# Patient Record
Sex: FEMALE | Race: WHITE | Employment: FULL TIME | URBAN - NONMETROPOLITAN AREA
[De-identification: names, ages, dates, MRNs, and addresses within clinical notes are randomized per-mention and may not be internally consistent; named-entity substitution may affect disease eponyms.]

---

## 2017-01-03 RX ORDER — SIMVASTATIN 40 MG
40 TABLET ORAL EVERY EVENING
Qty: 30 TABLET | Refills: 3 | Status: SHIPPED | OUTPATIENT
Start: 2017-01-03 | End: 2017-04-25 | Stop reason: SDUPTHER

## 2017-01-20 DIAGNOSIS — E11.9 TYPE 2 DIABETES MELLITUS WITHOUT COMPLICATION, UNSPECIFIED LONG TERM INSULIN USE STATUS: ICD-10-CM

## 2017-04-25 ENCOUNTER — OFFICE VISIT (OUTPATIENT)
Dept: FAMILY MEDICINE CLINIC | Age: 57
End: 2017-04-25

## 2017-04-25 VITALS
HEIGHT: 63 IN | HEART RATE: 91 BPM | BODY MASS INDEX: 47.06 KG/M2 | OXYGEN SATURATION: 98 % | WEIGHT: 265.6 LBS | SYSTOLIC BLOOD PRESSURE: 118 MMHG | DIASTOLIC BLOOD PRESSURE: 84 MMHG

## 2017-04-25 DIAGNOSIS — I10 ESSENTIAL HYPERTENSION: ICD-10-CM

## 2017-04-25 DIAGNOSIS — M54.41 ACUTE RIGHT-SIDED LOW BACK PAIN WITH RIGHT-SIDED SCIATICA: ICD-10-CM

## 2017-04-25 DIAGNOSIS — J30.89 ENVIRONMENTAL AND SEASONAL ALLERGIES: ICD-10-CM

## 2017-04-25 DIAGNOSIS — E11.9 TYPE 2 DIABETES MELLITUS WITHOUT COMPLICATION, WITHOUT LONG-TERM CURRENT USE OF INSULIN (HCC): Primary | ICD-10-CM

## 2017-04-25 DIAGNOSIS — E78.00 PURE HYPERCHOLESTEROLEMIA: ICD-10-CM

## 2017-04-25 LAB — HBA1C MFR BLD: 6.7 %

## 2017-04-25 PROCEDURE — 83036 HEMOGLOBIN GLYCOSYLATED A1C: CPT | Performed by: NURSE PRACTITIONER

## 2017-04-25 PROCEDURE — 99214 OFFICE O/P EST MOD 30 MIN: CPT | Performed by: NURSE PRACTITIONER

## 2017-04-25 RX ORDER — LOSARTAN POTASSIUM 25 MG/1
25 TABLET ORAL DAILY
Qty: 90 TABLET | Refills: 0 | Status: SHIPPED | OUTPATIENT
Start: 2017-04-25 | End: 2017-07-13 | Stop reason: SDUPTHER

## 2017-04-25 RX ORDER — MONTELUKAST SODIUM 10 MG/1
10 TABLET ORAL DAILY
Qty: 30 TABLET | Refills: 2 | Status: ON HOLD | OUTPATIENT
Start: 2017-04-25 | End: 2017-09-11 | Stop reason: HOSPADM

## 2017-04-25 RX ORDER — FLUTICASONE PROPIONATE 50 MCG
1 SPRAY, SUSPENSION (ML) NASAL 2 TIMES DAILY
Qty: 1 BOTTLE | Refills: 3 | Status: ON HOLD | OUTPATIENT
Start: 2017-04-25 | End: 2017-09-11 | Stop reason: HOSPADM

## 2017-04-25 RX ORDER — SIMVASTATIN 40 MG
40 TABLET ORAL EVERY EVENING
Qty: 90 TABLET | Refills: 3 | Status: SHIPPED | OUTPATIENT
Start: 2017-04-25 | End: 2018-05-23 | Stop reason: SDUPTHER

## 2017-04-25 RX ORDER — METHYLPREDNISOLONE 4 MG/1
TABLET ORAL
Qty: 1 KIT | Refills: 0 | Status: SHIPPED | OUTPATIENT
Start: 2017-04-25 | End: 2018-10-05 | Stop reason: SDUPTHER

## 2017-04-25 RX ORDER — CYCLOBENZAPRINE HCL 5 MG
5 TABLET ORAL EVERY 8 HOURS PRN
Qty: 30 TABLET | Refills: 3 | Status: SHIPPED | OUTPATIENT
Start: 2017-04-25 | End: 2017-05-05

## 2017-04-25 ASSESSMENT — ENCOUNTER SYMPTOMS
DIARRHEA: 0
NAUSEA: 0
WHEEZING: 0
CONSTIPATION: 0
BLURRED VISION: 0
VOMITING: 0
COUGH: 0

## 2017-04-25 ASSESSMENT — PATIENT HEALTH QUESTIONNAIRE - PHQ9
2. FEELING DOWN, DEPRESSED OR HOPELESS: 0
SUM OF ALL RESPONSES TO PHQ QUESTIONS 1-9: 0
SUM OF ALL RESPONSES TO PHQ9 QUESTIONS 1 & 2: 0
1. LITTLE INTEREST OR PLEASURE IN DOING THINGS: 0

## 2017-04-26 PROBLEM — J30.89 ENVIRONMENTAL AND SEASONAL ALLERGIES: Status: ACTIVE | Noted: 2017-04-26

## 2017-04-26 ASSESSMENT — ENCOUNTER SYMPTOMS: BACK PAIN: 1

## 2017-04-28 ENCOUNTER — NURSE ONLY (OUTPATIENT)
Dept: FAMILY MEDICINE CLINIC | Age: 57
End: 2017-04-28

## 2017-04-28 DIAGNOSIS — M54.41 ACUTE RIGHT-SIDED LOW BACK PAIN WITH RIGHT-SIDED SCIATICA: ICD-10-CM

## 2017-04-28 DIAGNOSIS — E11.9 TYPE 2 DIABETES MELLITUS WITHOUT COMPLICATION, WITHOUT LONG-TERM CURRENT USE OF INSULIN (HCC): ICD-10-CM

## 2017-04-28 DIAGNOSIS — E78.00 PURE HYPERCHOLESTEROLEMIA: ICD-10-CM

## 2017-04-28 LAB
A/G RATIO: 1.6 (ref 1.1–2.2)
ALBUMIN SERPL-MCNC: 4.2 G/DL (ref 3.4–5)
ALP BLD-CCNC: 93 U/L (ref 40–129)
ALT SERPL-CCNC: 14 U/L (ref 10–40)
ANION GAP SERPL CALCULATED.3IONS-SCNC: 15 MMOL/L (ref 3–16)
AST SERPL-CCNC: 20 U/L (ref 15–37)
BILIRUB SERPL-MCNC: 0.6 MG/DL (ref 0–1)
BUN BLDV-MCNC: 10 MG/DL (ref 7–20)
CALCIUM SERPL-MCNC: 9.9 MG/DL (ref 8.3–10.6)
CHLORIDE BLD-SCNC: 103 MMOL/L (ref 99–110)
CHOLESTEROL, TOTAL: 159 MG/DL (ref 0–199)
CO2: 27 MMOL/L (ref 21–32)
CREAT SERPL-MCNC: 0.8 MG/DL (ref 0.6–1.1)
GFR AFRICAN AMERICAN: >60
GFR NON-AFRICAN AMERICAN: >60
GLOBULIN: 2.6 G/DL
GLUCOSE BLD-MCNC: 119 MG/DL (ref 70–99)
HDLC SERPL-MCNC: 55 MG/DL (ref 40–60)
LDL CHOLESTEROL CALCULATED: 83 MG/DL
MAGNESIUM: 2.1 MG/DL (ref 1.8–2.4)
POTASSIUM SERPL-SCNC: 5.1 MMOL/L (ref 3.5–5.1)
SODIUM BLD-SCNC: 145 MMOL/L (ref 136–145)
TOTAL PROTEIN: 6.8 G/DL (ref 6.4–8.2)
TRIGL SERPL-MCNC: 105 MG/DL (ref 0–150)
VLDLC SERPL CALC-MCNC: 21 MG/DL

## 2017-04-28 PROCEDURE — 36415 COLL VENOUS BLD VENIPUNCTURE: CPT | Performed by: NURSE PRACTITIONER

## 2017-06-07 ENCOUNTER — OFFICE VISIT (OUTPATIENT)
Dept: FAMILY MEDICINE CLINIC | Age: 57
End: 2017-06-07

## 2017-06-07 VITALS
SYSTOLIC BLOOD PRESSURE: 134 MMHG | DIASTOLIC BLOOD PRESSURE: 78 MMHG | WEIGHT: 270 LBS | HEIGHT: 63 IN | BODY MASS INDEX: 47.84 KG/M2 | HEART RATE: 87 BPM | OXYGEN SATURATION: 98 %

## 2017-06-07 DIAGNOSIS — M54.41 ACUTE BILATERAL LOW BACK PAIN WITH RIGHT-SIDED SCIATICA: Primary | ICD-10-CM

## 2017-06-07 PROCEDURE — 99202 OFFICE O/P NEW SF 15 MIN: CPT | Performed by: NURSE PRACTITIONER

## 2017-06-07 RX ORDER — CYCLOBENZAPRINE HCL 5 MG
5 TABLET ORAL EVERY 8 HOURS PRN
Qty: 30 TABLET | Refills: 1 | Status: SHIPPED | OUTPATIENT
Start: 2017-06-07 | End: 2017-06-17

## 2017-06-07 RX ORDER — BENZONATATE 100 MG/1
CAPSULE ORAL
COMMUNITY
Start: 2017-05-16 | End: 2017-08-18 | Stop reason: ALTCHOICE

## 2017-06-07 RX ORDER — PREDNISONE 20 MG/1
40 TABLET ORAL DAILY
Qty: 10 TABLET | Refills: 0 | Status: SHIPPED | OUTPATIENT
Start: 2017-06-07 | End: 2017-06-12

## 2017-06-07 RX ORDER — LORATADINE 10 MG/1
10 TABLET ORAL DAILY
COMMUNITY
End: 2017-07-11 | Stop reason: SDUPTHER

## 2017-06-07 ASSESSMENT — ENCOUNTER SYMPTOMS
ALLERGIC/IMMUNOLOGIC NEGATIVE: 1
BACK PAIN: 1
GASTROINTESTINAL NEGATIVE: 1
EYES NEGATIVE: 1
RESPIRATORY NEGATIVE: 1

## 2017-06-08 ENCOUNTER — HOSPITAL ENCOUNTER (OUTPATIENT)
Dept: OTHER | Age: 57
Discharge: HOME | End: 2017-06-08
Attending: NURSE PRACTITIONER | Admitting: NURSE PRACTITIONER

## 2017-06-08 DIAGNOSIS — M54.41 ACUTE BILATERAL LOW BACK PAIN WITH RIGHT-SIDED SCIATICA: ICD-10-CM

## 2017-07-10 DIAGNOSIS — E11.9 TYPE 2 DIABETES MELLITUS WITHOUT COMPLICATION, WITHOUT LONG-TERM CURRENT USE OF INSULIN (HCC): ICD-10-CM

## 2017-07-11 DIAGNOSIS — E11.9 TYPE 2 DIABETES MELLITUS WITHOUT COMPLICATION, WITHOUT LONG-TERM CURRENT USE OF INSULIN (HCC): ICD-10-CM

## 2017-07-12 ENCOUNTER — TELEPHONE (OUTPATIENT)
Dept: FAMILY MEDICINE CLINIC | Age: 57
End: 2017-07-12

## 2017-07-12 RX ORDER — LORATADINE 10 MG/1
10 TABLET ORAL DAILY
Qty: 90 TABLET | Refills: 3 | Status: SHIPPED | OUTPATIENT
Start: 2017-07-12 | End: 2020-04-10 | Stop reason: SDUPTHER

## 2017-07-13 DIAGNOSIS — I10 ESSENTIAL HYPERTENSION: ICD-10-CM

## 2017-07-13 RX ORDER — LOSARTAN POTASSIUM 25 MG/1
25 TABLET ORAL DAILY
Qty: 90 TABLET | Refills: 0 | Status: SHIPPED | OUTPATIENT
Start: 2017-07-13 | End: 2017-11-11 | Stop reason: SDUPTHER

## 2017-08-18 ENCOUNTER — OFFICE VISIT (OUTPATIENT)
Dept: FAMILY MEDICINE CLINIC | Age: 57
End: 2017-08-18

## 2017-08-18 VITALS
RESPIRATION RATE: 16 BRPM | HEART RATE: 78 BPM | DIASTOLIC BLOOD PRESSURE: 88 MMHG | SYSTOLIC BLOOD PRESSURE: 124 MMHG | WEIGHT: 269 LBS | TEMPERATURE: 98.1 F | BODY MASS INDEX: 47.66 KG/M2 | OXYGEN SATURATION: 96 % | HEIGHT: 63 IN

## 2017-08-18 DIAGNOSIS — I10 ESSENTIAL HYPERTENSION: ICD-10-CM

## 2017-08-18 DIAGNOSIS — E11.9 TYPE 2 DIABETES MELLITUS WITHOUT COMPLICATION, WITHOUT LONG-TERM CURRENT USE OF INSULIN (HCC): Primary | ICD-10-CM

## 2017-08-18 DIAGNOSIS — R53.83 FATIGUE, UNSPECIFIED TYPE: ICD-10-CM

## 2017-08-18 DIAGNOSIS — M54.50 CHRONIC BILATERAL LOW BACK PAIN WITHOUT SCIATICA: ICD-10-CM

## 2017-08-18 DIAGNOSIS — E66.01 MORBID OBESITY DUE TO EXCESS CALORIES (HCC): ICD-10-CM

## 2017-08-18 DIAGNOSIS — G89.29 CHRONIC BILATERAL LOW BACK PAIN WITHOUT SCIATICA: ICD-10-CM

## 2017-08-18 LAB
A/G RATIO: 1.6 (ref 1.1–2.2)
ALBUMIN SERPL-MCNC: 4.3 G/DL (ref 3.4–5)
ALP BLD-CCNC: 102 U/L (ref 40–129)
ALT SERPL-CCNC: 25 U/L (ref 10–40)
ANION GAP SERPL CALCULATED.3IONS-SCNC: 17 MMOL/L (ref 3–16)
AST SERPL-CCNC: 36 U/L (ref 15–37)
BILIRUB SERPL-MCNC: 0.9 MG/DL (ref 0–1)
BUN BLDV-MCNC: 13 MG/DL (ref 7–20)
CALCIUM SERPL-MCNC: 10.2 MG/DL (ref 8.3–10.6)
CHLORIDE BLD-SCNC: 102 MMOL/L (ref 99–110)
CHOLESTEROL, TOTAL: 183 MG/DL (ref 0–199)
CO2: 24 MMOL/L (ref 21–32)
CREAT SERPL-MCNC: 0.7 MG/DL (ref 0.6–1.1)
GFR AFRICAN AMERICAN: >60
GFR NON-AFRICAN AMERICAN: >60
GLOBULIN: 2.7 G/DL
GLUCOSE BLD-MCNC: 143 MG/DL (ref 70–99)
HDLC SERPL-MCNC: 57 MG/DL (ref 40–60)
LDL CHOLESTEROL CALCULATED: 98 MG/DL
POTASSIUM SERPL-SCNC: 4.8 MMOL/L (ref 3.5–5.1)
SODIUM BLD-SCNC: 143 MMOL/L (ref 136–145)
T4 FREE: 1.3 NG/DL (ref 0.9–1.8)
TOTAL PROTEIN: 7 G/DL (ref 6.4–8.2)
TRIGL SERPL-MCNC: 138 MG/DL (ref 0–150)
TSH SERPL DL<=0.05 MIU/L-ACNC: 2.96 UIU/ML (ref 0.27–4.2)
VITAMIN D 25-HYDROXY: 28.9 NG/ML
VLDLC SERPL CALC-MCNC: 28 MG/DL

## 2017-08-18 PROCEDURE — 36415 COLL VENOUS BLD VENIPUNCTURE: CPT | Performed by: NURSE PRACTITIONER

## 2017-08-18 PROCEDURE — 99214 OFFICE O/P EST MOD 30 MIN: CPT | Performed by: NURSE PRACTITIONER

## 2017-08-18 RX ORDER — MELOXICAM 15 MG/1
15 TABLET ORAL DAILY
Qty: 30 TABLET | Refills: 3 | Status: ON HOLD | OUTPATIENT
Start: 2017-08-18 | End: 2017-09-11 | Stop reason: HOSPADM

## 2017-08-18 ASSESSMENT — ENCOUNTER SYMPTOMS
COUGH: 0
BOWEL INCONTINENCE: 0
CHANGE IN BOWEL HABIT: 0
NAUSEA: 0
EYES NEGATIVE: 1
ORTHOPNEA: 0
VOMITING: 0
SHORTNESS OF BREATH: 0
GASTROINTESTINAL NEGATIVE: 1
SWOLLEN GLANDS: 0
SORE THROAT: 0
VISUAL CHANGE: 0
BLURRED VISION: 0
WHEEZING: 0
ABDOMINAL PAIN: 0
BACK PAIN: 1

## 2017-08-19 DIAGNOSIS — E11.65 TYPE 2 DIABETES MELLITUS WITH HYPERGLYCEMIA, WITHOUT LONG-TERM CURRENT USE OF INSULIN (HCC): Primary | ICD-10-CM

## 2017-08-19 LAB
ESTIMATED AVERAGE GLUCOSE: 180 MG/DL
HBA1C MFR BLD: 7.9 %

## 2017-08-19 RX ORDER — PIOGLITAZONEHYDROCHLORIDE 30 MG/1
30 TABLET ORAL DAILY
Qty: 30 TABLET | Refills: 3 | Status: SHIPPED | OUTPATIENT
Start: 2017-08-19 | End: 2017-11-17 | Stop reason: SINTOL

## 2017-08-21 DIAGNOSIS — R79.89 LOW SERUM VITAMIN D: Primary | ICD-10-CM

## 2017-08-21 DIAGNOSIS — R73.09 ELEVATED HEMOGLOBIN A1C: ICD-10-CM

## 2017-08-25 ENCOUNTER — HOSPITAL ENCOUNTER (OUTPATIENT)
Dept: PHYSICAL THERAPY | Age: 57
Discharge: HOME | End: 2017-08-31

## 2017-08-29 ENCOUNTER — HOSPITAL ENCOUNTER (OUTPATIENT)
Dept: PHYSICAL THERAPY | Age: 57
Discharge: HOME | End: 2017-08-29

## 2017-09-01 ENCOUNTER — HOSPITAL ENCOUNTER (OUTPATIENT)
Dept: PHYSICAL THERAPY | Age: 57
Discharge: HOME | End: 2017-09-01

## 2017-09-06 ENCOUNTER — HOSPITAL ENCOUNTER (OUTPATIENT)
Dept: PHYSICAL THERAPY | Age: 57
Discharge: HOME | End: 2017-09-06

## 2017-09-10 PROBLEM — R55 SYNCOPE: Status: ACTIVE | Noted: 2017-09-10

## 2017-09-13 ENCOUNTER — HOSPITAL ENCOUNTER (OUTPATIENT)
Dept: DIABETES SERVICES | Age: 57
Discharge: HOME | End: 2017-09-30
Attending: NURSE PRACTITIONER | Admitting: NURSE PRACTITIONER

## 2017-09-13 DIAGNOSIS — E11.9 TYPE 2 DIABETES MELLITUS WITHOUT COMPLICATIONS (HCC): ICD-10-CM

## 2017-09-13 ASSESSMENT — PATIENT HEALTH QUESTIONNAIRE - PHQ9
SUM OF ALL RESPONSES TO PHQ QUESTIONS 1-9: 0
2. FEELING DOWN, DEPRESSED OR HOPELESS: 0
SUM OF ALL RESPONSES TO PHQ9 QUESTIONS 1 & 2: 0
1. LITTLE INTEREST OR PLEASURE IN DOING THINGS: 0

## 2017-09-13 NOTE — PROGRESS NOTES
Individual Comprehensive DSMT Assessment:  Health Literacy:   [x] adequate   [] limited  Pre-Test Score: 7/8  Sleep Screen: unremarkable  PHQ9: 0      Initial instruction included:  [x] carb counting  [x] activity/exercise  [x] label reading  [x] monitoring   [] medications  [] hypoglycemia prevention/treatment  [] insulin management  [] other:    Education and Support Plan: Patient plans to attend comp. Diabetes education.     Referring Provider: London Coronel CNP

## 2017-09-14 ENCOUNTER — HOSPITAL ENCOUNTER (OUTPATIENT)
Dept: DIABETES SERVICES | Age: 57
Discharge: HOME | End: 2017-09-14

## 2017-09-14 DIAGNOSIS — E11.9 TYPE 2 DIABETES MELLITUS WITHOUT COMPLICATIONS (HCC): ICD-10-CM

## 2017-10-04 ENCOUNTER — HOSPITAL ENCOUNTER (OUTPATIENT)
Dept: DIABETES SERVICES | Age: 57
Discharge: HOME | End: 2017-10-04
Admitting: NURSE PRACTITIONER

## 2017-10-05 NOTE — PROGRESS NOTES
Nutrition 1    Reviewed:  Appropriate timing of meals and snacks                     Food sources of carbohydrate                    Using Nutrition facts Labels                    Serving sizes                    Goal setting    Reviewed:  Carbohydrate counting                      MyPlate           Meal Planning           Guidelines for alcohol use    Total class participants:  7    Referring provider:  Brent Calvillo CNP

## 2017-10-12 ENCOUNTER — HOSPITAL ENCOUNTER (OUTPATIENT)
Dept: DIABETES SERVICES | Age: 57
Discharge: HOME | End: 2017-10-12
Admitting: NURSE PRACTITIONER

## 2017-10-12 DIAGNOSIS — E11.9 TYPE 2 DIABETES MELLITUS WITHOUT COMPLICATION, WITHOUT LONG-TERM CURRENT USE OF INSULIN (HCC): Primary | ICD-10-CM

## 2017-10-12 NOTE — PROGRESS NOTES
Reviewed & patient demonstrated understanding of:    Long term complications related to blood glucose levels  Strategies to reduce risks for complications  Strategies to make appropriate life style changes  Appropriate screenings and health care F/U  Sick day guidelines/contacting health care providers  Feelings/stress/depression about living with diabetes  Appropriate coping mechanisms in managing stress  Family/SO role in managing diabetes    Total time with patient:120 minutes    Total participants in group:2    PROVIDER:Maggy Gamez CNP

## 2017-11-01 ENCOUNTER — HOSPITAL ENCOUNTER (OUTPATIENT)
Dept: OTHER | Age: 57
Discharge: HOME | End: 2017-11-30
Attending: NURSE PRACTITIONER | Admitting: NURSE PRACTITIONER

## 2017-11-08 ENCOUNTER — HOSPITAL ENCOUNTER (OUTPATIENT)
Dept: DIABETES SERVICES | Age: 57
Discharge: HOME | End: 2017-11-08
Admitting: NURSE PRACTITIONER

## 2017-11-08 NOTE — PROGRESS NOTES
Patient attended RD2 Group Class  Reviewed and patient demonstrated understanding of the following:  Nutrition guidelines to maintain cardiovascular health  Strategies to deal with social events, restaurant meals, and emotion-based eating  Goal Setting    REFERRING PROVIDER: Patricia Gan CNP    Total participants in Group:5

## 2017-11-11 DIAGNOSIS — I10 ESSENTIAL HYPERTENSION: ICD-10-CM

## 2017-11-13 RX ORDER — LOSARTAN POTASSIUM 25 MG/1
TABLET ORAL
Qty: 30 TABLET | Refills: 2 | Status: SHIPPED | OUTPATIENT
Start: 2017-11-13 | End: 2018-02-19 | Stop reason: SDUPTHER

## 2017-11-17 ENCOUNTER — OFFICE VISIT (OUTPATIENT)
Dept: FAMILY MEDICINE CLINIC | Age: 57
End: 2017-11-17

## 2017-11-17 VITALS
DIASTOLIC BLOOD PRESSURE: 76 MMHG | SYSTOLIC BLOOD PRESSURE: 122 MMHG | HEART RATE: 81 BPM | OXYGEN SATURATION: 96 % | WEIGHT: 269 LBS | BODY MASS INDEX: 47.66 KG/M2 | HEIGHT: 63 IN

## 2017-11-17 DIAGNOSIS — Z12.31 ENCOUNTER FOR SCREENING MAMMOGRAM FOR BREAST CANCER: ICD-10-CM

## 2017-11-17 DIAGNOSIS — Z12.11 COLON CANCER SCREENING: ICD-10-CM

## 2017-11-17 DIAGNOSIS — E11.9 TYPE 2 DIABETES MELLITUS WITHOUT COMPLICATION, WITHOUT LONG-TERM CURRENT USE OF INSULIN (HCC): Primary | ICD-10-CM

## 2017-11-17 PROBLEM — E55.9 VITAMIN D DEFICIENCY: Status: ACTIVE | Noted: 2017-11-17

## 2017-11-17 LAB — HBA1C MFR BLD: 6.6 %

## 2017-11-17 PROCEDURE — 99214 OFFICE O/P EST MOD 30 MIN: CPT | Performed by: NURSE PRACTITIONER

## 2017-11-17 PROCEDURE — 83036 HEMOGLOBIN GLYCOSYLATED A1C: CPT | Performed by: NURSE PRACTITIONER

## 2017-11-17 ASSESSMENT — ENCOUNTER SYMPTOMS
VISUAL CHANGE: 0
BLURRED VISION: 0

## 2017-11-17 NOTE — PROGRESS NOTES
[facility] PHYSICIAN PRACTICES  [facility] [place] [address]  Dept: [phone]  Dept Fax: [phone]    Arsenio Broderick is a 61 y.o. female who presents today for her medical conditions/complaints as noted below. Arsenio Broderick is c/o of Diabetes (Checks BS 1 x day. She is doing diabetic classes. States her numbers have been good. Tries to watch diet. ); Hypertension (Medication compliant. Does not check BP at home. Watching salt intake. Denies chest pain or SOB or Headaches. ); Hyperlipidemia (Tries to watch diet. ); and Other (She is fasting today for labwork. She declines Pneumonia, Influenza and TDAP vaccines. )        HPI:     Chief Complaint   Patient presents with    Diabetes     Checks BS 1 x day. She is doing diabetic classes. States her numbers have been good. Tries to watch diet.  Hypertension     Medication compliant. Does not check BP at home. Watching salt intake. Denies chest pain or SOB or Headaches.  Hyperlipidemia     Tries to watch diet.  Other     She is fasting today for labwork. She declines Pneumonia, Influenza and TDAP vaccines. Diabetes   She presents for her initial diabetic visit. She has type 2 diabetes mellitus. Her disease course has been stable. Hypoglycemia symptoms include hunger, nervousness/anxiousness, sweats and tremors (When hypoglycemic). Pertinent negatives for hypoglycemia include no confusion, dizziness, headaches, mood changes, pallor, seizures, sleepiness or speech difficulty. (Checked glucose and it was 85 - which she states was \"low\" for her ) Associated symptoms include fatigue (Chronic), polyphagia and polyuria. Pertinent negatives for diabetes include no blurred vision, no chest pain, no foot paresthesias, no foot ulcerations, no polydipsia, no visual change, no weakness and no weight loss. There are no hypoglycemic complications. Symptoms are stable. There are no diabetic complications.  Pertinent Hyperlipidemia   This is a chronic problem. The current episode started more than 1 year ago. The problem is controlled. Recent lipid tests were reviewed and are normal. Exacerbating diseases include diabetes and obesity. She has no history of chronic renal disease, hypothyroidism, liver disease or nephrotic syndrome. Pertinent negatives include no chest pain, focal sensory loss, focal weakness, leg pain, myalgias or shortness of breath. Current antihyperlipidemic treatment includes statins. The current treatment provides significant improvement of lipids. Compliance problems include adherence to exercise. Risk factors for coronary artery disease include diabetes mellitus, dyslipidemia, family history, obesity, hypertension, post-menopausal and a sedentary lifestyle. Ms. Luis Deras states she is doing well with her diabetes. She has been going to the the diabetes education classes and watching her diet. She states she is determined to get off medications for diabetes. She states she has not lost any weight, but she has been exercising and watching everything she is eating. She denies any chest pain, shortness of breath, foot ulcers or cuts. She admits to her glucose feeling low around 85. She states she understands that this glucose is not \"low;\" however, it does feel low to her.       Past Medical History:   Diagnosis Date    Diabetes mellitus ([address]     Fractures     Hyperlipidemia     Hypertension     Obesity     Seasonal allergies     Type 2 diabetes mellitus without complication (HCC)       Past Surgical History:   Procedure Laterality Date    TONSILLECTOMY      TUBAL LIGATION  1983       Family History   Problem Relation Age of Onset    High Blood Pressure Mother     Cancer Mother        Social History   Substance Use Topics    Smoking status: Never Smoker    Smokeless tobacco: Never Used    Alcohol use No      Current Outpatient Prescriptions   Medication Sig Dispense Refill    09/11/2017 32.8  31.0 - 36.0 g/dL Final    RDW 09/11/2017 14.3  12.4 - 15.4 % Final    Platelets 36/03/9386 384  135 - 450 K/uL Final    MPV 09/11/2017 7.7  5.0 - 10.5 fL Final    Magnesium 09/11/2017 2.10  1.80 - 2.40 mg/dL Final    Troponin 09/11/2017 <0.01  <0.01 ng/mL Final    Ventricular Rate 09/13/2017 85  BPM Final    Atrial Rate 09/13/2017 85  BPM Final    P-R Interval 09/13/2017 146  ms Final    QRS Duration 09/13/2017 88  ms Final    Q-T Interval 09/13/2017 400  ms Final    QTc Calculation (Bazett) 09/13/2017 476  ms Final    P Axis 09/13/2017 32  degrees Final    R Axis 09/13/2017 -25  degrees Final    T Axis 09/13/2017 23  degrees Final    Diagnosis 09/13/2017    Final                    Value:Normal sinus rhythm  Poor R wave progression  Nonspecific T wave abnormality  Abnormal ECG  When compared with ECG of 10-SEP-2017 12:39,  Sinus rhythm has replaced Ectopic atrial rhythm  Confirmed by Suzanne Horton MD, Audra Barton ([id number]) on 9/11/2017 4:58:29 PM      POC Glucose 09/10/2017 154* 70 - 99 mg/dl Final    Performed on 09/10/2017 ACCU-CHEK   Final    POC Glucose 09/11/2017 119* 70 - 99 mg/dl Final    Performed on 09/11/2017 ACCU-CHEK   Final    POC Glucose 09/11/2017 138* 70 - 99 mg/dl Final    Performed on 09/11/2017 ACCU-CHEK   Final    POC Glucose 09/11/2017 135* 70 - 99 mg/dl Final    Performed on 09/11/2017 ACCU-CHEK   Final           Assessment & Plan: The following diagnoses and conditions are stable with no further orders unless indicated:  1. Encounter for screening mammogram for breast cancer    2. Type 2 diabetes mellitus without complication, without long-term current use of insulin ([address]    3. Colon cancer screening    4. Screening for HIV (human immunodeficiency virus)    5. Encounter for hepatitis C screening test for low risk patient    6. Encounter for vitamin deficiency screening    7.  Vitamin D deficiency        Pete Mahoney was seen today for diabetes, hypertension, hyperlipidemia and other. Ms. Chrissie Godinez A1C has improved significantly. Will take her off of Actos per patient request since she states, \"I think it is giving me diarrhea. \"  She may continue to take glucose once daily. She should continue with diabetic education. Diagnoses and all orders for this visit:    Encounter for screening mammogram for breast cancer  -     [facility] Digital Screen Bilateral [[id number]]; Future    Type 2 diabetes mellitus without complication, without long-term current use of insulin (HCC)  -     sitaGLIPtan-metformin (JANUMET)  MG per tablet; Take 1 tablet by mouth 2 times daily (with meals)  -      DIABETES FOOT EXAM  -     Microalbumin / creatinine urine ratio  -     CBC Auto Differential  -     Comprehensive Metabolic Panel  -     POCT glycosylated hemoglobin (Hb A1C)    Colon cancer screening  -     Sanam Christy MD ([facility])    I have recommended that this patient have a mammogram, pap smear, flu shot, tetanus booster, pneumonia vaccine, and colonoscopy but she declines at this time. I have discussed the risks and benefits of this procedure with her. The patient verbalizes understanding. This provider gave patient referrals in case she would like to pursue screenings. Return in about 3 months (around 2/17/2018) for DM . Patient should call the office immediately with new or ongoing signs or symptoms or worsening, or proceed to the emergency room. If you are on medications which could impair your senses, you are at risk of weakness, falls, dizziness, or drowsiness. You should be careful during activities which could place you at risk of harm, such as climbing, using stairs, operating machinery, or driving vehicles. If you feel you cannot safely do these activities, you should request others to help you, or avoid the activities altogether. If you are drowsy for any other reason, you should use the same precautions as listed above.     Call if pattern of symptoms change or persists for an extended time.

## 2017-11-17 NOTE — LETTER
2017     [facility] [address]      Dear Vara Phoenix: Thank you for enrolling in [address] Please follow the instructions below to securely access your online medical record. [employer] allows you to send messages to your doctor, view your test results, renew your prescriptions, schedule appointments, and more. How Do I Sign Up? 1. In your Internet browser, go to [email or web address]  2. Click on the Sign Up Now link in the Sign In box. You will see the New Member Sign Up page. 3. Enter your [employer] Access Code exactly as it appears below. You will not need to use this code after youve completed the sign-up process. If you do not sign up before the expiration date, you must request a new code. [employer] Access Code: Activation Code has   Activation Code Expiration: 11/10/2017  4:39 PM  Enter your Social Security Number ([id number]) and Date of Birth (mm/dd/yyyy) as indicated and click Submit. You will be taken to the next sign-up page. 4. Create a [employer]i ID. This will be your [employer]i login ID and cannot be changed, so think of one that is secure and easy to remember. 5. Create a [employer]i password. You can change your password at any time. 6. Enter your Password Reset Question and Answer. This can be used at a later time if you forget your password. 7. Enter your e-mail address. You will receive e-mail notification when new information is available in [address] 8. Click Sign Up. You can now view your medical record. Additional Information  If you have questions, please contact the physician practice where you receive care. Remember, [employer] is NOT to be used for urgent needs. For medical emergencies, dial 911. For questions regarding your [employer] account call [phone]. If you have a clinical question, please call your doctor's office.

## 2017-11-19 ASSESSMENT — ENCOUNTER SYMPTOMS
ORTHOPNEA: 0
GASTROINTESTINAL NEGATIVE: 1
SHORTNESS OF BREATH: 0
EYES NEGATIVE: 1
RESPIRATORY NEGATIVE: 1

## 2017-11-22 ENCOUNTER — HOSPITAL ENCOUNTER (OUTPATIENT)
Dept: DIABETES SERVICES | Age: 57
Discharge: HOME | End: 2017-11-22
Admitting: NURSE PRACTITIONER

## 2017-11-22 NOTE — LETTER
Diabetes Education  [address]     TO: Alejandrasatinder Oconnell    RE: Brit TYSONO.B.: 1960    A Follow-Up Assessment was completed on 2017. Education Update : The following content has been reviewed since the initial assessment:    Managing Diabetes (Carb counting, monitoring, medications, physical activity)         Balancing Diabetes (Meal planning, using BG results, identifying BG targets)    Preventive Care        Cholesterol Education        Hypertension Education     Problem Solving    Insulin Management      Meter Start    An ongoing plan was created to include:                 follow up:    Mak Chapman (see below)                declined further education    Post Program Self Management Support plans include:    Diabetes Care Appointments with MD                         Annual Review                               Internet Resources    Community Programs/Support Groups                        Monthly Diabetes Newsletters/Magazines      Thank you for the opportunity to provide Diabetes Self Management Education to your patient.     Jb Burroughs, MS, RD, LD, CDE  Dietitian, Diabetes Educator

## 2017-12-01 ENCOUNTER — HOSPITAL ENCOUNTER (OUTPATIENT)
Dept: OTHER | Age: 57
Discharge: HOME | End: 2017-12-31
Attending: NURSE PRACTITIONER | Admitting: NURSE PRACTITIONER

## 2017-12-05 ENCOUNTER — TELEPHONE (OUTPATIENT)
Dept: FAMILY MEDICINE CLINIC | Age: 57
End: 2017-12-05

## 2017-12-05 NOTE — TELEPHONE ENCOUNTER
Please have patient inform us when she does have it performed so we can have the results.   Thank you

## 2018-02-19 DIAGNOSIS — I10 ESSENTIAL HYPERTENSION: ICD-10-CM

## 2018-02-19 RX ORDER — LOSARTAN POTASSIUM 25 MG/1
TABLET ORAL
Qty: 30 TABLET | Refills: 1 | Status: SHIPPED | OUTPATIENT
Start: 2018-02-19 | End: 2018-04-23 | Stop reason: SDUPTHER

## 2018-04-23 DIAGNOSIS — I10 ESSENTIAL HYPERTENSION: ICD-10-CM

## 2018-04-23 RX ORDER — LOSARTAN POTASSIUM 25 MG/1
TABLET ORAL
Qty: 30 TABLET | Refills: 0 | Status: SHIPPED | OUTPATIENT
Start: 2018-04-23 | End: 2018-05-22 | Stop reason: SDUPTHER

## 2018-05-22 DIAGNOSIS — I10 ESSENTIAL HYPERTENSION: ICD-10-CM

## 2018-05-23 DIAGNOSIS — E78.00 PURE HYPERCHOLESTEROLEMIA: ICD-10-CM

## 2018-05-23 RX ORDER — LOSARTAN POTASSIUM 25 MG/1
TABLET ORAL
Qty: 30 TABLET | Refills: 0 | Status: SHIPPED | OUTPATIENT
Start: 2018-05-23 | End: 2018-07-05 | Stop reason: SDUPTHER

## 2018-05-23 RX ORDER — SIMVASTATIN 40 MG
40 TABLET ORAL EVERY EVENING
Qty: 90 TABLET | Refills: 3 | OUTPATIENT
Start: 2018-05-23

## 2018-05-23 RX ORDER — SIMVASTATIN 40 MG
40 TABLET ORAL EVERY EVENING
Qty: 90 TABLET | Refills: 3 | Status: SHIPPED | OUTPATIENT
Start: 2018-05-23 | End: 2018-08-03 | Stop reason: SDUPTHER

## 2018-06-14 DIAGNOSIS — E11.9 TYPE 2 DIABETES MELLITUS WITHOUT COMPLICATION, WITHOUT LONG-TERM CURRENT USE OF INSULIN (HCC): ICD-10-CM

## 2018-06-14 RX ORDER — SITAGLIPTIN AND METFORMIN HYDROCHLORIDE 1000; 50 MG/1; MG/1
TABLET, FILM COATED ORAL
Qty: 180 TABLET | Refills: 0 | Status: SHIPPED | OUTPATIENT
Start: 2018-06-14 | End: 2018-08-03 | Stop reason: SDUPTHER

## 2018-08-03 ENCOUNTER — OFFICE VISIT (OUTPATIENT)
Dept: FAMILY MEDICINE CLINIC | Age: 58
End: 2018-08-03

## 2018-08-03 VITALS
SYSTOLIC BLOOD PRESSURE: 126 MMHG | DIASTOLIC BLOOD PRESSURE: 80 MMHG | HEIGHT: 63 IN | BODY MASS INDEX: 48.73 KG/M2 | HEART RATE: 76 BPM | WEIGHT: 275 LBS | OXYGEN SATURATION: 96 %

## 2018-08-03 DIAGNOSIS — I10 ESSENTIAL HYPERTENSION: ICD-10-CM

## 2018-08-03 DIAGNOSIS — E11.9 TYPE 2 DIABETES MELLITUS WITHOUT COMPLICATION, WITHOUT LONG-TERM CURRENT USE OF INSULIN (HCC): Primary | ICD-10-CM

## 2018-08-03 DIAGNOSIS — Z11.59 ENCOUNTER FOR HEPATITIS C SCREENING TEST FOR LOW RISK PATIENT: ICD-10-CM

## 2018-08-03 DIAGNOSIS — E78.00 PURE HYPERCHOLESTEROLEMIA: ICD-10-CM

## 2018-08-03 DIAGNOSIS — E55.9 VITAMIN D DEFICIENCY: ICD-10-CM

## 2018-08-03 DIAGNOSIS — Z13.29 SCREENING FOR THYROID DISORDER: ICD-10-CM

## 2018-08-03 LAB
BILIRUBIN, POC: NEGATIVE
BLOOD URINE, POC: NEGATIVE
CLARITY, POC: NORMAL
COLOR, POC: NORMAL
CREATININE URINE: 45 MG/DL (ref 28–259)
GLUCOSE URINE, POC: NEGATIVE
HBA1C MFR BLD: 7 %
KETONES, POC: NEGATIVE
LEUKOCYTE EST, POC: NEGATIVE
MICROALBUMIN UR-MCNC: <1.2 MG/DL
MICROALBUMIN/CREAT UR-RTO: NORMAL MG/G (ref 0–30)
NITRITE, POC: NORMAL
PH, POC: 6.5
PROTEIN, POC: NEGATIVE
SPECIFIC GRAVITY, POC: 1.01
UROBILINOGEN, POC: 0.02

## 2018-08-03 PROCEDURE — 81002 URINALYSIS NONAUTO W/O SCOPE: CPT | Performed by: NURSE PRACTITIONER

## 2018-08-03 PROCEDURE — 99214 OFFICE O/P EST MOD 30 MIN: CPT | Performed by: NURSE PRACTITIONER

## 2018-08-03 PROCEDURE — 83036 HEMOGLOBIN GLYCOSYLATED A1C: CPT | Performed by: NURSE PRACTITIONER

## 2018-08-03 RX ORDER — LOSARTAN POTASSIUM 25 MG/1
TABLET ORAL
Qty: 90 TABLET | Refills: 1 | Status: SHIPPED | OUTPATIENT
Start: 2018-08-03 | End: 2019-02-01 | Stop reason: SDUPTHER

## 2018-08-03 RX ORDER — LOSARTAN POTASSIUM 25 MG/1
TABLET ORAL
Qty: 90 TABLET | Refills: 1 | Status: SHIPPED | OUTPATIENT
Start: 2018-08-03 | End: 2018-08-03 | Stop reason: SDUPTHER

## 2018-08-03 RX ORDER — SIMVASTATIN 40 MG
40 TABLET ORAL EVERY EVENING
Qty: 90 TABLET | Refills: 1 | Status: SHIPPED | OUTPATIENT
Start: 2018-08-03 | End: 2019-02-01 | Stop reason: SDUPTHER

## 2018-08-03 RX ORDER — SIMVASTATIN 40 MG
40 TABLET ORAL EVERY EVENING
Qty: 90 TABLET | Refills: 1 | Status: SHIPPED | OUTPATIENT
Start: 2018-08-03 | End: 2018-08-03 | Stop reason: SDUPTHER

## 2018-08-03 ASSESSMENT — ENCOUNTER SYMPTOMS
VISUAL CHANGE: 0
BLURRED VISION: 0

## 2018-08-03 NOTE — PROGRESS NOTES
[facility] PHYSICIAN PRACTICES  [place] [address]  Dept: [phone]  Dept Fax: [phone]    Kory Vargas is a 61 y.o. female who presents today for her medical conditions/complaints as noted below. Kory Vargas is c/o of Diabetes (pt is checking her sugats 3-4 times a week , pt is taking medication as perscribed ); Hyperlipidemia (pt is not fasting for blood work); Hypertension (pt does not check bp at home , pt is taking medication as perscribed , pt had no chest pain no sob or swelling ); and Other (vilt d )        HPI:     Chief Complaint   Patient presents with    Diabetes     pt is checking her sugats 3-4 times a week , pt is taking medication as perscribed     Hyperlipidemia     pt is not fasting for blood work    Hypertension     pt does not check bp at home , pt is taking medication as perscribed , pt had no chest pain no sob or swelling     Other     vilt d        Diabetes   She presents for her follow-up diabetic visit. She has type 2 diabetes mellitus. Her disease course has been stable. There are no hypoglycemic associated symptoms. Pertinent negatives for diabetes include no blurred vision, no chest pain, no fatigue, no foot paresthesias, no foot ulcerations, no polydipsia, no polyphagia, no polyuria, no visual change, no weakness and no weight loss. There are no hypoglycemic complications. Symptoms are stable. There are no diabetic complications. Risk factors for coronary artery disease include diabetes mellitus, dyslipidemia, family history, hypertension, sedentary lifestyle and obesity. Current diabetic treatment includes diet and oral agent (dual therapy). She is compliant with treatment all of the time. Her weight is stable. She is following a generally healthy diet. When asked about meal planning, she reported none. She has not had a previous visit with a dietitian. She participates in exercise intermittently.  There is no change in mouth daily 30 tablet 3    loratadine (CLARITIN) 10 MG tablet Take 1 tablet by mouth daily 90 tablet 3    Glucose Blood (BLOOD GLUCOSE TEST STRIPS) STRP Test daily. 100 strip 0    aspirin 81 MG tablet Take 81 mg by mouth daily       No current facility-administered medications for this visit. Allergies   Allergen Reactions    Codeine     Lipitor [Atorvastatin]     Penicillins        Subjective:      Review of Systems   Constitutional: Negative for fatigue and weight loss. Eyes: Negative for blurred vision. Cardiovascular: Negative for chest pain. Endocrine: Negative for polydipsia, polyphagia and polyuria. Neurological: Negative for weakness. Objective:     Vitals:    08/03/18 1026   BP: 126/80   Site: Left Arm   Position: Sitting   Cuff Size: Large Adult   Pulse: 76   SpO2: 96%   Weight: 275 lb (124.7 kg)   Height: 5' 3\" (1.6 m)     Wt Readings from Last 3 Encounters:   08/03/18 275 lb (124.7 kg)   11/17/17 269 lb (122 kg)   09/10/17 250 lb (113.4 kg)     Temp Readings from Last 3 Encounters:   09/11/17 98.5 °F (36.9 °C) (Oral)   09/10/17 98.2 °F (36.8 °C) (Oral)   08/18/17 98.1 °F (36.7 °C) (Temporal)     BP Readings from Last 3 Encounters:   08/03/18 126/80   11/17/17 122/76   09/11/17 133/77     Pulse Readings from Last 3 Encounters:   08/03/18 76   11/17/17 81   09/11/17 78     Physical Exam   Constitutional: She is oriented to person, place, and time. She appears well-developed and well-nourished. No distress. Obese    HENT:   Head: Normocephalic and atraumatic. Right Ear: External ear normal.   Left Ear: External ear normal.   Nose: Nose normal.   Mouth/Throat: Oropharynx is clear and moist. No oropharyngeal exudate. Eyes: Conjunctivae and EOM are normal. Pupils are equal, round, and reactive to light. Right eye exhibits no discharge. Left eye exhibits no discharge. No scleral icterus. Neck: Normal range of motion. Neck supple. No tracheal deviation present.  No thyromegaly time. Will continue on the simvastatin and repeat cholesterol check at her follow-up visit when she is fasting. Vitamin D stable at this time. We'll recheck vitamin D at her follow-up appointment. Diagnoses and all orders for this visit:    Type 2 diabetes mellitus without complication, without long-term current use of insulin (HCC)  -     Microalbumin / Creatinine Urine Ratio  -     POCT Urinalysis no Micro  -     POCT glycosylated hemoglobin (Hb A1C)  -     Comprehensive Metabolic Panel; Future  -     CBC; Future  -     Hemoglobin A1C; Future  -     sitaGLIPtan-metformin (JANUMET)  MG per tablet; Take 1 tablet by mouth 2 times daily (with meals)    Essential hypertension  -     Comprehensive Metabolic Panel; Future  -     CBC; Future  -     losartan (COZAAR) 25 MG tablet; TAKE ONE TABLET BY MOUTH DAILY    Pure hypercholesterolemia  -     Lipid Panel; Future  -     Comprehensive Metabolic Panel; Future  -     CBC; Future  -     simvastatin (ZOCOR) 40 MG tablet; Take 1 tablet by mouth every evening    Vitamin D deficiency  -     Vitamin D 25 Hydroxy; Future    Encounter for hepatitis C screening test for low risk patient  -     HEPATITIS C ANTIBODY; Future      Prior to Visit Medications    Medication Sig Taking?  Authorizing Provider   losartan (COZAAR) 25 MG tablet TAKE ONE TABLET BY MOUTH DAILY Yes JAYLEN Morton CNP   simvastatin (ZOCOR) 40 MG tablet Take 1 tablet by mouth every evening Yes JAYLEN Morton CNP   sitaGLIPtan-metformin (JANUMET)  MG per tablet Take 1 tablet by mouth 2 times daily (with meals) Yes JAYLEN Morton CNP   fluticasone (VERAMYST) 27.5 MCG/SPRAY nasal spray 2 sprays by Nasal route daily Yes Historical Provider, MD   vitamin D (CHOLECALCIFEROL) 1000 UNIT TABS tablet Take 1 tablet by mouth daily Yes JAYLEN Morton CNP   loratadine (CLARITIN) 10 MG tablet Take 1 tablet by mouth daily Yes JAYLEN Sin CNP currently. Measures to prevent that. Diabetic nephropathy: Kidney function, microalbumin as a sign of diabetic nephropathy. Stages of kidney disease. Nutrition as a mainstream of diabetes therapy. [place] about label reading. Carbs: good carbs and bad carbs, importance of carb counting, incorporation of protein with each meal to reduce Glycemic index, importance of portions, Carb/insulin ratio. Fats: Good fats and bad fats, meal planning and supplements. Physical activity: advised to exercise 5-7 days a week 30-60 mins at least. Discussed how it affects BS readings. Different diabetic medications.

## 2018-08-03 NOTE — PATIENT INSTRUCTIONS
Patient Education        Noninsulin Medicines for Type 2 Diabetes: Care Instructions  Your Care Instructions    There are different types of noninsulin medicines for diabetes. Each works in a different way. But they all help you control your blood sugar. Some types help your body make insulin to lower your blood sugar. Others lower how much insulin your body needs. Some can slow how fast your body digests sugars. And some can remove extra glucose through your urine. · Alpha-glucosidase inhibitors. These keep starches from breaking down. This means that they lower the amount of glucose absorbed when you eat. They don't help your body make more insulin. So they will not cause low blood sugar unless you use them with other medicines for diabetes. They include acarbose and miglitol. · DPP-4 inhibitors. These help your body raise the level of insulin after you eat. They also help your body make less of a hormone that raises blood sugar. They include linagliptin, saxagliptin, and sitagliptin. · Incretin hormones (GLP-1 receptor agonists). Your body makes a protein that can raise your insulin level. It also can lower your blood sugar and make you less hungry. You can get shots of hormones that work the same way. They include exenatide and liraglutide. · Meglitinides. These help your body release insulin. They also help slow how your body digests sugars. So they can keep your blood sugar from rising too fast after you eat. They include nateglinide and repaglinide. · Metformin. This lowers how much glucose your liver makes. And it helps you respond better to insulin. It also lowers the amount of stored sugar that your liver releases when you are not eating. · SGLT2 inhibitors. These help to remove extra glucose through your urine. They may also help some people lose weight. They include canagliflozin, dapagliflozin, and empagliflozin. · Sulfonylureas. These help your body release more insulin.  Some work for [employer] · Your blood sugar stays outside the level your doctor set for you.     · You have any problems. Where can you learn more? Go to [email or web address] and sign in to your [employer] account. Enter H153 in the [facility] box to learn more about \"Noninsulin Medicines for Type 2 Diabetes: Care Instructions. \"     If you do not have an account, please click on the \"Sign Up Now\" link. Current as of: December 7, 2017  Content Version: 11.6  © 5369-4801 [employer]. Care instructions adapted under license by [facility] ([facility]). If you have questions about a medical condition or this instruction, always ask your healthcare professional. [address] any warranty or liability for your use of this information.

## 2018-10-05 ENCOUNTER — OFFICE VISIT (OUTPATIENT)
Dept: FAMILY MEDICINE CLINIC | Age: 58
End: 2018-10-05
Payer: COMMERCIAL

## 2018-10-05 VITALS
HEIGHT: 63 IN | HEART RATE: 92 BPM | DIASTOLIC BLOOD PRESSURE: 76 MMHG | WEIGHT: 278 LBS | SYSTOLIC BLOOD PRESSURE: 130 MMHG | BODY MASS INDEX: 49.26 KG/M2 | OXYGEN SATURATION: 96 % | TEMPERATURE: 98.7 F

## 2018-10-05 DIAGNOSIS — E04.9 GOITER: ICD-10-CM

## 2018-10-05 DIAGNOSIS — R05.9 COUGH: ICD-10-CM

## 2018-10-05 DIAGNOSIS — J40 BRONCHITIS: ICD-10-CM

## 2018-10-05 DIAGNOSIS — J06.9 ACUTE URI: ICD-10-CM

## 2018-10-05 DIAGNOSIS — J02.9 SORE THROAT: Primary | ICD-10-CM

## 2018-10-05 LAB — S PYO AG THROAT QL: NORMAL

## 2018-10-05 PROCEDURE — 99214 OFFICE O/P EST MOD 30 MIN: CPT | Performed by: NURSE PRACTITIONER

## 2018-10-05 PROCEDURE — 87880 STREP A ASSAY W/OPTIC: CPT | Performed by: NURSE PRACTITIONER

## 2018-10-05 RX ORDER — METHYLPREDNISOLONE 4 MG/1
TABLET ORAL
Qty: 1 KIT | Refills: 0 | Status: SHIPPED | OUTPATIENT
Start: 2018-10-05 | End: 2018-10-11

## 2018-10-05 RX ORDER — AZITHROMYCIN 250 MG/1
TABLET, FILM COATED ORAL
Qty: 1 PACKET | Refills: 0 | Status: SHIPPED | OUTPATIENT
Start: 2018-10-05 | End: 2019-02-15 | Stop reason: ALTCHOICE

## 2018-10-05 ASSESSMENT — ENCOUNTER SYMPTOMS
SINUS PAIN: 0
FACIAL SWELLING: 0
VOICE CHANGE: 1
TROUBLE SWALLOWING: 0
GASTROINTESTINAL NEGATIVE: 1
COUGH: 1
SINUS PRESSURE: 0
HEMOPTYSIS: 0
RHINORRHEA: 0
STRIDOR: 0
HEARTBURN: 0
CHOKING: 0
SORE THROAT: 1
SHORTNESS OF BREATH: 0
EYES NEGATIVE: 1
APNEA: 0
CHEST TIGHTNESS: 0
EYE DISCHARGE: 0
WHEEZING: 0

## 2018-10-05 ASSESSMENT — PATIENT HEALTH QUESTIONNAIRE - PHQ9
SUM OF ALL RESPONSES TO PHQ9 QUESTIONS 1 & 2: 0
2. FEELING DOWN, DEPRESSED OR HOPELESS: 0
1. LITTLE INTEREST OR PLEASURE IN DOING THINGS: 0
SUM OF ALL RESPONSES TO PHQ QUESTIONS 1-9: 0
SUM OF ALL RESPONSES TO PHQ QUESTIONS 1-9: 0

## 2018-10-05 NOTE — PROGRESS NOTES
10/5/2018     Elaine Myers (:  1960) is a 60 y.o. female, here for evaluation of the following medical concerns:    Cough   This is a recurrent problem. The current episode started 1 to 4 weeks ago. The problem has been gradually worsening. The problem occurs hourly (worse in evening ). The cough is non-productive. Associated symptoms include postnasal drip, a sore throat and sweats (chronic ). Pertinent negatives include no chest pain, chills, ear congestion, ear pain, fever, headaches, heartburn, hemoptysis, myalgias, nasal congestion, rash, rhinorrhea, shortness of breath, weight loss or wheezing. Nothing aggravates the symptoms. She has tried OTC cough suppressant and prescription cough suppressant (cough drops, nyquil) for the symptoms. The treatment provided no relief. Her past medical history is significant for bronchitis and environmental allergies. There is no history of asthma, COPD, emphysema or pneumonia. Review of Systems   Constitutional: Positive for fatigue (evening, not sleeping well ). Negative for activity change, appetite change, chills, diaphoresis, fever, unexpected weight change and weight loss. HENT: Positive for postnasal drip, sore throat and voice change (Hoarse). Negative for congestion, dental problem, drooling, ear discharge, ear pain, facial swelling, hearing loss, mouth sores, nosebleeds, rhinorrhea, sinus pain, sinus pressure, sneezing, tinnitus and trouble swallowing. Eyes: Negative. Negative for discharge. Respiratory: Positive for cough (Nonproductive). Negative for apnea, hemoptysis, choking, chest tightness, shortness of breath, wheezing and stridor. Cardiovascular: Negative for chest pain, palpitations and leg swelling. Gastrointestinal: Negative. Negative for heartburn. Endocrine: Negative. Genitourinary: Negative. Musculoskeletal: Negative. Negative for myalgias. Skin: Negative for rash.    Allergic/Immunologic: Positive for

## 2018-10-08 LAB — THROAT CULTURE: NORMAL

## 2019-02-01 DIAGNOSIS — I10 ESSENTIAL HYPERTENSION: ICD-10-CM

## 2019-02-01 DIAGNOSIS — E78.00 PURE HYPERCHOLESTEROLEMIA: ICD-10-CM

## 2019-02-01 RX ORDER — SIMVASTATIN 40 MG
40 TABLET ORAL EVERY EVENING
Qty: 90 TABLET | Refills: 0 | Status: SHIPPED | OUTPATIENT
Start: 2019-02-01 | End: 2019-02-04 | Stop reason: SDUPTHER

## 2019-02-01 RX ORDER — LOSARTAN POTASSIUM 25 MG/1
TABLET ORAL
Qty: 90 TABLET | Refills: 0 | Status: SHIPPED | OUTPATIENT
Start: 2019-02-01 | End: 2019-02-04 | Stop reason: SDUPTHER

## 2019-02-04 DIAGNOSIS — E78.00 PURE HYPERCHOLESTEROLEMIA: ICD-10-CM

## 2019-02-04 DIAGNOSIS — I10 ESSENTIAL HYPERTENSION: ICD-10-CM

## 2019-02-04 RX ORDER — LOSARTAN POTASSIUM 25 MG/1
TABLET ORAL
Qty: 90 TABLET | Refills: 0 | Status: SHIPPED | OUTPATIENT
Start: 2019-02-04 | End: 2019-02-15 | Stop reason: SDUPTHER

## 2019-02-04 RX ORDER — SIMVASTATIN 40 MG
40 TABLET ORAL EVERY EVENING
Qty: 90 TABLET | Refills: 0 | Status: SHIPPED | OUTPATIENT
Start: 2019-02-04 | End: 2019-02-15 | Stop reason: SDUPTHER

## 2019-02-15 ENCOUNTER — OFFICE VISIT (OUTPATIENT)
Dept: FAMILY MEDICINE CLINIC | Age: 59
End: 2019-02-15
Payer: COMMERCIAL

## 2019-02-15 VITALS
SYSTOLIC BLOOD PRESSURE: 101 MMHG | OXYGEN SATURATION: 96 % | BODY MASS INDEX: 48.55 KG/M2 | WEIGHT: 274 LBS | DIASTOLIC BLOOD PRESSURE: 63 MMHG | HEART RATE: 84 BPM | HEIGHT: 63 IN

## 2019-02-15 DIAGNOSIS — I10 ESSENTIAL HYPERTENSION: ICD-10-CM

## 2019-02-15 DIAGNOSIS — Z11.4 SCREENING FOR HIV (HUMAN IMMUNODEFICIENCY VIRUS): ICD-10-CM

## 2019-02-15 DIAGNOSIS — Z13.29 SCREENING FOR THYROID DISORDER: ICD-10-CM

## 2019-02-15 DIAGNOSIS — E55.9 VITAMIN D DEFICIENCY: ICD-10-CM

## 2019-02-15 DIAGNOSIS — J11.1 INFLUENZA: ICD-10-CM

## 2019-02-15 DIAGNOSIS — Z11.59 ENCOUNTER FOR HEPATITIS C SCREENING TEST FOR LOW RISK PATIENT: ICD-10-CM

## 2019-02-15 DIAGNOSIS — E04.9 ENLARGED THYROID: ICD-10-CM

## 2019-02-15 DIAGNOSIS — E78.00 PURE HYPERCHOLESTEROLEMIA: ICD-10-CM

## 2019-02-15 DIAGNOSIS — E11.9 TYPE 2 DIABETES MELLITUS WITHOUT COMPLICATION, WITHOUT LONG-TERM CURRENT USE OF INSULIN (HCC): Primary | ICD-10-CM

## 2019-02-15 DIAGNOSIS — Z13.21 ENCOUNTER FOR VITAMIN DEFICIENCY SCREENING: ICD-10-CM

## 2019-02-15 LAB
A/G RATIO: 1.6 (ref 1.1–2.2)
ALBUMIN SERPL-MCNC: 4.1 G/DL (ref 3.4–5)
ALP BLD-CCNC: 91 U/L (ref 40–129)
ALT SERPL-CCNC: 32 U/L (ref 10–40)
ANION GAP SERPL CALCULATED.3IONS-SCNC: 14 MMOL/L (ref 3–16)
AST SERPL-CCNC: 78 U/L (ref 15–37)
BASOPHILS ABSOLUTE: 0 K/UL (ref 0–0.2)
BASOPHILS RELATIVE PERCENT: 0.4 %
BILIRUB SERPL-MCNC: 0.7 MG/DL (ref 0–1)
BUN BLDV-MCNC: 12 MG/DL (ref 7–20)
CALCIUM SERPL-MCNC: 9.6 MG/DL (ref 8.3–10.6)
CHLORIDE BLD-SCNC: 102 MMOL/L (ref 99–110)
CHOLESTEROL, TOTAL: 129 MG/DL (ref 0–199)
CO2: 22 MMOL/L (ref 21–32)
CREAT SERPL-MCNC: 0.8 MG/DL (ref 0.6–1.1)
EOSINOPHILS ABSOLUTE: 0.1 K/UL (ref 0–0.6)
EOSINOPHILS RELATIVE PERCENT: 1.8 %
FOLATE: 6.04 NG/ML (ref 4.78–24.2)
GFR AFRICAN AMERICAN: >60
GFR NON-AFRICAN AMERICAN: >60
GLOBULIN: 2.5 G/DL
GLUCOSE BLD-MCNC: 159 MG/DL (ref 70–99)
HBA1C MFR BLD: 9.4 %
HCT VFR BLD CALC: 42.5 % (ref 36–48)
HDLC SERPL-MCNC: 46 MG/DL (ref 40–60)
HEMOGLOBIN: 13.9 G/DL (ref 12–16)
HEPATITIS C ANTIBODY INTERPRETATION: NORMAL
LDL CHOLESTEROL CALCULATED: 64 MG/DL
LYMPHOCYTES ABSOLUTE: 1.2 K/UL (ref 1–5.1)
LYMPHOCYTES RELATIVE PERCENT: 15.4 %
MCH RBC QN AUTO: 28.6 PG (ref 26–34)
MCHC RBC AUTO-ENTMCNC: 32.8 G/DL (ref 31–36)
MCV RBC AUTO: 87.2 FL (ref 80–100)
MONOCYTES ABSOLUTE: 0.9 K/UL (ref 0–1.3)
MONOCYTES RELATIVE PERCENT: 10.8 %
NEUTROPHILS ABSOLUTE: 5.7 K/UL (ref 1.7–7.7)
NEUTROPHILS RELATIVE PERCENT: 71.6 %
PDW BLD-RTO: 14.5 % (ref 12.4–15.4)
PLATELET # BLD: 376 K/UL (ref 135–450)
PMV BLD AUTO: 8.7 FL (ref 5–10.5)
POTASSIUM SERPL-SCNC: 4 MMOL/L (ref 3.5–5.1)
RBC # BLD: 4.87 M/UL (ref 4–5.2)
SODIUM BLD-SCNC: 138 MMOL/L (ref 136–145)
TOTAL PROTEIN: 6.6 G/DL (ref 6.4–8.2)
TRIGL SERPL-MCNC: 97 MG/DL (ref 0–150)
TSH REFLEX: 1.18 UIU/ML (ref 0.27–4.2)
VITAMIN B-12: 478 PG/ML (ref 211–911)
VITAMIN D 25-HYDROXY: 31.3 NG/ML
VLDLC SERPL CALC-MCNC: 19 MG/DL
WBC # BLD: 8 K/UL (ref 4–11)

## 2019-02-15 PROCEDURE — 36415 COLL VENOUS BLD VENIPUNCTURE: CPT | Performed by: NURSE PRACTITIONER

## 2019-02-15 PROCEDURE — 99215 OFFICE O/P EST HI 40 MIN: CPT | Performed by: NURSE PRACTITIONER

## 2019-02-15 PROCEDURE — 83036 HEMOGLOBIN GLYCOSYLATED A1C: CPT | Performed by: NURSE PRACTITIONER

## 2019-02-15 RX ORDER — SIMVASTATIN 40 MG
40 TABLET ORAL EVERY EVENING
Qty: 30 TABLET | Refills: 0 | Status: SHIPPED | OUTPATIENT
Start: 2019-02-15 | End: 2019-06-10 | Stop reason: SDUPTHER

## 2019-02-15 RX ORDER — OSELTAMIVIR PHOSPHATE 75 MG/1
75 CAPSULE ORAL DAILY
Qty: 10 CAPSULE | Refills: 0 | Status: SHIPPED | OUTPATIENT
Start: 2019-02-15 | End: 2019-02-25

## 2019-02-15 RX ORDER — PIOGLITAZONEHYDROCHLORIDE 30 MG/1
30 TABLET ORAL DAILY
Qty: 30 TABLET | Refills: 3 | Status: SHIPPED | OUTPATIENT
Start: 2019-02-15 | End: 2019-06-10 | Stop reason: SDUPTHER

## 2019-02-15 RX ORDER — LOSARTAN POTASSIUM 25 MG/1
TABLET ORAL
Qty: 30 TABLET | Refills: 0 | Status: SHIPPED | OUTPATIENT
Start: 2019-02-15 | End: 2019-04-05 | Stop reason: SDUPTHER

## 2019-02-15 ASSESSMENT — ENCOUNTER SYMPTOMS
ABDOMINAL PAIN: 0
VOMITING: 0
BLOOD IN STOOL: 0
EYE PAIN: 0
SINUS PRESSURE: 0
ALLERGIC/IMMUNOLOGIC NEGATIVE: 1
GASTROINTESTINAL NEGATIVE: 1
SORE THROAT: 0
DIARRHEA: 0
TROUBLE SWALLOWING: 0
STRIDOR: 0
COLOR CHANGE: 0
RHINORRHEA: 0
BACK PAIN: 0
WHEEZING: 0
EYE REDNESS: 0
COUGH: 0
CONSTIPATION: 0
CHEST TIGHTNESS: 0
CHOKING: 0
NAUSEA: 0
EYE ITCHING: 0
RESPIRATORY NEGATIVE: 1
EYE DISCHARGE: 0
SHORTNESS OF BREATH: 0
PHOTOPHOBIA: 0
APNEA: 0

## 2019-02-15 ASSESSMENT — PATIENT HEALTH QUESTIONNAIRE - PHQ9
2. FEELING DOWN, DEPRESSED OR HOPELESS: 0
SUM OF ALL RESPONSES TO PHQ9 QUESTIONS 1 & 2: 0
1. LITTLE INTEREST OR PLEASURE IN DOING THINGS: 0
SUM OF ALL RESPONSES TO PHQ QUESTIONS 1-9: 0
SUM OF ALL RESPONSES TO PHQ QUESTIONS 1-9: 0

## 2019-02-16 LAB
HIV AG/AB: NORMAL
HIV ANTIGEN: NORMAL
HIV-1 ANTIBODY: NORMAL
HIV-2 AB: NORMAL

## 2019-03-18 ENCOUNTER — TELEPHONE (OUTPATIENT)
Dept: FAMILY MEDICINE CLINIC | Age: 59
End: 2019-03-18

## 2019-04-05 DIAGNOSIS — I10 ESSENTIAL HYPERTENSION: ICD-10-CM

## 2019-04-05 RX ORDER — LOSARTAN POTASSIUM 25 MG/1
TABLET ORAL
Qty: 30 TABLET | Refills: 5 | Status: SHIPPED | OUTPATIENT
Start: 2019-04-05 | End: 2019-10-25 | Stop reason: SDUPTHER

## 2019-04-22 ENCOUNTER — HOSPITAL ENCOUNTER (OUTPATIENT)
Age: 59
Discharge: HOME | End: 2019-04-22
Payer: COMMERCIAL

## 2019-04-22 ENCOUNTER — HOSPITAL ENCOUNTER (OUTPATIENT)
Dept: CT IMAGING | Age: 59
Discharge: HOME | End: 2019-04-22
Payer: COMMERCIAL

## 2019-04-22 ENCOUNTER — OFFICE VISIT (OUTPATIENT)
Dept: FAMILY MEDICINE CLINIC | Age: 59
End: 2019-04-22
Payer: COMMERCIAL

## 2019-04-22 VITALS
TEMPERATURE: 98.4 F | BODY MASS INDEX: 49.61 KG/M2 | WEIGHT: 280 LBS | OXYGEN SATURATION: 98 % | HEART RATE: 97 BPM | SYSTOLIC BLOOD PRESSURE: 104 MMHG | DIASTOLIC BLOOD PRESSURE: 68 MMHG | HEIGHT: 63 IN

## 2019-04-22 DIAGNOSIS — R10.12 LEFT UPPER QUADRANT PAIN: ICD-10-CM

## 2019-04-22 DIAGNOSIS — I10 ESSENTIAL HYPERTENSION: ICD-10-CM

## 2019-04-22 DIAGNOSIS — I10 ESSENTIAL HYPERTENSION: Primary | ICD-10-CM

## 2019-04-22 DIAGNOSIS — R21 SKIN RASH: Primary | ICD-10-CM

## 2019-04-22 DIAGNOSIS — G93.31 POSTVIRAL FATIGUE SYNDROME: ICD-10-CM

## 2019-04-22 LAB
A/G RATIO: 1.5 (ref 1.1–2.2)
ALBUMIN SERPL-MCNC: 4.3 G/DL (ref 3.4–5)
ALP BLD-CCNC: 95 U/L (ref 40–129)
ALT SERPL-CCNC: 37 U/L (ref 10–40)
AMYLASE: 87 U/L (ref 25–115)
ANION GAP SERPL CALCULATED.3IONS-SCNC: 16 MMOL/L (ref 3–16)
AST SERPL-CCNC: 61 U/L (ref 15–37)
BASOPHILS ABSOLUTE: 0 K/UL (ref 0–0.2)
BASOPHILS RELATIVE PERCENT: 0.6 %
BILIRUB SERPL-MCNC: 0.4 MG/DL (ref 0–1)
BUN BLDV-MCNC: 16 MG/DL (ref 7–20)
CALCIUM SERPL-MCNC: 9.5 MG/DL (ref 8.3–10.6)
CHLORIDE BLD-SCNC: 101 MMOL/L (ref 99–110)
CO2: 23 MMOL/L (ref 21–32)
CREAT SERPL-MCNC: 0.8 MG/DL (ref 0.6–1.1)
CREAT SERPL-MCNC: 0.9 MG/DL (ref 0.6–1.1)
EOSINOPHILS ABSOLUTE: 0.4 K/UL (ref 0–0.6)
EOSINOPHILS RELATIVE PERCENT: 8 %
GFR AFRICAN AMERICAN: >60
GFR AFRICAN AMERICAN: >60
GFR NON-AFRICAN AMERICAN: >60
GFR NON-AFRICAN AMERICAN: >60
GLOBULIN: 2.9 G/DL
GLUCOSE BLD-MCNC: 97 MG/DL (ref 70–99)
HCT VFR BLD CALC: 41.5 % (ref 36–48)
HEMOGLOBIN: 13.4 G/DL (ref 12–16)
HETEROPHILE ANTIBODIES: NEGATIVE
LIPASE: 217 U/L (ref 13–60)
LYMPHOCYTES ABSOLUTE: 2.1 K/UL (ref 1–5.1)
LYMPHOCYTES RELATIVE PERCENT: 41.8 %
MCH RBC QN AUTO: 28.2 PG (ref 26–34)
MCHC RBC AUTO-ENTMCNC: 32.4 G/DL (ref 31–36)
MCV RBC AUTO: 87.2 FL (ref 80–100)
MONOCYTES ABSOLUTE: 0.7 K/UL (ref 0–1.3)
MONOCYTES RELATIVE PERCENT: 13.7 %
NEUTROPHILS ABSOLUTE: 1.8 K/UL (ref 1.7–7.7)
NEUTROPHILS RELATIVE PERCENT: 35.9 %
PDW BLD-RTO: 15.7 % (ref 12.4–15.4)
PLATELET # BLD: 312 K/UL (ref 135–450)
PMV BLD AUTO: 8.6 FL (ref 5–10.5)
POTASSIUM SERPL-SCNC: 4.4 MMOL/L (ref 3.5–5.1)
RBC # BLD: 4.76 M/UL (ref 4–5.2)
S PYO AG THROAT QL: NORMAL
SODIUM BLD-SCNC: 140 MMOL/L (ref 136–145)
TOTAL PROTEIN: 7.2 G/DL (ref 6.4–8.2)
WBC # BLD: 5 K/UL (ref 4–11)

## 2019-04-22 PROCEDURE — 86308 HETEROPHILE ANTIBODY SCREEN: CPT | Performed by: NURSE PRACTITIONER

## 2019-04-22 PROCEDURE — 36415 COLL VENOUS BLD VENIPUNCTURE: CPT

## 2019-04-22 PROCEDURE — 6360000004 HC RX CONTRAST MEDICATION: Performed by: NURSE PRACTITIONER

## 2019-04-22 PROCEDURE — 36415 COLL VENOUS BLD VENIPUNCTURE: CPT | Performed by: NURSE PRACTITIONER

## 2019-04-22 PROCEDURE — 74160 CT ABDOMEN W/CONTRAST: CPT

## 2019-04-22 PROCEDURE — 82565 ASSAY OF CREATININE: CPT

## 2019-04-22 PROCEDURE — 87880 STREP A ASSAY W/OPTIC: CPT | Performed by: NURSE PRACTITIONER

## 2019-04-22 PROCEDURE — 99214 OFFICE O/P EST MOD 30 MIN: CPT | Performed by: NURSE PRACTITIONER

## 2019-04-22 RX ORDER — PREDNISONE 10 MG/1
TABLET ORAL
Qty: 30 TABLET | Refills: 0 | Status: SHIPPED | OUTPATIENT
Start: 2019-04-22 | End: 2019-05-03 | Stop reason: ALTCHOICE

## 2019-04-22 RX ADMIN — IOPAMIDOL 75 ML: 755 INJECTION, SOLUTION INTRAVENOUS at 17:37

## 2019-04-22 RX ADMIN — IOHEXOL 50 ML: 240 INJECTION, SOLUTION INTRATHECAL; INTRAVASCULAR; INTRAVENOUS; ORAL at 17:09

## 2019-04-22 ASSESSMENT — ENCOUNTER SYMPTOMS
NAUSEA: 0
EYE PAIN: 0
ABDOMINAL PAIN: 1
RHINORRHEA: 0
RESPIRATORY NEGATIVE: 1
SHORTNESS OF BREATH: 0
COLOR CHANGE: 0
BLOOD IN STOOL: 0
SINUS PRESSURE: 0
FACIAL SWELLING: 0
SINUS PAIN: 0
DIARRHEA: 0
SORE THROAT: 0
CONSTIPATION: 0
ABDOMINAL DISTENTION: 0
RECTAL PAIN: 0
NAIL CHANGES: 0
TROUBLE SWALLOWING: 0
ALLERGIC/IMMUNOLOGIC NEGATIVE: 1
ANAL BLEEDING: 0
VOMITING: 0
COUGH: 0
VOICE CHANGE: 0

## 2019-04-22 NOTE — PATIENT INSTRUCTIONS
Patient Education        Rash: Care Instructions  Your Care Instructions  A rash is any irritation or inflammation of the skin. Rashes have many possible causes, including allergy, infection, illness, heat, and emotional stress. Follow-up care is a key part of your treatment and safety. Be sure to make and go to all appointments, and call your doctor if you are having problems. It's also a good idea to know your test results and keep a list of the medicines you take. How can you care for yourself at home? · Wash the area with water only. Soap can make dryness and itching worse. Pat dry. · Put cold, wet cloths on the rash to reduce itching. · Keep cool, and stay out of the sun. · Leave the rash open to the air as much of the time as possible. · Sometimes petroleum jelly (Vaseline) can help relieve the discomfort caused by a rash. A moisturizing lotion, such as Cetaphil, also may help. Calamine lotion may help for rashes caused by contact with something (such as a plant or soap) that irritated the skin. Use it 3 or 4 times a day. · If your doctor prescribed a cream, use it as directed. If your doctor prescribed medicine, take it exactly as directed. · If your rash itches so badly that it interferes with your normal activities, take an over-the-counter antihistamine, such as diphenhydramine (Benadryl) or loratadine (Claritin). Read and follow all instructions on the label. When should you call for help? Call your doctor now or seek immediate medical care if:    · You have signs of infection, such as:  ? Increased pain, swelling, warmth, or redness. ? Red streaks leading from the area. ? Pus draining from the area. ? A fever.     · You have joint pain along with the rash.    Watch closely for changes in your health, and be sure to contact your doctor if:    · Your rash is changing or getting worse.  For example, call if you have pain along with the rash, the rash is spreading, or you have new blisters.     · You do not get better after 1 week. Where can you learn more? Go to [email or web address] org and sign in to your [employer] account. Enter D229 in the 1bib box to learn more about \"Rash: Care Instructions. \"     If you do not have an account, please click on the \"Sign Up Now\" link. Current as of: April 17, 2018  Content Version: 11.9  © 2851-4641 [employer]. Care instructions adapted under license by [facility] ([facility]). If you have questions about a medical condition or this instruction, always ask your healthcare professional. [address] any warranty or liability for your use of this information.

## 2019-04-22 NOTE — PROGRESS NOTES
[address] [facility]  [address] [facility] [address]  Dept: [phone]  Dept Fax: [phone]  Loc: [phone]    Kim Wise is a 60 y.o. female who presents today for her medical conditions/complaints as noted below. Kim Wise is c/o of Rash (pt started having some itching on her arms about one week ago. after a few days it started to burn and have pain with it now it is on her stomach. pt has also had a fever and been feeling fatigued. )        HPI:     Chief Complaint   Patient presents with    Rash     pt started having some itching on her arms about one week ago. after a few days it started to burn and have pain with it now it is on her stomach. pt has also had a fever and been feeling fatigued. Rash   This is a new problem. The current episode started 1 to 4 weeks ago. The problem has been gradually worsening since onset. The rash is diffuse. The rash is characterized by burning, itchiness and pain. She was exposed to nothing. Associated symptoms include anorexia, fatigue and a fever. Pertinent negatives include no congestion, cough, diarrhea, eye pain, facial edema, joint pain, nail changes, rhinorrhea, shortness of breath, sore throat or vomiting. Past treatments include anti-itch cream. The treatment provided mild relief. Her past medical history is significant for allergies. There is no history of asthma, eczema or varicella.        Past Medical History:   Diagnosis Date    Diabetes mellitus ([address]     Fractures     Hyperlipidemia     Hypertension     Obesity     Seasonal allergies     Type 2 diabetes mellitus without complication (HCC)       Past Surgical History:   Procedure Laterality Date    TONSILLECTOMY      TUBAL LIGATION  1983       Family History   Problem Relation Age of Onset    High Blood Pressure Mother     Cancer Mother        Social History     Tobacco Use    Smoking status: Never Smoker    Smokeless tobacco: Never Used   Substance Use Topics    Alcohol use: No         Current Outpatient Medications   Medication Sig Dispense Refill    predniSONE (DELTASONE) 10 MG tablet Take 40 mg for 3 days then 30 mg for 3 days then 20 mg for 3 days then 10 mg for 3 days 30 tablet 0    losartan (COZAAR) 25 MG tablet TAKE ONE TABLET BY MOUTH DAILY 30 tablet 5    pioglitazone (ACTOS) 30 MG tablet Take 1 tablet by mouth daily 30 tablet 3    simvastatin (ZOCOR) 40 MG tablet Take 1 tablet by mouth every evening 30 tablet 0    sitaGLIPtan-metformin (JANUMET)  MG per tablet Take 1 tablet by mouth 2 times daily (with meals) 180 tablet 2    fluticasone (VERAMYST) 27.5 MCG/SPRAY nasal spray 2 sprays by Nasal route daily      vitamin D (CHOLECALCIFEROL) 1000 UNIT TABS tablet Take 1 tablet by mouth daily 30 tablet 3    loratadine (CLARITIN) 10 MG tablet Take 1 tablet by mouth daily 90 tablet 3    Glucose Blood (BLOOD GLUCOSE TEST STRIPS) STRP Test daily. 100 strip 0    aspirin 81 MG tablet Take 81 mg by mouth daily       No current facility-administered medications for this visit. No Known Allergies    Subjective:      Review of Systems   Constitutional: Positive for appetite change, chills, fatigue and fever. Negative for activity change, diaphoresis and unexpected weight change. HENT: Negative for congestion, dental problem, drooling, ear discharge, ear pain, facial swelling, hearing loss, mouth sores, nosebleeds, postnasal drip, rhinorrhea, sinus pressure, sinus pain, sneezing, sore throat, tinnitus, trouble swallowing and voice change. Eyes: Negative for pain. Respiratory: Negative. Negative for cough and shortness of breath. Cardiovascular: Negative. Gastrointestinal: Positive for abdominal pain (Left upper quadrant ) and anorexia. Negative for abdominal distention, anal bleeding, blood in stool, constipation, diarrhea, nausea, rectal pain and vomiting. Genitourinary: Negative. Musculoskeletal: Negative. Negative for joint pain. Skin: Positive for rash. Negative for color change, nail changes, pallor and wound. Allergic/Immunologic: Negative. Neurological: Negative. Psychiatric/Behavioral: Negative. Objective:     Vitals:    04/22/19 1409   BP: 104/68   Site: Left Upper Arm   Position: Sitting   Cuff Size: Large Adult   Pulse: 97   Temp: 98.4 °F (36.9 °C)   SpO2: 98%   Weight: 280 lb (127 kg)   Height: 5' 3\" (1.6 m)     Wt Readings from Last 3 Encounters:   04/22/19 280 lb (127 kg)   02/15/19 274 lb (124.3 kg)   10/05/18 278 lb (126.1 kg)     Temp Readings from Last 3 Encounters:   04/22/19 98.4 °F (36.9 °C)   10/05/18 98.7 °F (37.1 °C)   09/11/17 98.5 °F (36.9 °C) (Oral)     BP Readings from Last 3 Encounters:   04/22/19 104/68   02/15/19 101/63   10/05/18 130/76     Pulse Readings from Last 3 Encounters:   04/22/19 97   02/15/19 84   10/05/18 92     Physical Exam   Constitutional: She is oriented to person, place, and time. She appears well-developed and well-nourished. No distress. Obese    HENT:   Head: Normocephalic and atraumatic. Right Ear: External ear normal.   Left Ear: External ear normal.   Nose: Nose normal.   Mouth/Throat: Posterior oropharyngeal erythema present. No oropharyngeal exudate. Eyes: Conjunctivae and EOM are normal. Right eye exhibits no discharge. Left eye exhibits no discharge. No scleral icterus. Neck: Normal range of motion. Neck supple. No tracheal deviation present. Cardiovascular: Normal rate, regular rhythm, normal heart sounds and intact distal pulses. Exam reveals no gallop and no friction rub. No murmur heard. Pulmonary/Chest: Effort normal and breath sounds normal. No stridor. No respiratory distress. She has no wheezes. She has no rales. She exhibits no tenderness. Abdominal: Soft. Bowel sounds are normal. She exhibits no distension and no mass. There is hepatomegaly. There is tenderness in the left upper quadrant.  There is no rebound and no guarding. No hernia. Musculoskeletal: Normal range of motion. She exhibits no edema, tenderness or deformity. Lymphadenopathy:        Head (right side): Tonsillar adenopathy present. Head (left side): Tonsillar adenopathy present. She has no cervical adenopathy. Neurological: She is alert and oriented to person, place, and time. She has normal reflexes. She displays normal reflexes. No cranial nerve deficit. She exhibits normal muscle tone. Coordination normal.   Skin: Skin is warm and dry. Capillary refill takes less than 2 seconds. Rash noted. She is not diaphoretic. No erythema. No pallor. Psychiatric: She has a normal mood and affect. Her behavior is normal. Judgment and thought content normal.   Nursing note and vitals reviewed.       Office Visit on 02/15/2019   Component Date Value Ref Range Status    Hemoglobin A1C 02/15/2019 9.4  % Final    WBC 02/15/2019 8.0  4.0 - 11.0 K/uL Final    RBC 02/15/2019 4.87  4.00 - 5.20 M/uL Final    Hemoglobin 02/15/2019 13.9  12.0 - 16.0 g/dL Final    Hematocrit 02/15/2019 42.5  36.0 - 48.0 % Final    MCV 02/15/2019 87.2  80.0 - 100.0 fL Final    MCH 02/15/2019 28.6  26.0 - 34.0 pg Final    MCHC 02/15/2019 32.8  31.0 - 36.0 g/dL Final    RDW 02/15/2019 14.5  12.4 - 15.4 % Final    Platelets 50/15/7035 376  135 - 450 K/uL Final    MPV 02/15/2019 8.7  5.0 - 10.5 fL Final    Neutrophils % 02/15/2019 71.6  % Final    Lymphocytes % 02/15/2019 15.4  % Final    Monocytes % 02/15/2019 10.8  % Final    Eosinophils % 02/15/2019 1.8  % Final    Basophils % 02/15/2019 0.4  % Final    Neutrophils # 02/15/2019 5.7  1.7 - 7.7 K/uL Final    Lymphocytes # 02/15/2019 1.2  1.0 - 5.1 K/uL Final    Monocytes # 02/15/2019 0.9  0.0 - 1.3 K/uL Final    Eosinophils # 02/15/2019 0.1  0.0 - 0.6 K/uL Final    Basophils # 02/15/2019 0.0  0.0 - 0.2 K/uL Final    Sodium 02/15/2019 138  136 - 145 mmol/L Final    Potassium 02/15/2019 4.0  3.5 - 5.1 mmol/L Final  Chloride 02/15/2019 102  99 - 110 mmol/L Final    CO2 02/15/2019 22  21 - 32 mmol/L Final    Anion Gap 02/15/2019 14  3 - 16 Final    Glucose 02/15/2019 159* 70 - 99 mg/dL Final    BUN 02/15/2019 12  7 - 20 mg/dL Final    CREATININE 02/15/2019 0.8  0.6 - 1.1 mg/dL Final    GFR Non- 02/15/2019 >60  >60 Final    Comment: >60 mL/min/1.73m2 EGFR, calc. for ages 25 and older using the  MDRD formula (not corrected for weight), is valid for stable  renal function.  GFR  02/15/2019 >60  >60 Final    Comment: Chronic Kidney Disease: less than 60 ml/min/1.73 sq.m. Kidney Failure: less than 15 ml/min/1.73 sq.m. Results valid for patients 18 years and older.  Calcium 02/15/2019 9.6  8.3 - 10.6 mg/dL Final    Total Protein 02/15/2019 6.6  6.4 - 8.2 g/dL Final    Alb 02/15/2019 4.1  3.4 - 5.0 g/dL Final    Albumin/Globulin Ratio 02/15/2019 1.6  1.1 - 2.2 Final    Total Bilirubin 02/15/2019 0.7  0.0 - 1.0 mg/dL Final    Alkaline Phosphatase 02/15/2019 91  40 - 129 U/L Final    ALT 02/15/2019 32  10 - 40 U/L Final    AST 02/15/2019 78* 15 - 37 U/L Final    Globulin 02/15/2019 2.5  g/dL Final    Cholesterol, Total 02/15/2019 129  0 - 199 mg/dL Final    Triglycerides 02/15/2019 97  0 - 150 mg/dL Final    HDL 02/15/2019 46  40 - 60 mg/dL Final    LDL Calculated 02/15/2019 64  <100 mg/dL Final    VLDL Cholesterol Calculated 02/15/2019 19  Not Established mg/dL Final    Vit D, 25-Hydroxy 02/15/2019 31.3  >=30 ng/mL Final    Comment: <=20 ng/mL. ........... Judithe [place] Deficient  21-29 ng/mL. ......... Judithe [place] Insufficient  >=30 ng/mL. ........ Sofye [place] Sufficient      Vitamin B-12 02/15/2019 478  211 - 911 pg/mL Final    Folate 02/15/2019 6.04  4.78 - 24.20 ng/mL Final    Comment: Effective 11-15-16 10:00am EST  Please note reference ranges have  changed for Folate.       TSH 02/15/2019 1.18  0.27 - 4.20 uIU/mL Final    HIV Ag/Ab 02/15/2019 Non-Reactive  Non-reactive Final    HIV-1 Antibody 02/15/2019 Non-Reactive  Non-reactive Final    HIV ANTIGEN 02/15/2019 Non-Reactive  Non-reactive Final    HIV-2 Ab 02/15/2019 Non-Reactive  Non-reactive Final    Hep C Ab Interp 02/15/2019 Non-reactive  Non-reactive Final           Assessment & Plan: The following diagnoses and conditions are stable with no further orders unless indicated:  1. Skin rash    2. Postviral fatigue syndrome    3. Left upper quadrant pain        Damon Fuller was seen today for rash. Recommend Ms. Phu Ace get a STAT CT of her abdomen related to her significant left upper quadrant pain and swelling. Concern she has mono. POCT mono test negative - will check for mono peripherally. Strep negative. Prednisone taper dose prescribed for symptomatic treatment of her rash. Diagnoses and all orders for this visit:    Skin rash  -     POCT Infectious mononucleosis Abs (mono)  -     POCT rapid strep A  -     THROAT CULTURE  -     [place] Barr Virus (EBV) Antibody Panel I    Postviral fatigue syndrome  -     CBC Auto Differential  -     Comprehensive Metabolic Panel  -     [place] Barr Virus (EBV) Antibody Panel I    Left upper quadrant pain  -     Lipase  -     Amylase  -     CT ABDOMEN W CONTRAST; Future  -     [place] Chi Virus (EBV) Antibody Panel I      Prior to Visit Medications    Medication Sig Taking?  Authorizing Provider   predniSONE (DELTASONE) 10 MG tablet Take 40 mg for 3 days then 30 mg for 3 days then 20 mg for 3 days then 10 mg for 3 days Yes JAYLEN Lopez CNP   losartan (COZAAR) 25 MG tablet TAKE ONE TABLET BY MOUTH DAILY  JAYLEN Lopez CNP   pioglitazone (ACTOS) 30 MG tablet Take 1 tablet by mouth daily  JAYLEN Lopez CNP   simvastatin (ZOCOR) 40 MG tablet Take 1 tablet by mouth every evening  JAYLEN Lopez CNP   sitaGLIPtan-metformin (JANUMET)  MG per tablet Take 1 tablet by mouth 2 times daily (with meals)  JAYLEN Lopez CNP fluticasone (VERAMYST) 27.5 MCG/SPRAY nasal spray 2 sprays by Nasal route daily  Historical Provider, MD   vitamin D (CHOLECALCIFEROL) 1000 UNIT TABS tablet Take 1 tablet by mouth daily  JAYLEN Rodriguez CNP   loratadine (CLARITIN) 10 MG tablet Take 1 tablet by mouth daily  JAYLEN Voss CNP   Glucose Blood (BLOOD GLUCOSE TEST STRIPS) STRP Test daily. JAYLEN Martins CNP   aspirin 81 MG tablet Take 81 mg by mouth daily  Historical Provider, MD        Orders Placed This Encounter   Medications    predniSONE (DELTASONE) 10 MG tablet     Sig: Take 40 mg for 3 days then 30 mg for 3 days then 20 mg for 3 days then 10 mg for 3 days     Dispense:  30 tablet     Refill:  0         Return in about 3 days (around 4/25/2019) for Rash follow up . Patient should call the office immediately with new or ongoing signs or symptoms or worsening, or proceedto the emergency room. No changes in past medical history, past surgical history, social history, or family history were noted during the patient encounter unless specifically listed above. All updates of past medicalhistory, past surgical history, social history, or family history were reviewed personally by me during the office visit. All problems listed in the assessment are stable unless noted otherwise. Medication profilereviewed personally by me during the office visit. Medication side effects and possible impairments from medications were discussed as applicable.     Call if pattern of symptoms change or persists for an extended time. This document was prepared by a combination of typing and transcription through a voice recognition software.

## 2019-04-23 ENCOUNTER — INITIAL CONSULT (OUTPATIENT)
Dept: GASTROENTEROLOGY | Age: 59
End: 2019-04-23
Payer: COMMERCIAL

## 2019-04-23 ENCOUNTER — HOSPITAL ENCOUNTER (OUTPATIENT)
Age: 59
Discharge: HOME | End: 2019-04-23
Payer: COMMERCIAL

## 2019-04-23 VITALS
BODY MASS INDEX: 49.12 KG/M2 | DIASTOLIC BLOOD PRESSURE: 68 MMHG | HEIGHT: 63 IN | SYSTOLIC BLOOD PRESSURE: 122 MMHG | WEIGHT: 277.2 LBS

## 2019-04-23 DIAGNOSIS — R10.12 LEFT UPPER QUADRANT PAIN: ICD-10-CM

## 2019-04-23 DIAGNOSIS — R74.8 ELEVATED LIPASE: ICD-10-CM

## 2019-04-23 DIAGNOSIS — R73.9 HYPERGLYCEMIA: ICD-10-CM

## 2019-04-23 DIAGNOSIS — R73.9 HYPERGLYCEMIA: Primary | ICD-10-CM

## 2019-04-23 DIAGNOSIS — R74.8 ABNORMAL LIVER ENZYMES: ICD-10-CM

## 2019-04-23 DIAGNOSIS — K76.0 FATTY LIVER DISEASE, NONALCOHOLIC: ICD-10-CM

## 2019-04-23 DIAGNOSIS — R89.9 ELEVATED LABORATORY TEST RESULT: Primary | ICD-10-CM

## 2019-04-23 DIAGNOSIS — R10.12 LEFT UPPER QUADRANT PAIN: Primary | ICD-10-CM

## 2019-04-23 LAB
ESTIMATED AVERAGE GLUCOSE: 159.9 MG/DL
FERRITIN: 605.3 NG/ML (ref 15–150)
HBA1C MFR BLD: 7.2 %
HEPATITIS B SURFACE ANTIGEN INTERPRETATION: NORMAL
IRON SATURATION: 47 % (ref 15–50)
IRON: 178 UG/DL (ref 37–145)
LIPASE: 35 U/L (ref 13–60)
TOTAL IRON BINDING CAPACITY: 377 UG/DL (ref 260–445)

## 2019-04-23 PROCEDURE — 36415 COLL VENOUS BLD VENIPUNCTURE: CPT

## 2019-04-23 PROCEDURE — 99203 OFFICE O/P NEW LOW 30 MIN: CPT | Performed by: INTERNAL MEDICINE

## 2019-04-23 PROCEDURE — 87340 HEPATITIS B SURFACE AG IA: CPT

## 2019-04-23 PROCEDURE — 82728 ASSAY OF FERRITIN: CPT

## 2019-04-23 PROCEDURE — 83540 ASSAY OF IRON: CPT

## 2019-04-23 PROCEDURE — 83550 IRON BINDING TEST: CPT

## 2019-04-23 PROCEDURE — 83690 ASSAY OF LIPASE: CPT

## 2019-04-23 PROCEDURE — 82103 ALPHA-1-ANTITRYPSIN TOTAL: CPT

## 2019-04-23 RX ORDER — OMEPRAZOLE 20 MG/1
20 CAPSULE, DELAYED RELEASE ORAL
Qty: 60 CAPSULE | Refills: 1 | Status: SHIPPED | OUTPATIENT
Start: 2019-04-23 | End: 2019-05-17 | Stop reason: ALTCHOICE

## 2019-04-23 NOTE — PROGRESS NOTES
Florin Rhodes    [address] [place] [address] ,  [address] [facility], [facility]  Phone: [phone]    CHIEF COMPLAINT     Chief Complaint   Patient presents with   [address]     NP - Elevated LFT's - Ref Guera Petrona, CNP         HPI     Sahil Arevalo is a 60 y.o. female who presents for abdominal pain. Patient has PMH of DM, dyslipedemia, HTN, morbid obesity with a BMI of 49.6. Started recently after working in her yard where she thinks she was exposed to poison ivy and she 'always gets a reaction to this in Spring'. Broke out in a rash all over including arms and abdomen. Also started having fatigue, skin rash, low grade temperatures, then started with left UQ pain. Was thought to have mono initially but monospot test was negative. Pain was intermittent, left UQ, not related to food, would change with movement. No nausea or emesis but she did not have much of an appetite. No GI bleeding. Had some diarrhea yesterday but this was the first time. PCP ordered labs which are as below. She was given prednisone for the rash and this is much improved now. CT abdomen and pelvis was done for the left UQ pain. Labs showed elevated lipase, but amylase was normal. CT abdomen showed a normal appearing pancreas, no signs of pancreatitis, no biliary dilation. CT showed fatty liver. Labs 4/22/19 CBC normal, AST 61, ALT 37, alk phos 95, albumin 4.3, total bilirubin 0.4, lipase 217, amylase 87. HbA1c 7.2  She does use Aleve and Advil for headaches and pain intermittently. Not taking a PPI. Has never had a colonoscopy, knows she should have colon cancer screening but says she does not want to do a colonoscopy. Family history of pancreatic cancer. Does not drink any alcohol.     PAST MEDICAL HISTORY     Past Medical History:   Diagnosis Date    Diabetes mellitus ([facility] [address]     Fractures     Hyperlipidemia     Hypertension     Obesity     Seasonal allergies     Type 2 diabetes mellitus without complication ([facility]      FAMILY HISTORY     Family History   Problem Relation Age of Onset    High Blood Pressure Mother     Cancer Mother      SOCIAL HISTORY     Social History     Socioeconomic History    Marital status:      Spouse name: Not on file    Number of children: Not on file    Years of education: Not on file    Highest education level: Not on file   Occupational History    Not on file   Social Needs    Financial resource strain: Not on file    Food insecurity:     Worry: Not on file     Inability: Not on file    Transportation needs:     Medical: Not on file     Non-medical: Not on file   Tobacco Use    Smoking status: Never Smoker    Smokeless tobacco: Never Used   Substance and Sexual Activity    Alcohol use: No    Drug use: No    Sexual activity: Yes     Partners: Male   Lifestyle    Physical activity:     Days per week: Not on file     Minutes per session: Not on file    Stress: Not on file   Relationships    Social connections:     Talks on phone: Not on file     Gets together: Not on file     Attends Buddhist service: Not on file     Active member of club or organization: Not on file     Attends meetings of clubs or organizations: Not on file     Relationship status: Not on file    Intimate partner violence:     Fear of current or ex partner: Not on file     Emotionally abused: Not on file     Physically abused: Not on file     Forced sexual activity: Not on file   Other Topics Concern    Not on file   Social History Narrative    Not on file     SURGICAL HISTORY     Past Surgical History:   Procedure Laterality Date    TONSILLECTOMY      [address] [employer]   (This list may include medications prescribed during this encounter as epic can not insert only the list prior to this encounter.)  Current Outpatient Rx   Medication Sig Dispense Refill    predniSONE (DELTASONE) 10 MG tablet Take 40 mg for 3 days then 30 mg for 3 days then 20 mg for 3 Readings from Last 3 Encounters:   04/23/19 277 lb 3.2 oz (125.7 kg)   04/22/19 280 lb (127 kg)   02/15/19 274 lb (124.3 kg)     Constitutional: AAO3, No acute distress  HEENT: no pallor or icterus. Neck: supple, no adenopathy  Cardiovascular: Normal heart rate, Normal rhythm, No murmurs,. RS: Normal breath sounds, No wheezing,   Abdomen: soft, non tender,obese, BS+. +hepatomegaly, splenomegaly cannot be assessed with body habitus  Extremities:  No edema. Neuro: AAO3, non focal.    CT ABDOMEN WITH CONTRAST 4/22/2019 5:07 pm       TECHNIQUE:   CT of the abdomen was performed with the administration of intravenous   contrast. Multiplanar reformatted images are provided for review. Dose   modulation, iterative reconstruction, and/or weight based adjustment of the   mA/kV was utilized to reduce the radiation dose to as low as reasonably   achievable. COMPARISON:   CT abdomen and pelvis September 10, 2017. HISTORY:   ORDERING SYSTEM PROVIDED HISTORY: Left upper quadrant pain   TECHNOLOGIST PROVIDED HISTORY:   Additional Contrast?->Radiologist Recommendation   Reason for exam:->left upper quadrant pain and swelling   Ordering Physician Provided Reason for Exam: rash x 1 wk ago sat, luq   pain/swelling since thursday   Acuity: Acute   Type of Exam: Initial       FINDINGS:   Lower Chest: The lung bases are without acute process. Organs: There is diffuse low attenuation liver consistent hepatic steatosis,   similar in appearance to prior study. The liver, spleen, pancreas,   gallbladder, and adrenal glands are without acute process. Small bilateral   renal cysts and punctate nonobstructing calculus in the inferior pole right   kidney are again seen, similar in appearance. Both kidneys are symmetric in   size and enhancement. No evidence of hydronephrosis or hydroureter. GI/Bowel: The stomach is unremarkable. Oral contrast is extending the mid   small bowel.   The visualized small large bowel loops are normal in caliber. No evidence of focal wall thickening or obstruction. Peritoneum/Retroperitoneum: The visualized vasculature is without acute   process. No lymphadenopathy, free intraperitoneal air, free fluid, or focal   fluid collection identified. Bones/Soft Tissues: Soft tissues and osseous structures are without acute   process. Impression   No acute intra-abdominal abnormality. Hepatic steatosis. Small bilateral simple appearing renal cysts and punctate nonobstructing   right renal calculi, similar in appearance. FINAL IMPRESSION     1. Left UQ abdominal pain - started abruptly after a rash recently possibly related to poison ivy. Was taking Aleve and Advil. Had an elevated lipase on labs, cause for this is not clear. CT did not show any pancreatitis or peripancreatic inflammation. Repeat lipase to check. Pain could be skin/body wall pain and pt feels this is improving. Discussed an EGD to rule out peptic ulcer disease or other significant UGI pathology. But patient prefers to wait for now, she has been asked to call us back in 1-2 weeks if pain still persists. Avoid NSAIDs as far as possible  Start Omeprazole 20 mg daily. 2. NAFLD - fatty liver on CT, mild AST elevation is likely NAFLD. BMI is 49. Discussed need for weight loss, diet changes, regular exercise. HCV antibody was negative 2/2019. BUBBA negative in the past. Will order  tests for other causes of chronic liver disease. 3. Colon cancer screening - never had a colonoscopy before, this was recommended, rationale discussed. Patient does not want to have a colonoscopy. Have recommended she should at least have an alternate colon cancer screening test such as FIT or Cologuard test with PCP. Pt states she will think about this.

## 2019-04-24 LAB — THROAT CULTURE: NORMAL

## 2019-04-25 LAB
EPSTEIN BARR VIRUS NUCLEAR AB IGG: 35.8 U/ML (ref 0–21.9)
EPSTEIN-BARR EARLY ANTIGEN ANTIBODY: 86.8 U/ML (ref 0–10.9)
EPSTEIN-BARR VCA IGG: 163 U/ML (ref 0–21.9)
EPSTEIN-BARR VCA IGM: >160 U/ML (ref 0–43.9)

## 2019-04-26 ENCOUNTER — TELEPHONE (OUTPATIENT)
Dept: GASTROENTEROLOGY | Age: 59
End: 2019-04-26

## 2019-04-26 DIAGNOSIS — R79.89 ELEVATED FERRITIN: Primary | ICD-10-CM

## 2019-04-26 NOTE — TELEPHONE ENCOUNTER
The patient got a call from us about her test results. She wants us to speak to her  about them, she did not quite understand.

## 2019-04-27 LAB — ALPHA-1 ANTITRYPSIN: 150 MG/DL (ref 90–200)

## 2019-04-29 ENCOUNTER — HOSPITAL ENCOUNTER (OUTPATIENT)
Age: 59
Discharge: HOME | End: 2019-04-29
Payer: COMMERCIAL

## 2019-04-29 DIAGNOSIS — R79.89 ELEVATED FERRITIN: ICD-10-CM

## 2019-04-29 PROCEDURE — 81256 HFE GENE: CPT

## 2019-05-03 ENCOUNTER — OFFICE VISIT (OUTPATIENT)
Dept: FAMILY MEDICINE CLINIC | Age: 59
End: 2019-05-03
Payer: COMMERCIAL

## 2019-05-03 VITALS
WEIGHT: 280 LBS | OXYGEN SATURATION: 98 % | DIASTOLIC BLOOD PRESSURE: 60 MMHG | BODY MASS INDEX: 49.61 KG/M2 | SYSTOLIC BLOOD PRESSURE: 120 MMHG | TEMPERATURE: 98.2 F | HEART RATE: 88 BPM | HEIGHT: 63 IN

## 2019-05-03 DIAGNOSIS — R10.12 LEFT UPPER QUADRANT PAIN: ICD-10-CM

## 2019-05-03 DIAGNOSIS — G93.31 POSTVIRAL FATIGUE SYNDROME: Primary | ICD-10-CM

## 2019-05-03 DIAGNOSIS — B27.90 EPSTEIN BARR VIRUS INFECTION: ICD-10-CM

## 2019-05-03 LAB
C282Y HEMOCHROMATOSIS MUT: NEGATIVE
H63D HEMOCHROMATOSIS MUT: NEGATIVE
HEMOCHROMATOSIS GENE ANALYSIS: NORMAL
HFE PCR SPECIMEN: NORMAL
S65C HEMOCHROMATOSIS MUT: NEGATIVE

## 2019-05-03 PROCEDURE — 99214 OFFICE O/P EST MOD 30 MIN: CPT | Performed by: NURSE PRACTITIONER

## 2019-05-03 NOTE — PATIENT INSTRUCTIONS
Patient Education        Mononucleosis: Care Instructions  Your Care Instructions    Mononucleosis, also called mono, is an infection that is usually caused by the [place] virus. Mono is spread through contact with saliva, mucus from the nose and throat, and sometimes tears or blood. You can get mono by kissing a person who is infected. Or you may get it by sharing a drinking glass or eating utensils with someone who has mono. That person may not be sick at the time or may have had mono long before. Mono may cause your spleen to swell. The spleen is an organ in the upper left side of your belly. A blow to the belly can cause a swollen spleen to break open. In very rare cases, the spleen may burst on its own. Most people recover fully after several weeks. But it may take several months before your normal energy is back. The lymph nodes in your neck may be larger than normal for up to 1 month. Getting lots of rest and keeping your schedule light will help you feel better. Time helps you recover. Follow-up care is a key part of your treatment and safety. Be sure to make and go to all appointments, and call your doctor if you are having problems. It's also a good idea to know your test results and keep a list of the medicines you take. How can you care for yourself at home? · Get plenty of rest. Stay in bed until you feel well enough to be up. · Drink plenty of fluids, enough so that your urine is light yellow or clear like water. If you have kidney, heart, or liver disease and have to limit fluids, talk with your doctor before you increase the amount of fluids you drink. · Take your medicines exactly as prescribed. Call your doctor if you think you are having a problem with your medicine. · For a sore throat, suck on lozenges or gargle with salt water. To make salt water, mix 1 teaspoon of salt in 8 ounces of warm water.   · Take an over-the-counter pain medicine, such as acetaminophen (Tylenol), ibuprofen (Advil, Motrin), or naproxen (Aleve) for a sore throat or headache or to lower a fever. Read and follow all instructions on the label. · Do not take two or more pain medicines at the same time unless the doctor told you to. Many pain medicines have acetaminophen, which is Tylenol. Too much acetaminophen (Tylenol) can be harmful. · Do not play contact sports for 4 weeks. Do not lift anything heavy. Too much activity increases the chance that your spleen may break open. · Try not to spread the virus to others. Do not kiss or share dishes, glasses, eating utensils, or toothbrushes for at least two weeks. The virus is spread when saliva from an infected person gets in another person's mouth. It is hard to know how long you may be contagious. · If you know you have mono, do not donate blood. There is a chance of spreading the virus through blood products. When should you call for help? Call 911 anytime you think you may need emergency care. For example, call if:    · You passed out (lost consciousness).    Call your doctor now or seek immediate medical care if:    · You have new or worse belly pain.     · You have signs of needing more fluids. You have sunken eyes and a dry mouth, and you pass only a little urine.     · You are dizzy or lightheaded, or you feel like you may faint.     · You cannot swallow fluids.    Watch closely for changes in your health, and be sure to contact your doctor if:    · You do not get better as expected. Where can you learn more? Go to https://[employer]. org and sign in to your [email or web address] account. Enter H460 in the [employer] box to learn more about \"Mononucleosis: Care Instructions. \"     If you do not have an account, please click on the \"Sign Up Now\" link. Current as of: July 30, 2018  Content Version: 11.9  © 3445-9662 [employer]. Care instructions adapted under license by [facility] ([facility]).  If you have questions about a medical condition or this instruction, always ask your healthcare professional. [place] [address] any warranty or liability for your use of this information.

## 2019-05-03 NOTE — PROGRESS NOTES
[address] [facility]  [address] [facility] [address]  Dept: [phone]  Dept Fax: [phone]  Loc: [phone]    Jhonathan Menjivar is a 60 y.o. female who presents today for her medical conditions/complaints as noted below. Jhonathan Menjivar is c/o of [employer] (pt was recently dx with mono. pt is still having left upper quadrant pain. pt is still having extreme fatigue. pt is very emotional and is not sure why. )        HPI:     Chief Complaint   Patient presents with   [address]     pt was recently dx with mono. pt is still having left upper quadrant pain. pt is still having extreme fatigue. pt is very emotional and is not sure why. HPI    Presents the office today as she was recently diagnosed with mono and she is having left upper quadrant pain. She states that the left upper quadrant pain has improved from last week, but her fatigue is continuously getting worse. The rash has resolved completely. She states she feels so tired that she has a hard time getting through work. She is trying not to lift more than 10 pounds at work, this can be hard for her at times. She states she comes home and just once asleep, but she has a hard time cooking dinner and cleaning. States she just becomes emotionally how tired she hasn't start crying. She denies a sore throat. She states that she has been resting only for 6 hours a night. She had a hard time working because of how tired she is.     Past Medical History:   Diagnosis Date    Diabetes mellitus ([address]     Fractures     Hyperlipidemia     Hypertension     Obesity     Seasonal allergies     Type 2 diabetes mellitus without complication (HCC)       Past Surgical History:   Procedure Laterality Date    TONSILLECTOMY      TUBAL LIGATION  1983       Family History   Problem Relation Age of Onset    High Blood Pressure Mother     Cancer Mother        Social History     Tobacco Use    Smoking status: Never Smoker  Smokeless tobacco: Never Used   Substance Use Topics    Alcohol use: No         Current Outpatient Medications   Medication Sig Dispense Refill    omeprazole (PRILOSEC) 20 MG delayed release capsule Take 1 capsule by mouth every morning (before breakfast) 60 capsule 1    losartan (COZAAR) 25 MG tablet TAKE ONE TABLET BY MOUTH DAILY 30 tablet 5    pioglitazone (ACTOS) 30 MG tablet Take 1 tablet by mouth daily 30 tablet 3    simvastatin (ZOCOR) 40 MG tablet Take 1 tablet by mouth every evening 30 tablet 0    sitaGLIPtan-metformin (JANUMET)  MG per tablet Take 1 tablet by mouth 2 times daily (with meals) 180 tablet 2    fluticasone (VERAMYST) 27.5 MCG/SPRAY nasal spray 2 sprays by Nasal route daily      vitamin D (CHOLECALCIFEROL) 1000 UNIT TABS tablet Take 1 tablet by mouth daily 30 tablet 3    loratadine (CLARITIN) 10 MG tablet Take 1 tablet by mouth daily 90 tablet 3    Glucose Blood (BLOOD GLUCOSE TEST STRIPS) STRP Test daily. 100 strip 0    aspirin 81 MG tablet Take 81 mg by mouth daily       No current facility-administered medications for this visit. No Known Allergies    Subjective:      Review of Systems   Constitutional: Positive for fatigue. Negative for activity change, appetite change, chills, diaphoresis, fever and unexpected weight change. HENT: Negative. Negative for ear pain, rhinorrhea, sinus pressure, sneezing, sore throat and trouble swallowing. Eyes: Negative for photophobia, pain, discharge, redness, itching and visual disturbance. Respiratory: Negative. Negative for apnea, cough, choking, chest tightness, shortness of breath, wheezing and stridor. Cardiovascular: Negative for chest pain, palpitations and leg swelling. Gastrointestinal: Positive for abdominal pain. Negative for abdominal distention, anal bleeding, blood in stool, constipation, diarrhea, nausea, rectal pain and vomiting. Genitourinary: Negative.   Negative for decreased urine volume, difficulty urinating, dysuria, enuresis, flank pain, frequency, genital sores, hematuria and urgency. Musculoskeletal: Negative. Negative for arthralgias, back pain, gait problem, joint swelling, myalgias, neck pain and neck stiffness. Skin: Negative. Negative for color change, pallor, rash and wound. Allergic/Immunologic: Negative. Neurological: Negative. Negative for dizziness, facial asymmetry, weakness, light-headedness and headaches. Psychiatric/Behavioral: Negative for agitation, behavioral problems, confusion, decreased concentration, dysphoric mood, hallucinations, self-injury, sleep disturbance and suicidal ideas. The patient is not nervous/anxious and is not hyperactive. Objective:     Vitals:    05/03/19 1334   BP: 120/60   Site: Left Upper Arm   Position: Sitting   Cuff Size: Large Adult   Pulse: 88   Temp: 98.2 °F (36.8 °C)   SpO2: 98%   Weight: 280 lb (127 kg)   Height: 5' 3\" (1.6 m)     Wt Readings from Last 3 Encounters:   05/03/19 280 lb (127 kg)   04/23/19 277 lb 3.2 oz (125.7 kg)   04/22/19 280 lb (127 kg)     Temp Readings from Last 3 Encounters:   05/03/19 98.2 °F (36.8 °C)   04/22/19 98.4 °F (36.9 °C)   10/05/18 98.7 °F (37.1 °C)     BP Readings from Last 3 Encounters:   05/03/19 120/60   04/23/19 122/68   04/22/19 104/68     Pulse Readings from Last 3 Encounters:   05/03/19 88   04/22/19 97   02/15/19 84     Physical Exam   Constitutional: She is oriented to person, place, and time. She appears well-developed and well-nourished. No distress. HENT:   Head: Normocephalic and atraumatic. Right Ear: External ear normal.   Left Ear: External ear normal.   Nose: Nose normal.   Mouth/Throat: Oropharynx is clear and moist. No oropharyngeal exudate. Eyes: Conjunctivae and EOM are normal. Right eye exhibits no discharge. Left eye exhibits no discharge. No scleral icterus. Neck: Normal range of motion. Neck supple. No tracheal deviation present.    Cardiovascular: Normal rate, regular rhythm, normal heart sounds and intact distal pulses. Exam reveals no gallop and no friction rub. No murmur heard. Pulmonary/Chest: Effort normal and breath sounds normal. No stridor. No respiratory distress. She has no wheezes. She has no rales. She exhibits no tenderness. Abdominal: Soft. Bowel sounds are normal. She exhibits no distension and no mass. There is no hepatosplenomegaly, splenomegaly or hepatomegaly. There is tenderness in the left upper quadrant. There is no rebound and no guarding. No hernia. Musculoskeletal: Normal range of motion. She exhibits no edema, tenderness or deformity. Lymphadenopathy:     She has no cervical adenopathy. Neurological: She is alert and oriented to person, place, and time. She has normal reflexes. She displays normal reflexes. No cranial nerve deficit. She exhibits normal muscle tone. Coordination normal.   Skin: Skin is warm and dry. Capillary refill takes less than 2 seconds. No rash noted. She is not diaphoretic. No erythema. No pallor. Psychiatric: She has a normal mood and affect. Her behavior is normal. Judgment and thought content normal.   Nursing note and vitals reviewed. Hospital Outpatient Visit on 04/29/2019   Component Date Value Ref Range Status    H63D Hemochrom Mut 04/29/2019 Negative   Final    C282Y Hemochrom Mut 04/29/2019 Negative   Final    Hemochromatosis Gene 04/29/2019 See Note   Final    Comment: Indication for testing: Carrier screening or diagnostic testing for  hereditary hemochromatosis. Hemochromatosis Interpretive Results:  Negative WT:  C282Y: Negative  The patient is negative for the HFE C282Y mutation. H63D: Negative  The patient is negative for the HFE H63D mutation. S65C: Negative  The patient is negative for the HFE S65C mutation. Mutations in unidentified genes or other mutations in the HFE gene are  not  ruled out.     This result has been reviewed and approved by Arash Vasquez M.D.  Carolynn La INFORMATION: Hemochromatosis (HFE) 3 Mutations  CHARACTERISTICS: Disorder of iron metabolism resulting in excessive  iron  storage leading to increased skin pigmentation, arthritis,  hypogonadism,  diabetes mellitus, heart arrhythmias/failure, cirrhosis and liver  carcinoma. INCIDENCE: One in 300 individuals of Northern  descent; unknown  in  other ethnicities. INHERITANCE: Autosomal recessive. PENETRANCE: 5 percent of C282Y homozygotes, 1 percent of C282Y/H63D                             compound  heterozygotes and rare H63D homozygotes develop clinical symptoms. CAUSE: Two pathogenic HFE gene mutations on opposite chromosomes. MUTATIONS TESTED: p.C282Y (c.845G>A), p.H63D (c.187C>G), and p. S65C  (c.193A>T). CLINICAL SENSITIVITY: 85 percent of hereditary hemochromatosis in  Northern  Europeans is caused by C282Y homozygosity and 5 percent by C282Y/H63D  compound heterozygosity. METHODOLOGY: PCR and fluorescence monitoring. ANALYTICAL SENSITIVTY AND SPECIFICITY: 99 percent. LIMITATIONS: HFE mutations, other than those targeted, will not be  detected. Diagnostic errors can occur due to rare sequence variations. Test developed and characteristics determined by [facility]. See  Compliance Statement C: [employer]  Performed by [facility],  [place] [address], [address] [facility] [phone]  www. Mendez Benitez MD - Lab. Director      S65C Hemochrom Mut 04/29/2019 Negative   Final    HFE PCR Specimen 04/29/2019 Whole Blood   Final           Assessment & Plan: The following diagnoses and conditions are stable with no further orders unless indicated:  1. Postviral fatigue syndrome    2. Michael Lot virus infection    3. Left upper quadrant pain        Verito Sanchez was seen today for discuss labs. Educate her on the reasons why she most likely is having left upper quadrant pain including her fatty liver. If her symptoms worsen, I would recommend her going to the ER.  She denies having any right lower quadrant pain. Will give her two weeks off of work related to her fatigue from her mono virus. She is to continue with rest and not lifting anything more than 10 pounds. Recommend her eating more frequent meals throughout the day. Recommend not overexerting herself. She is to follow-up in one week late to her mono in her left upper quadrant pain. Diagnoses and all orders for this visit:    Postviral fatigue syndrome    Madison Barr virus infection    Left upper quadrant pain      Prior to Visit Medications    Medication Sig Taking? Authorizing Provider   omeprazole (PRILOSEC) 20 MG delayed release capsule Take 1 capsule by mouth every morning (before breakfast) Yes Nu Moore MD   losartan (COZAAR) 25 MG tablet TAKE ONE TABLET BY MOUTH DAILY Yes JAYLEN Rosario CNP   pioglitazone (ACTOS) 30 MG tablet Take 1 tablet by mouth daily Yes JAYLEN Rosario CNP   simvastatin (ZOCOR) 40 MG tablet Take 1 tablet by mouth every evening Yes JAYLEN Rosario CNP   sitaGLIPtan-metformin (JANUMET)  MG per tablet Take 1 tablet by mouth 2 times daily (with meals) Yes JAYLEN Rosario CNP   fluticasone (VERAMYST) 27.5 MCG/SPRAY nasal spray 2 sprays by Nasal route daily Yes Historical Provider, MD   vitamin D (CHOLECALCIFEROL) 1000 UNIT TABS tablet Take 1 tablet by mouth daily Yes JAYLEN Rosario CNP   loratadine (CLARITIN) 10 MG tablet Take 1 tablet by mouth daily Yes JAYLEN Reyes CNP   Glucose Blood (BLOOD GLUCOSE TEST STRIPS) STRP Test daily. Yes JAYLEN Martins CNP   aspirin 81 MG tablet Take 81 mg by mouth daily Yes Historical Provider, MD      No orders of the defined types were placed in this encounter. Return in about 1 week (around 5/10/2019), or if symptoms worsen or fail to improve, for Mono follow up .     Patient should call the office immediately with new or ongoing signs or symptoms or worsening, or proceedto the emergency room. No changes in past medical history, past surgical history, social history, or family history were noted during the patient encounter unless specifically listed above. All updates of past medicalhistory, past surgical history, social history, or family history were reviewed personally by me during the office visit. All problems listed in the assessment are stable unless noted otherwise. Medication profilereviewed personally by me during the office visit. Medication side effects and possible impairments from medications were discussed as applicable.     Call if pattern of symptoms change or persists for an extended time. This document was prepared by a combination of typing and transcription through a voice recognition software.

## 2019-05-06 ASSESSMENT — ENCOUNTER SYMPTOMS
SORE THROAT: 0
VOMITING: 0
RESPIRATORY NEGATIVE: 1
CHEST TIGHTNESS: 0
ABDOMINAL DISTENTION: 0
ALLERGIC/IMMUNOLOGIC NEGATIVE: 1
BLOOD IN STOOL: 0
SINUS PRESSURE: 0
ABDOMINAL PAIN: 1
STRIDOR: 0
CONSTIPATION: 0
COUGH: 0
APNEA: 0
BACK PAIN: 0
EYE PAIN: 0
EYE ITCHING: 0
EYE DISCHARGE: 0
ANAL BLEEDING: 0
RECTAL PAIN: 0
WHEEZING: 0
EYE REDNESS: 0
SHORTNESS OF BREATH: 0
TROUBLE SWALLOWING: 0
RHINORRHEA: 0
COLOR CHANGE: 0
NAUSEA: 0
PHOTOPHOBIA: 0
CHOKING: 0
DIARRHEA: 0

## 2019-05-17 ENCOUNTER — OFFICE VISIT (OUTPATIENT)
Dept: FAMILY MEDICINE CLINIC | Age: 59
End: 2019-05-17
Payer: COMMERCIAL

## 2019-05-17 VITALS
HEIGHT: 63 IN | HEART RATE: 94 BPM | OXYGEN SATURATION: 97 % | DIASTOLIC BLOOD PRESSURE: 78 MMHG | WEIGHT: 282 LBS | SYSTOLIC BLOOD PRESSURE: 132 MMHG | TEMPERATURE: 98 F | BODY MASS INDEX: 49.96 KG/M2

## 2019-05-17 DIAGNOSIS — G93.31 POSTVIRAL FATIGUE SYNDROME: ICD-10-CM

## 2019-05-17 DIAGNOSIS — R21 SKIN RASH: ICD-10-CM

## 2019-05-17 DIAGNOSIS — B27.90 EPSTEIN BARR VIRUS INFECTION: Primary | ICD-10-CM

## 2019-05-17 PROCEDURE — 99214 OFFICE O/P EST MOD 30 MIN: CPT | Performed by: NURSE PRACTITIONER

## 2019-05-17 PROCEDURE — 36415 COLL VENOUS BLD VENIPUNCTURE: CPT | Performed by: NURSE PRACTITIONER

## 2019-05-17 ASSESSMENT — ENCOUNTER SYMPTOMS
COLOR CHANGE: 0
BLOOD IN STOOL: 0
EYE DISCHARGE: 0
BACK PAIN: 0
RHINORRHEA: 0
SORE THROAT: 0
VOMITING: 0
RESPIRATORY NEGATIVE: 1
APNEA: 0
GASTROINTESTINAL NEGATIVE: 1
NAUSEA: 0
SHORTNESS OF BREATH: 0
CONSTIPATION: 0
EYE ITCHING: 0
WHEEZING: 0
CHOKING: 0
DIARRHEA: 0
COUGH: 0
PHOTOPHOBIA: 0
STRIDOR: 0
TROUBLE SWALLOWING: 0
ALLERGIC/IMMUNOLOGIC NEGATIVE: 1
SINUS PRESSURE: 0
CHEST TIGHTNESS: 0
EYE REDNESS: 0
ABDOMINAL PAIN: 0
EYE PAIN: 0

## 2019-05-17 NOTE — LETTER
[facility]  [address] [facility]  Phone: [phone]  Fax: [phone]    JAYLEN Javed CNP        May 17, 2019     Patient: Tapan Mojica   YOB: 1960   Date of Visit: 5/17/2019       To Whom it May Concern:    Kelli Brito was seen in my clinic on 5/17/2019. She may return to work on May 20th. .    If you have any questions or concerns, please don't hesitate to call.     Sincerely,           JAYLEN Javed - CNP

## 2019-05-17 NOTE — PROGRESS NOTES
[address] [facility]  [address] [facility] [address]  Dept: [phone]  Dept Fax: [phone]  Loc: [phone]    Kye Olmstead is a 60 y.o. female who presents today for her medical conditions/complaints as noted below. Kye Olmstead is c/o of Other (pt is here to follow up on mono. pt states her rash at times and is still feeling tired. pt states that luq has subsided)        HPI:     Chief Complaint   Patient presents with    Other     pt is here to follow up on mono. pt states her rash at times and is still feeling tired. pt states that luq has subsided       HPI    Ms. Dylon Ashford is here to discuss her mono. She states she is still getting the viral rash off and on that she thought was poison ivy. The rash only seems to appear when she is stressed. She is feeling fatigued, but feels like this has improved dramatically. She states her left upper quadrant pain that she was having has resolved. She would like a note to return back to work.      Past Medical History:   Diagnosis Date    Diabetes mellitus ([address]     Fractures     Hyperlipidemia     Hypertension     Obesity     Seasonal allergies     Type 2 diabetes mellitus without complication (HCC)       Past Surgical History:   Procedure Laterality Date    TONSILLECTOMY      TUBAL LIGATION  1983       Family History   Problem Relation Age of Onset    High Blood Pressure Mother     Cancer Mother        Social History     Tobacco Use    Smoking status: Never Smoker    Smokeless tobacco: Never Used   Substance Use Topics    Alcohol use: No         Current Outpatient Medications   Medication Sig Dispense Refill    losartan (COZAAR) 25 MG tablet TAKE ONE TABLET BY MOUTH DAILY 30 tablet 5    pioglitazone (ACTOS) 30 MG tablet Take 1 tablet by mouth daily 30 tablet 3    simvastatin (ZOCOR) 40 MG tablet Take 1 tablet by mouth every evening 30 tablet 0    sitaGLIPtan-metformin (JANUMET)  MG per tablet Take 1 tablet by mouth 2 times daily (with meals) 180 tablet 2    fluticasone (VERAMYST) 27.5 MCG/SPRAY nasal spray 2 sprays by Nasal route daily      vitamin D (CHOLECALCIFEROL) 1000 UNIT TABS tablet Take 1 tablet by mouth daily 30 tablet 3    loratadine (CLARITIN) 10 MG tablet Take 1 tablet by mouth daily 90 tablet 3    Glucose Blood (BLOOD GLUCOSE TEST STRIPS) STRP Test daily. 100 strip 0    aspirin 81 MG tablet Take 81 mg by mouth daily       No current facility-administered medications for this visit. No Known Allergies    Subjective:      Review of Systems   Constitutional: Positive for fatigue. Negative for activity change, appetite change, chills, diaphoresis, fever and unexpected weight change. HENT: Negative. Negative for ear pain, rhinorrhea, sinus pressure, sneezing, sore throat and trouble swallowing. Eyes: Negative for photophobia, pain, discharge, redness, itching and visual disturbance. Respiratory: Negative. Negative for apnea, cough, choking, chest tightness, shortness of breath, wheezing and stridor. Cardiovascular: Negative for chest pain, palpitations and leg swelling. Gastrointestinal: Negative. Negative for abdominal pain, blood in stool, constipation, diarrhea, nausea and vomiting. Genitourinary: Negative. Negative for decreased urine volume, difficulty urinating, dysuria, enuresis, flank pain, frequency, genital sores, hematuria and urgency. Musculoskeletal: Negative. Negative for arthralgias, back pain, gait problem, joint swelling, myalgias, neck pain and neck stiffness. Skin: Negative. Negative for color change, pallor, rash and wound. Allergic/Immunologic: Negative. Neurological: Negative for dizziness, facial asymmetry, weakness, light-headedness and headaches.    Psychiatric/Behavioral: Negative for agitation, behavioral problems, confusion, decreased concentration, dysphoric mood, hallucinations, self-injury, sleep disturbance and suicidal ideas. The patient is not nervous/anxious and is not hyperactive. Objective:     Vitals:    05/17/19 1043   BP: 132/78   Site: Right Upper Arm   Position: Sitting   Cuff Size: Large Adult   Pulse: 94   Temp: 98 °F (36.7 °C)   SpO2: 97%   Weight: 282 lb (127.9 kg)   Height: 5' 3\" (1.6 m)     Wt Readings from Last 3 Encounters:   05/17/19 282 lb (127.9 kg)   05/03/19 280 lb (127 kg)   04/23/19 277 lb 3.2 oz (125.7 kg)     Temp Readings from Last 3 Encounters:   05/17/19 98 °F (36.7 °C)   05/03/19 98.2 °F (36.8 °C)   04/22/19 98.4 °F (36.9 °C)     BP Readings from Last 3 Encounters:   05/17/19 132/78   05/03/19 120/60   04/23/19 122/68     Pulse Readings from Last 3 Encounters:   05/17/19 94   05/03/19 88   04/22/19 97     Physical Exam   Constitutional: She is oriented to person, place, and time. She appears well-developed and well-nourished. No distress. Obese    HENT:   Head: Normocephalic and atraumatic. Right Ear: External ear normal.   Left Ear: External ear normal.   Nose: Nose normal.   Mouth/Throat: Oropharynx is clear and moist. No oropharyngeal exudate. Eyes: Conjunctivae and EOM are normal. Right eye exhibits no discharge. Left eye exhibits no discharge. No scleral icterus. Neck: Normal range of motion. Neck supple. No tracheal deviation present. Cardiovascular: Normal rate, regular rhythm, normal heart sounds and intact distal pulses. Exam reveals no gallop and no friction rub. No murmur heard. Pulmonary/Chest: Effort normal and breath sounds normal. No stridor. No respiratory distress. She has no wheezes. She has no rales. She exhibits no tenderness. Abdominal: Soft. Bowel sounds are normal. She exhibits no distension and no mass. There is tenderness in the epigastric area. There is no rebound and no guarding. No hernia. Musculoskeletal: Normal range of motion. She exhibits no edema, tenderness or deformity. Lymphadenopathy:     She has no cervical adenopathy.    Neurological: She is alert and oriented to person, place, and time. She has normal reflexes. She displays normal reflexes. No cranial nerve deficit. She exhibits normal muscle tone. Coordination normal.   Skin: Skin is warm and dry. Capillary refill takes less than 2 seconds. No rash noted. She is not diaphoretic. No erythema. No pallor. Psychiatric: She has a normal mood and affect. Her behavior is normal. Judgment and thought content normal.   Nursing note and vitals reviewed. Hospital Outpatient Visit on 04/29/2019   Component Date Value Ref Range Status    H63D Hemochrom Mut 04/29/2019 Negative   Final    C282Y Hemochrom Mut 04/29/2019 Negative   Final    Hemochromatosis Gene 04/29/2019 See Note   Final    Comment: Indication for testing: Carrier screening or diagnostic testing for  hereditary hemochromatosis. Hemochromatosis Interpretive Results:  Negative WT:  C282Y: Negative  The patient is negative for the HFE C282Y mutation. H63D: Negative  The patient is negative for the HFE H63D mutation. S65C: Negative  The patient is negative for the HFE S65C mutation. Mutations in unidentified genes or other mutations in the HFE gene are  not  ruled out. This result has been reviewed and approved by Natalya Castaneda M.D.  BACKGROUND INFORMATION: Hemochromatosis (HFE) 3 Mutations  CHARACTERISTICS: Disorder of iron metabolism resulting in excessive  iron  storage leading to increased skin pigmentation, arthritis,  hypogonadism,  diabetes mellitus, heart arrhythmias/failure, cirrhosis and liver  carcinoma. INCIDENCE: One in 300 individuals of Northern  descent; unknown  in  other ethnicities. INHERITANCE: Autosomal recessive. PENETRANCE: 5 percent of C282Y homozygotes, 1 percent of C282Y/H63D                             compound  heterozygotes and rare H63D homozygotes develop clinical symptoms. CAUSE: Two pathogenic HFE gene mutations on opposite chromosomes.   MUTATIONS TESTED: p.C282Y (c.845G>A), p.H63D (c.187C>G), and p. S65C  (c.193A>T). CLINICAL SENSITIVITY: 85 percent of hereditary hemochromatosis in  Northern  Europeans is caused by C282Y homozygosity and 5 percent by C282Y/H63D  compound heterozygosity. METHODOLOGY: PCR and fluorescence monitoring. ANALYTICAL SENSITIVTY AND SPECIFICITY: 99 percent. LIMITATIONS: HFE mutations, other than those targeted, will not be  detected. Diagnostic errors can occur due to rare sequence variations. Test developed and characteristics determined by [facility]. See  Compliance Statement C: [employer]  Performed by [facility],  [address], [address] [facility] [phone]  www. Sam Stanford MD - Lab. Director      S65C Hemochrom Mut 04/29/2019 Negative   Final    HFE PCR Specimen 04/29/2019 Whole Blood   Final           Assessment & Plan: The following diagnoses and conditions are stable with no further orders unless indicated:  1. Saadia Foyer virus infection    2. Postviral fatigue syndrome    3. Skin rash        Glen Rodriguez was seen today for other. If labs are okay - she may return to work. Letter given today. She did have some epigastric abdominal pain - will check lipase and amylase. She denied heartburn. Diagnoses and all orders for this visit:    Saadia Foyer virus infection  -     CBC Auto Differential  -     Comprehensive Metabolic Panel  -     LIPASE  -     AMYLASE    Postviral fatigue syndrome  -     CBC Auto Differential  -     Comprehensive Metabolic Panel  -     LIPASE  -     AMYLASE    Skin rash      Prior to Visit Medications    Medication Sig Taking?  Authorizing Provider   losartan (COZAAR) 25 MG tablet TAKE ONE TABLET BY MOUTH DAILY Yes Ritta Gip, APRN - CNP   pioglitazone (ACTOS) 30 MG tablet Take 1 tablet by mouth daily Yes Ritta Gip, APRN - CNP   simvastatin (ZOCOR) 40 MG tablet Take 1 tablet by mouth every evening Yes Ritta Gip, APRN - CNP   sitaGLIPtan-metformin Mars Collado)  MG per tablet Take 1 tablet by mouth 2 times daily (with meals) Yes JAYLEN De Leon CNP   fluticasone (VERAMYST) 27.5 MCG/SPRAY nasal spray 2 sprays by Nasal route daily Yes Historical Provider, MD   vitamin D (CHOLECALCIFEROL) 1000 UNIT TABS tablet Take 1 tablet by mouth daily Yes JAYLEN De Leon CNP   loratadine (CLARITIN) 10 MG tablet Take 1 tablet by mouth daily Yes JAYLEN Avery CNP   Glucose Blood (BLOOD GLUCOSE TEST STRIPS) STRP Test daily. Yes JAYLEN Martins CNP   aspirin 81 MG tablet Take 81 mg by mouth daily Yes Historical Provider, MD      No orders of the defined types were placed in this encounter. Return if symptoms worsen or fail to improve. Patient should call the office immediately with new or ongoing signs or symptoms or worsening, or proceedto the emergency room. No changes in past medical history, past surgical history, social history, or family history were noted during the patient encounter unless specifically listed above. All updates of past medicalhistory, past surgical history, social history, or family history were reviewed personally by me during the office visit. All problems listed in the assessment are stable unless noted otherwise. Medication profilereviewed personally by me during the office visit. Medication side effects and possible impairments from medications were discussed as applicable.     Call if pattern of symptoms change or persists for an extended time. This document was prepared by a combination of typing and transcription through a voice recognition software.

## 2019-05-17 NOTE — PATIENT INSTRUCTIONS
(Advil, Motrin), or naproxen (Aleve) for a sore throat or headache or to lower a fever. Read and follow all instructions on the label. · Do not take two or more pain medicines at the same time unless the doctor told you to. Many pain medicines have acetaminophen, which is Tylenol. Too much acetaminophen (Tylenol) can be harmful. · Do not play contact sports for 4 weeks. Do not lift anything heavy. Too much activity increases the chance that your spleen may break open. · Try not to spread the virus to others. Do not kiss or share dishes, glasses, eating utensils, or toothbrushes for at least two weeks. The virus is spread when saliva from an infected person gets in another person's mouth. It is hard to know how long you may be contagious. · If you know you have mono, do not donate blood. There is a chance of spreading the virus through blood products. When should you call for help? Call 911 anytime you think you may need emergency care. For example, call if:    · You passed out (lost consciousness).    Call your doctor now or seek immediate medical care if:    · You have new or worse belly pain.     · You have signs of needing more fluids. You have sunken eyes and a dry mouth, and you pass only a little urine.     · You are dizzy or lightheaded, or you feel like you may faint.     · You cannot swallow fluids.    Watch closely for changes in your health, and be sure to contact your doctor if:    · You do not get better as expected. Where can you learn more? Go to https://[place]60.[employer].CoverItLive. org and sign in to your [employer] account. Enter L333 in the IBeiFeng box to learn more about \"Mononucleosis: Care Instructions. \"     If you do not have an account, please click on the \"Sign Up Now\" link. Current as of: July 30, 2018  Content Version: 12.0  © 5202-9207 Healthwise, Incorporated. Care instructions adapted under license by [facility] ([facility]).  If you have questions about a medical condition

## 2019-05-18 LAB
A/G RATIO: 1.5 (ref 1.1–2.2)
ALBUMIN SERPL-MCNC: 4 G/DL (ref 3.4–5)
ALP BLD-CCNC: 85 U/L (ref 40–129)
ALT SERPL-CCNC: 21 U/L (ref 10–40)
AMYLASE: 48 U/L (ref 25–115)
ANION GAP SERPL CALCULATED.3IONS-SCNC: 11 MMOL/L (ref 3–16)
AST SERPL-CCNC: 25 U/L (ref 15–37)
BASOPHILS ABSOLUTE: 0.1 K/UL (ref 0–0.2)
BASOPHILS RELATIVE PERCENT: 0.7 %
BILIRUB SERPL-MCNC: 0.5 MG/DL (ref 0–1)
BUN BLDV-MCNC: 15 MG/DL (ref 7–20)
CALCIUM SERPL-MCNC: 9.8 MG/DL (ref 8.3–10.6)
CHLORIDE BLD-SCNC: 104 MMOL/L (ref 99–110)
CO2: 27 MMOL/L (ref 21–32)
CREAT SERPL-MCNC: 0.8 MG/DL (ref 0.6–1.1)
EOSINOPHILS ABSOLUTE: 0.8 K/UL (ref 0–0.6)
EOSINOPHILS RELATIVE PERCENT: 10 %
GFR AFRICAN AMERICAN: >60
GFR NON-AFRICAN AMERICAN: >60
GLOBULIN: 2.7 G/DL
GLUCOSE BLD-MCNC: 127 MG/DL (ref 70–99)
HCT VFR BLD CALC: 39.4 % (ref 36–48)
HEMOGLOBIN: 12.8 G/DL (ref 12–16)
LIPASE: 27 U/L (ref 13–60)
LYMPHOCYTES ABSOLUTE: 2.5 K/UL (ref 1–5.1)
LYMPHOCYTES RELATIVE PERCENT: 29.7 %
MCH RBC QN AUTO: 28.6 PG (ref 26–34)
MCHC RBC AUTO-ENTMCNC: 32.5 G/DL (ref 31–36)
MCV RBC AUTO: 88.1 FL (ref 80–100)
MONOCYTES ABSOLUTE: 0.7 K/UL (ref 0–1.3)
MONOCYTES RELATIVE PERCENT: 8.5 %
NEUTROPHILS ABSOLUTE: 4.2 K/UL (ref 1.7–7.7)
NEUTROPHILS RELATIVE PERCENT: 51.1 %
PDW BLD-RTO: 17.2 % (ref 12.4–15.4)
PLATELET # BLD: 233 K/UL (ref 135–450)
PLATELET SLIDE REVIEW: ABNORMAL
PMV BLD AUTO: 8.2 FL (ref 5–10.5)
POTASSIUM SERPL-SCNC: 4.6 MMOL/L (ref 3.5–5.1)
RBC # BLD: 4.47 M/UL (ref 4–5.2)
SLIDE REVIEW: ABNORMAL
SODIUM BLD-SCNC: 142 MMOL/L (ref 136–145)
TOTAL PROTEIN: 6.7 G/DL (ref 6.4–8.2)
WBC # BLD: 8.3 K/UL (ref 4–11)

## 2019-05-20 ENCOUNTER — TELEPHONE (OUTPATIENT)
Dept: FAMILY MEDICINE CLINIC | Age: 59
End: 2019-05-20

## 2019-05-20 NOTE — TELEPHONE ENCOUNTER
Pt called and stated that she's needing something sent to her work for the dates from 5/12-5/17(her last appt), due to the [employer] only dating to 5/12. Call back pt [phone].

## 2019-05-23 ENCOUNTER — TELEPHONE (OUTPATIENT)
Dept: FAMILY MEDICINE CLINIC | Age: 59
End: 2019-05-23

## 2019-06-03 ENCOUNTER — TELEPHONE (OUTPATIENT)
Dept: FAMILY MEDICINE CLINIC | Age: 59
End: 2019-06-03

## 2019-06-03 DIAGNOSIS — Z91.09 ENVIRONMENTAL ALLERGIES: Primary | ICD-10-CM

## 2019-06-03 RX ORDER — FLUTICASONE PROPIONATE 50 MCG
1 SPRAY, SUSPENSION (ML) NASAL DAILY
Qty: 1 BOTTLE | Refills: 3 | Status: SHIPPED | OUTPATIENT
Start: 2019-06-03 | End: 2020-04-10 | Stop reason: SDUPTHER

## 2019-06-03 NOTE — TELEPHONE ENCOUNTER
Pt called requesting Rx for Flonase be sent to [facility].  Call back [phone]

## 2019-06-10 DIAGNOSIS — E78.00 PURE HYPERCHOLESTEROLEMIA: ICD-10-CM

## 2019-06-10 DIAGNOSIS — E11.9 TYPE 2 DIABETES MELLITUS WITHOUT COMPLICATION, WITHOUT LONG-TERM CURRENT USE OF INSULIN (HCC): ICD-10-CM

## 2019-06-10 RX ORDER — PIOGLITAZONEHYDROCHLORIDE 30 MG/1
TABLET ORAL
Qty: 30 TABLET | Refills: 2 | Status: SHIPPED | OUTPATIENT
Start: 2019-06-10 | End: 2019-09-27 | Stop reason: ALTCHOICE

## 2019-06-10 RX ORDER — SIMVASTATIN 40 MG
TABLET ORAL
Qty: 30 TABLET | Refills: 2 | Status: SHIPPED | OUTPATIENT
Start: 2019-06-10 | End: 2019-09-21 | Stop reason: SDUPTHER

## 2019-07-16 DIAGNOSIS — E11.9 TYPE 2 DIABETES MELLITUS WITHOUT COMPLICATION, WITHOUT LONG-TERM CURRENT USE OF INSULIN (HCC): ICD-10-CM

## 2019-07-29 ENCOUNTER — TELEPHONE (OUTPATIENT)
Dept: FAMILY MEDICINE CLINIC | Age: 59
End: 2019-07-29

## 2019-07-29 NOTE — TELEPHONE ENCOUNTER
Pt called following up on PA for Janumet. As of 7/22 PA was REJECTED. Pt would like to try something else. Pt uses [facility]. Pt also requesting something for poison ivy. 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    Call back pt [phone]

## 2019-08-01 NOTE — TELEPHONE ENCOUNTER
Pt called requesting to try Metformin due to insurance not covering the [place]. Pt uses [employer].  Call back [phone]

## 2019-09-21 DIAGNOSIS — E78.00 PURE HYPERCHOLESTEROLEMIA: ICD-10-CM

## 2019-09-24 RX ORDER — SIMVASTATIN 40 MG
TABLET ORAL
Qty: 30 TABLET | Refills: 1 | Status: SHIPPED | OUTPATIENT
Start: 2019-09-24 | End: 2020-01-06

## 2019-09-27 ENCOUNTER — OFFICE VISIT (OUTPATIENT)
Dept: FAMILY MEDICINE CLINIC | Age: 59
End: 2019-09-27
Payer: COMMERCIAL

## 2019-09-27 VITALS
SYSTOLIC BLOOD PRESSURE: 132 MMHG | WEIGHT: 285 LBS | DIASTOLIC BLOOD PRESSURE: 80 MMHG | HEART RATE: 87 BPM | BODY MASS INDEX: 50.5 KG/M2 | OXYGEN SATURATION: 95 % | HEIGHT: 63 IN

## 2019-09-27 DIAGNOSIS — I10 ESSENTIAL HYPERTENSION: ICD-10-CM

## 2019-09-27 DIAGNOSIS — Z12.11 SCREEN FOR COLON CANCER: ICD-10-CM

## 2019-09-27 DIAGNOSIS — E55.9 VITAMIN D DEFICIENCY: ICD-10-CM

## 2019-09-27 DIAGNOSIS — E11.9 TYPE 2 DIABETES MELLITUS WITHOUT COMPLICATION, WITHOUT LONG-TERM CURRENT USE OF INSULIN (HCC): Primary | ICD-10-CM

## 2019-09-27 DIAGNOSIS — E78.2 MIXED HYPERLIPIDEMIA: ICD-10-CM

## 2019-09-27 LAB
BILIRUBIN, POC: NORMAL
BLOOD URINE, POC: NORMAL
CLARITY, POC: NORMAL
COLOR, POC: NORMAL
CREATININE URINE: 106.2 MG/DL (ref 28–259)
GLUCOSE URINE, POC: NORMAL
KETONES, POC: NORMAL
LEUKOCYTE EST, POC: NORMAL
MICROALBUMIN UR-MCNC: <1.2 MG/DL
MICROALBUMIN/CREAT UR-RTO: NORMAL MG/G (ref 0–30)
NITRITE, POC: NORMAL
PH, POC: 5.5
PROTEIN, POC: NORMAL
SPECIFIC GRAVITY, POC: 1.02
UROBILINOGEN, POC: 1

## 2019-09-27 PROCEDURE — 99215 OFFICE O/P EST HI 40 MIN: CPT | Performed by: NURSE PRACTITIONER

## 2019-09-27 PROCEDURE — 81002 URINALYSIS NONAUTO W/O SCOPE: CPT | Performed by: NURSE PRACTITIONER

## 2019-09-27 RX ORDER — GLIPIZIDE 10 MG/1
10 TABLET ORAL
Qty: 60 TABLET | Refills: 5 | Status: SHIPPED | OUTPATIENT
Start: 2019-09-27 | End: 2020-05-01

## 2019-09-27 RX ORDER — PIOGLITAZONEHYDROCHLORIDE 30 MG/1
TABLET ORAL
Qty: 30 TABLET | Refills: 5 | Status: SHIPPED | OUTPATIENT
Start: 2019-09-27 | End: 2019-12-05

## 2019-09-27 ASSESSMENT — ENCOUNTER SYMPTOMS
WHEEZING: 0
BLOOD IN STOOL: 0
SHORTNESS OF BREATH: 0
PHOTOPHOBIA: 0
VOMITING: 0
SORE THROAT: 0
CHEST TIGHTNESS: 0
RESPIRATORY NEGATIVE: 1
EYE REDNESS: 0
COLOR CHANGE: 0
ABDOMINAL PAIN: 0
BACK PAIN: 0
TROUBLE SWALLOWING: 0
EYE ITCHING: 0
APNEA: 0
COUGH: 0
SINUS PRESSURE: 0
CHOKING: 0
EYE PAIN: 0
CONSTIPATION: 0
RHINORRHEA: 0
DIARRHEA: 0
EYE DISCHARGE: 0
NAUSEA: 0
STRIDOR: 0
GASTROINTESTINAL NEGATIVE: 1

## 2019-09-27 NOTE — PATIENT INSTRUCTIONS
Simvastatin may be changed to Atorvastatin if this is cheaper     Pioglitazone is generic Actos - see if this is cheaper with your insurance at [facility] and the cash price     Glipizide is the new medication you are going to start. Do not take this if you are not eating.   You must take this with a meal.

## 2019-09-27 NOTE — PROGRESS NOTES
disturbance. Respiratory: Negative. Negative for apnea, cough, choking, chest tightness, shortness of breath, wheezing and stridor. Cardiovascular: Negative. Negative for chest pain, palpitations and leg swelling. Gastrointestinal: Negative. Negative for abdominal pain, blood in stool, constipation, diarrhea, nausea and vomiting. Genitourinary: Negative. Negative for decreased urine volume, difficulty urinating, dysuria, enuresis, flank pain, frequency, genital sores, hematuria and urgency. Musculoskeletal: Negative. Negative for arthralgias, back pain, gait problem, joint swelling, myalgias, neck pain and neck stiffness. Skin: Negative. Negative for color change, pallor, rash and wound. Allergic/Immunologic: Positive for environmental allergies. Negative for food allergies and immunocompromised state. Neurological: Negative. Negative for dizziness, tremors, seizures, syncope, facial asymmetry, speech difficulty, weakness, light-headedness, numbness and headaches. Psychiatric/Behavioral: Negative. Negative for agitation, behavioral problems, confusion, decreased concentration, dysphoric mood, hallucinations, self-injury, sleep disturbance and suicidal ideas. The patient is not nervous/anxious and is not hyperactive.           Objective:     Vitals:    09/27/19 0807   BP: 132/80   Site: Right Upper Arm   Position: Sitting   Cuff Size: Large Adult   Pulse: 87   SpO2: 95%   Weight: 285 lb (129.3 kg)   Height: 5' 3\" (1.6 m)     Wt Readings from Last 3 Encounters:   09/27/19 285 lb (129.3 kg)   05/17/19 282 lb (127.9 kg)   05/03/19 280 lb (127 kg)     Temp Readings from Last 3 Encounters:   05/17/19 98 °F (36.7 °C)   05/03/19 98.2 °F (36.8 °C)   04/22/19 98.4 °F (36.9 °C)     BP Readings from Last 3 Encounters:   09/27/19 132/80   05/17/19 132/78   05/03/19 120/60     Pulse Readings from Last 3 Encounters:   09/27/19 87   05/17/19 94   05/03/19 88     Physical Exam   Constitutional: She is  05/17/2019 >60  >60 Final    Comment: Chronic Kidney Disease: less than 60 ml/min/1.73 sq.m. Kidney Failure: less than 15 ml/min/1.73 sq.m. Results valid for patients 18 years and older.  Calcium 05/17/2019 9.8  8.3 - 10.6 mg/dL Final    Total Protein 05/17/2019 6.7  6.4 - 8.2 g/dL Final    Alb 05/17/2019 4.0  3.4 - 5.0 g/dL Final    Albumin/Globulin Ratio 05/17/2019 1.5  1.1 - 2.2 Final    Total Bilirubin 05/17/2019 0.5  0.0 - 1.0 mg/dL Final    Alkaline Phosphatase 05/17/2019 85  40 - 129 U/L Final    ALT 05/17/2019 21  10 - 40 U/L Final    AST 05/17/2019 25  15 - 37 U/L Final    Globulin 05/17/2019 2.7  g/dL Final    Lipase 05/17/2019 27.0  13.0 - 60.0 U/L Final    Amylase 05/17/2019 48  25 - 115 U/L Final           Assessment & Plan: The following diagnoses and conditions are stable with no further orders unless indicated:  1. Type 2 diabetes mellitus without complication, without long-term current use of insulin ([facility] [address]    2. Essential hypertension    3. Mixed hyperlipidemia    4. Vitamin D deficiency    5. Screen for colon cancer        Amanda Ndiaye was seen today for diabetes, hypertension and hyperlipidemia. Blood pressure stable. No changes in BP medications. A1C 10.2. She is unable to afford her medications and she will not have insurance at all in January. She states [facility] will not accept her Janumet coupon and she does not know what happened with the Actos. Actos sent into the pharmacy and Glipizide. She is to call the office if Actos is too expensive. She is doing well on the statin. She is to continue with the statin. Will check lipid at her follow up appt. Diagnoses and all orders for this visit:    Type 2 diabetes mellitus without complication, without long-term current use of insulin (HCC)  -     POCT Urinalysis no Micro  -     Microalbumin / Creatinine Urine Ratio  -     glipiZIDE (GLUCOTROL) 10 MG tablet;  Take 1 tablet by mouth 2 times

## 2019-10-25 DIAGNOSIS — I10 ESSENTIAL HYPERTENSION: ICD-10-CM

## 2019-10-25 RX ORDER — LOSARTAN POTASSIUM 25 MG/1
TABLET ORAL
Qty: 30 TABLET | Refills: 4 | Status: SHIPPED | OUTPATIENT
Start: 2019-10-25 | End: 2020-05-01

## 2019-12-05 ENCOUNTER — OFFICE VISIT (OUTPATIENT)
Dept: FAMILY MEDICINE CLINIC | Age: 59
End: 2019-12-05
Payer: COMMERCIAL

## 2019-12-05 VITALS
DIASTOLIC BLOOD PRESSURE: 70 MMHG | WEIGHT: 286 LBS | HEIGHT: 63 IN | SYSTOLIC BLOOD PRESSURE: 124 MMHG | HEART RATE: 97 BPM | OXYGEN SATURATION: 98 % | BODY MASS INDEX: 50.68 KG/M2

## 2019-12-05 DIAGNOSIS — E78.00 PURE HYPERCHOLESTEROLEMIA: ICD-10-CM

## 2019-12-05 DIAGNOSIS — M79.661 PAIN IN RIGHT LOWER LEG: Primary | ICD-10-CM

## 2019-12-05 DIAGNOSIS — I10 ESSENTIAL HYPERTENSION: ICD-10-CM

## 2019-12-05 DIAGNOSIS — E11.9 TYPE 2 DIABETES MELLITUS WITHOUT COMPLICATION, WITHOUT LONG-TERM CURRENT USE OF INSULIN (HCC): ICD-10-CM

## 2019-12-05 DIAGNOSIS — R53.83 FATIGUE, UNSPECIFIED TYPE: ICD-10-CM

## 2019-12-05 DIAGNOSIS — E55.9 VITAMIN D DEFICIENCY: ICD-10-CM

## 2019-12-05 LAB
BASOPHILS ABSOLUTE: 0.1 K/UL (ref 0–0.2)
BASOPHILS RELATIVE PERCENT: 1.1 %
BILIRUBIN, POC: NORMAL
BLOOD URINE, POC: NORMAL
CLARITY, POC: NORMAL
COLOR, POC: NORMAL
EOSINOPHILS ABSOLUTE: 0.3 K/UL (ref 0–0.6)
EOSINOPHILS RELATIVE PERCENT: 3.5 %
GLUCOSE URINE, POC: NORMAL
HCT VFR BLD CALC: 42.8 % (ref 36–48)
HEMOGLOBIN: 14.1 G/DL (ref 12–16)
KETONES, POC: NORMAL
LEUKOCYTE EST, POC: NORMAL
LYMPHOCYTES ABSOLUTE: 3.3 K/UL (ref 1–5.1)
LYMPHOCYTES RELATIVE PERCENT: 34.5 %
MCH RBC QN AUTO: 28.7 PG (ref 26–34)
MCHC RBC AUTO-ENTMCNC: 32.8 G/DL (ref 31–36)
MCV RBC AUTO: 87.4 FL (ref 80–100)
MONOCYTES ABSOLUTE: 0.7 K/UL (ref 0–1.3)
MONOCYTES RELATIVE PERCENT: 7.2 %
NEUTROPHILS ABSOLUTE: 5.1 K/UL (ref 1.7–7.7)
NEUTROPHILS RELATIVE PERCENT: 53.7 %
NITRITE, POC: NORMAL
PDW BLD-RTO: 14.3 % (ref 12.4–15.4)
PH, POC: 5
PLATELET # BLD: 448 K/UL (ref 135–450)
PMV BLD AUTO: 8.2 FL (ref 5–10.5)
PROTEIN, POC: NORMAL
RBC # BLD: 4.9 M/UL (ref 4–5.2)
SPECIFIC GRAVITY, POC: 1.02
UROBILINOGEN, POC: 0.2
WBC # BLD: 9.6 K/UL (ref 4–11)

## 2019-12-05 PROCEDURE — 93000 ELECTROCARDIOGRAM COMPLETE: CPT | Performed by: NURSE PRACTITIONER

## 2019-12-05 PROCEDURE — 36415 COLL VENOUS BLD VENIPUNCTURE: CPT | Performed by: NURSE PRACTITIONER

## 2019-12-05 PROCEDURE — 81002 URINALYSIS NONAUTO W/O SCOPE: CPT | Performed by: NURSE PRACTITIONER

## 2019-12-05 PROCEDURE — 99214 OFFICE O/P EST MOD 30 MIN: CPT | Performed by: NURSE PRACTITIONER

## 2019-12-05 ASSESSMENT — ENCOUNTER SYMPTOMS
VISUAL CHANGE: 0
APNEA: 0
CHEST TIGHTNESS: 0
CHANGE IN BOWEL HABIT: 0
RESPIRATORY NEGATIVE: 1
SORE THROAT: 0
SWOLLEN GLANDS: 0
BACK PAIN: 0
ABDOMINAL PAIN: 0
STRIDOR: 0
CHOKING: 0
COUGH: 0
VOMITING: 0
NAUSEA: 0
SHORTNESS OF BREATH: 0
GASTROINTESTINAL NEGATIVE: 1
WHEEZING: 0

## 2019-12-06 DIAGNOSIS — R53.83 FATIGUE, UNSPECIFIED TYPE: Primary | ICD-10-CM

## 2019-12-06 DIAGNOSIS — E11.9 TYPE 2 DIABETES MELLITUS WITHOUT COMPLICATION, WITHOUT LONG-TERM CURRENT USE OF INSULIN (HCC): Primary | ICD-10-CM

## 2019-12-06 DIAGNOSIS — R53.83 FATIGUE, UNSPECIFIED TYPE: ICD-10-CM

## 2019-12-06 DIAGNOSIS — E53.8 FOLIC ACID DEFICIENCY: Primary | ICD-10-CM

## 2019-12-06 LAB
A/G RATIO: 1.5 (ref 1.1–2.2)
ALBUMIN SERPL-MCNC: 4.3 G/DL (ref 3.4–5)
ALP BLD-CCNC: 97 U/L (ref 40–129)
ALT SERPL-CCNC: 17 U/L (ref 10–40)
ANION GAP SERPL CALCULATED.3IONS-SCNC: 18 MMOL/L (ref 3–16)
AST SERPL-CCNC: 28 U/L (ref 15–37)
BILIRUB SERPL-MCNC: 0.6 MG/DL (ref 0–1)
BUN BLDV-MCNC: 14 MG/DL (ref 7–20)
CALCIUM SERPL-MCNC: 10.1 MG/DL (ref 8.3–10.6)
CHLORIDE BLD-SCNC: 100 MMOL/L (ref 99–110)
CO2: 23 MMOL/L (ref 21–32)
CREAT SERPL-MCNC: 0.8 MG/DL (ref 0.6–1.2)
ESTIMATED AVERAGE GLUCOSE: 182.9 MG/DL
FOLATE: 4.48 NG/ML (ref 4.78–24.2)
GFR AFRICAN AMERICAN: >60
GFR NON-AFRICAN AMERICAN: >60
GLOBULIN: 2.8 G/DL
GLUCOSE BLD-MCNC: 176 MG/DL (ref 70–99)
HBA1C MFR BLD: 8 %
POTASSIUM SERPL-SCNC: 4.4 MMOL/L (ref 3.5–5.1)
SODIUM BLD-SCNC: 141 MMOL/L (ref 136–145)
TOTAL PROTEIN: 7.1 G/DL (ref 6.4–8.2)
TSH REFLEX: 1.33 UIU/ML (ref 0.27–4.2)
VITAMIN B-12: 348 PG/ML (ref 211–911)
VITAMIN D 25-HYDROXY: 35.6 NG/ML

## 2019-12-06 RX ORDER — FOLIC ACID 1 MG/1
1 TABLET ORAL DAILY
Qty: 30 TABLET | Refills: 5 | Status: SHIPPED | OUTPATIENT
Start: 2019-12-06 | End: 2020-10-08 | Stop reason: ALTCHOICE

## 2019-12-07 LAB — ANTI-NUCLEAR ANTIBODY (ANA): NEGATIVE

## 2019-12-24 ENCOUNTER — HOSPITAL ENCOUNTER (OUTPATIENT)
Dept: VASCULAR LAB | Age: 59
Discharge: HOME | End: 2019-12-24
Payer: COMMERCIAL

## 2019-12-24 DIAGNOSIS — M79.661 PAIN IN RIGHT LOWER LEG: ICD-10-CM

## 2019-12-24 PROCEDURE — 93971 EXTREMITY STUDY: CPT

## 2020-04-10 ENCOUNTER — TELEPHONE (OUTPATIENT)
Dept: FAMILY MEDICINE CLINIC | Age: 60
End: 2020-04-10

## 2020-04-10 RX ORDER — LORATADINE 10 MG/1
10 TABLET ORAL DAILY
Qty: 90 TABLET | Refills: 3 | Status: SHIPPED | OUTPATIENT
Start: 2020-04-10

## 2020-04-10 RX ORDER — FLUTICASONE PROPIONATE 50 MCG
1 SPRAY, SUSPENSION (ML) NASAL DAILY
Qty: 1 BOTTLE | Refills: 3 | Status: SHIPPED | OUTPATIENT
Start: 2020-04-10 | End: 2020-10-08 | Stop reason: SDUPTHER

## 2020-04-10 NOTE — TELEPHONE ENCOUNTER
Called pt and informed. She will call next week once she looks at her scheduled to make an appt for the POCT A1C.

## 2020-05-01 RX ORDER — LOSARTAN POTASSIUM 25 MG/1
TABLET ORAL
Qty: 30 TABLET | Refills: 3 | Status: SHIPPED | OUTPATIENT
Start: 2020-05-01 | End: 2020-10-08 | Stop reason: SDUPTHER

## 2020-10-08 ENCOUNTER — OFFICE VISIT (OUTPATIENT)
Dept: FAMILY MEDICINE CLINIC | Age: 60
End: 2020-10-08

## 2020-10-08 VITALS
HEART RATE: 83 BPM | BODY MASS INDEX: 49.61 KG/M2 | HEIGHT: 63 IN | OXYGEN SATURATION: 96 % | WEIGHT: 280 LBS | DIASTOLIC BLOOD PRESSURE: 70 MMHG | SYSTOLIC BLOOD PRESSURE: 130 MMHG

## 2020-10-08 LAB
CREATININE URINE: 146.1 MG/DL (ref 28–259)
MICROALBUMIN UR-MCNC: 3.7 MG/DL
MICROALBUMIN/CREAT UR-RTO: 25.3 MG/G (ref 0–30)

## 2020-10-08 PROCEDURE — 99213 OFFICE O/P EST LOW 20 MIN: CPT | Performed by: NURSE PRACTITIONER

## 2020-10-08 RX ORDER — LORATADINE 10 MG/1
10 TABLET ORAL DAILY
Qty: 90 TABLET | Refills: 3 | Status: CANCELLED | OUTPATIENT
Start: 2020-10-08

## 2020-10-08 RX ORDER — LOSARTAN POTASSIUM 25 MG/1
TABLET ORAL
Qty: 30 TABLET | Refills: 3 | Status: SHIPPED | OUTPATIENT
Start: 2020-10-08 | End: 2021-02-11

## 2020-10-08 RX ORDER — FLUTICASONE PROPIONATE 50 MCG
1 SPRAY, SUSPENSION (ML) NASAL DAILY
Qty: 1 BOTTLE | Refills: 3 | Status: SHIPPED | OUTPATIENT
Start: 2020-10-08

## 2020-10-08 RX ORDER — SIMVASTATIN 40 MG
TABLET ORAL
Qty: 30 TABLET | Refills: 5 | Status: SHIPPED | OUTPATIENT
Start: 2020-10-08 | End: 2021-08-03

## 2020-10-08 RX ORDER — CETIRIZINE HYDROCHLORIDE 10 MG/1
10 TABLET ORAL DAILY
Qty: 90 TABLET | Refills: 3 | Status: SHIPPED | OUTPATIENT
Start: 2020-10-08

## 2020-10-08 RX ORDER — GLIPIZIDE 10 MG/1
TABLET ORAL
Qty: 60 TABLET | Refills: 3 | Status: SHIPPED | OUTPATIENT
Start: 2020-10-08 | End: 2021-02-11

## 2020-10-08 ASSESSMENT — ENCOUNTER SYMPTOMS
TROUBLE SWALLOWING: 0
VOMITING: 0
CHOKING: 0
CONSTIPATION: 0
BACK PAIN: 1
STRIDOR: 0
APNEA: 0
COLOR CHANGE: 0
PHOTOPHOBIA: 0
CHEST TIGHTNESS: 0
RHINORRHEA: 0
COUGH: 0
EYE DISCHARGE: 0
EYE PAIN: 0
SINUS PRESSURE: 0
BLOOD IN STOOL: 0
DIARRHEA: 0
WHEEZING: 0
RESPIRATORY NEGATIVE: 1
NAUSEA: 0
EYE REDNESS: 0
GASTROINTESTINAL NEGATIVE: 1
EYE ITCHING: 0
SHORTNESS OF BREATH: 0
SORE THROAT: 0
ABDOMINAL PAIN: 0

## 2020-10-08 ASSESSMENT — PATIENT HEALTH QUESTIONNAIRE - PHQ9
SUM OF ALL RESPONSES TO PHQ QUESTIONS 1-9: 0
2. FEELING DOWN, DEPRESSED OR HOPELESS: 0
SUM OF ALL RESPONSES TO PHQ9 QUESTIONS 1 & 2: 0
SUM OF ALL RESPONSES TO PHQ QUESTIONS 1-9: 0
1. LITTLE INTEREST OR PLEASURE IN DOING THINGS: 0

## 2020-10-08 NOTE — PATIENT INSTRUCTIONS
need to take a lower than normal dose. Follow your doctor's instructions. You may take cetirizine with or without food. You must chew the chewable tablet before you swallow it. Do not swallow the dissolving tablet whole. Allow it to dissolve in your mouth without chewing. Swallow several times as the tablet dissolves. If desired, you may drink liquid to help swallow the dissolved tablet. Measure liquid medicine carefully. Use the dosing syringe provided, or use a medicine dose-measuring device (not a kitchen spoon). Call your doctor if your symptoms do not improve, if they get worse, or if you also have a fever. Store at room temperature away from moisture and heat. Do not allow liquid medicine to freeze. What happens if I miss a dose? Take the medicine as soon as you can, but skip the missed dose if it is almost time for your next dose. Do not take two doses at one time. What happens if I overdose? Seek emergency medical attention or call the Poison Help line at 1-875.336.9489. Overdose symptoms may include extreme drowsiness, vision problems, agitation, feeling restless and then drowsy or tired, fast heartbeats, stomach pain, nausea, vomiting, trouble walking, or trouble swallowing or speaking. What should I avoid while using cetirizine? Avoid driving or hazardous activity until you know how this medicine will affect you. Your reactions could be impaired. Avoid drinking alcohol while taking cetirizine. What are the possible side effects of cetirizine? Get emergency medical help if you have signs of an allergic reaction: hives; difficult breathing; swelling of your face, lips, tongue, or throat. Stop taking this medicine and call your doctor at once if you have:  · fast, pounding, or uneven heartbeat;  · weakness, tremors (uncontrolled shaking), or sleep problems (insomnia);  · severe restless feeling, hyperactivity;  · confusion;  · problems with vision; or  · little or no urination.   Common side effects may include:  · drowsiness, tiredness;  · dizziness, feeling light-headed;  · feeling hot, sweating;  · numbness, tingling, burning pain;  · decreased sense of taste;  · headache;  · upset stomach, nausea, constipation; or  · dry mouth, sore throat. This is not a complete list of side effects and others may occur. Call your doctor for medical advice about side effects. You may report side effects to FDA at 2-485-WEA-6166. What other drugs will affect cetirizine? Using cetirizine with other drugs that make you drowsy can worsen this effect. Ask your doctor before using opioid medication, a sleeping pill, a muscle relaxer, or medicine for anxiety or seizures. Other drugs may affect cetirizine, including prescription and over-the-counter medicines, vitamins, and herbal products. Tell your doctor about all your current medicines and any medicine you start or stop using. Where can I get more information? Your pharmacist can provide more information about cetirizine. Remember, keep this and all other medicines out of the reach of children, never share your medicines with others, and use this medication only for the indication prescribed. Every effort has been made to ensure that the information provided by [address] is accurate, up-to-date, and complete, but no guarantee is made to that effect. Drug information contained herein may be time sensitive. [employer] information has been compiled for use by healthcare practitioners and consumers in the [place] and therefore [employer] does not warrant that uses outside of the [place] are appropriate, unless specifically indicated otherwise. [employer] drug information does not endorse drugs, diagnose patients or recommend therapy.  [employer] drug information is an informational resource designed to assist licensed healthcare practitioners in caring for their patients and/or to serve consumers viewing this service as a supplement to, and not a substitute for, the expertise, skill, knowledge and judgment of healthcare practitioners. The absence of a warning for a given drug or drug combination in no way should be construed to indicate that the drug or drug combination is safe, effective or appropriate for any given patient. [facility] does not assume any responsibility for any aspect of healthcare administered with the aid of information [facility] provides. The information contained herein is not intended to cover all possible uses, directions, precautions, warnings, drug interactions, allergic reactions, or adverse effects. If you have questions about the drugs you are taking, check with your doctor, nurse or pharmacist.  Copyright 0272-1389 [address]: 12.01. Revision date: 5/6/2020. Care instructions adapted under license by [facility] ([facility]). If you have questions about a medical condition or this instruction, always ask your healthcare professional. [place] [address] any warranty or liability for your use of this information.

## 2020-10-08 NOTE — PROGRESS NOTES
[facility]  [facility]  [address] [place] [address]  [phone]  Dept: [phone]  Dept Fax: [phone]  Loc: [phone]    Denis Saha is a 63 y.o. female who presents today for her medical conditions/complaints as noted below. Denis Saha is c/o of Diabetes (pt does check sugar about 1-2 weekly. pt states it has been high since she ran out of her glipizide about 6 weeks ago. ) and Hypertension (pt does check bp at home and states it runs well. pt denies cp, sob, edema, or headaches. )    HPI:     Chief Complaint   Patient presents with    Diabetes     pt does check sugar about 1-2 weekly. pt states it has been high since she ran out of her glipizide about 6 weeks ago.  Hypertension     pt does check bp at home and states it runs well. pt denies cp, sob, edema, or headaches. HPI    Patient is here for follow up on diabetes. Taking medication as prescribed, except Glipizide for the last 6 weeks because she has been out of it. Denies any hypoglycemia episodes. Denies any increased urination, hunger, or thirst.  Trying to eat a healthy, diabetic diet. Patient is here today to follow up on hypertension. Taking medications as prescribed. Is trying to adhere to a no salt diet. Denies any chest pain, leg swelling, orthopnea, dizziness. Patient is here for hyperlipidemia follow up. Taking medication as prescribed. Denies any side effects to the medication. Trying to adhere to a low cholesterol diet. Denies any generalized myalgias. She is taking antihistamine and Flonase for her allergies. She feels like the medication is helping with her allergies.     Past Medical History:   Diagnosis Date    Diabetes mellitus ([address]     Fractures     Hyperlipidemia     Hypertension     Obesity     Seasonal allergies     Type 2 diabetes mellitus without complication ([facility] [address]       Past Surgical History:   Procedure Laterality Date    TONSILLECTOMY      TUBAL constipation, diarrhea, nausea and vomiting. Genitourinary: Negative. Negative for decreased urine volume, difficulty urinating, dysuria, enuresis, flank pain, frequency, genital sores, hematuria and urgency. Musculoskeletal: Positive for back pain (Chronic). Negative for arthralgias, gait problem, joint swelling, myalgias, neck pain and neck stiffness. Skin: Negative. Negative for color change, pallor, rash and wound. Allergic/Immunologic: Positive for environmental allergies. Negative for food allergies and immunocompromised state. Neurological: Negative. Negative for dizziness, facial asymmetry, weakness, light-headedness and headaches. Psychiatric/Behavioral: Negative for agitation, behavioral problems, confusion, decreased concentration, dysphoric mood, hallucinations, self-injury, sleep disturbance and suicidal ideas. The patient is not nervous/anxious and is not hyperactive. Objective:     Vitals:    10/08/20 0913   BP: 130/70   Site: Right Upper Arm   Position: Sitting   Cuff Size: Large Adult   Pulse: 83   SpO2: 96%   Weight: 280 lb (127 kg)   Height: 5' 3\" (1.6 m)     Wt Readings from Last 3 Encounters:   10/08/20 280 lb (127 kg)   12/05/19 286 lb (129.7 kg)   09/27/19 285 lb (129.3 kg)     Temp Readings from Last 3 Encounters:   05/17/19 98 °F (36.7 °C)   05/03/19 98.2 °F (36.8 °C)   04/22/19 98.4 °F (36.9 °C)     BP Readings from Last 3 Encounters:   10/08/20 130/70   12/05/19 124/70   09/27/19 132/80     Pulse Readings from Last 3 Encounters:   10/08/20 83   12/05/19 97   09/27/19 87     Physical Exam  Vitals signs and nursing note reviewed. Constitutional:       General: She is not in acute distress. Appearance: Normal appearance. She is well-developed. She is obese. She is not diaphoretic. HENT:      Head: Normocephalic and atraumatic. Right Ear: Tympanic membrane, ear canal and external ear normal. There is no impacted cerumen.       Left Ear: Tympanic membrane, ear canal and external ear normal. There is no impacted cerumen. Nose: Nose normal. No congestion or rhinorrhea. Mouth/Throat:      Mouth: Mucous membranes are moist.      Pharynx: Oropharynx is clear. No oropharyngeal exudate or posterior oropharyngeal erythema. Eyes:      General: No scleral icterus. Right eye: No discharge. Left eye: No discharge. Conjunctiva/sclera: Conjunctivae normal.   Neck:      Musculoskeletal: Normal range of motion and neck supple. No neck rigidity or muscular tenderness. Vascular: No carotid bruit. Trachea: No tracheal deviation. Cardiovascular:      Rate and Rhythm: Normal rate and regular rhythm. Pulses: Normal pulses. Heart sounds: Normal heart sounds. No murmur. No friction rub. No gallop. Pulmonary:      Effort: Pulmonary effort is normal. No respiratory distress. Breath sounds: Normal breath sounds. No stridor. No wheezing, rhonchi or rales. Chest:      Chest wall: No tenderness. Abdominal:      General: Bowel sounds are normal. There is no distension. Palpations: Abdomen is soft. There is no mass. Tenderness: There is no abdominal tenderness. There is no guarding or rebound. Hernia: No hernia is present. Musculoskeletal: Normal range of motion. General: No swelling, tenderness, deformity or signs of injury. Right lower leg: No edema. Left lower leg: No edema. Lymphadenopathy:      Cervical: No cervical adenopathy. Skin:     General: Skin is warm and dry. Capillary Refill: Capillary refill takes less than 2 seconds. Coloration: Skin is not jaundiced or pale. Findings: No bruising, erythema, lesion or rash. Neurological:      General: No focal deficit present. Mental Status: She is alert and oriented to person, place, and time. Mental status is at baseline. Cranial Nerves: No cranial nerve deficit. Sensory: No sensory deficit.       Motor: No weakness or abnormal muscle tone. Coordination: Coordination normal.      Gait: Gait normal.      Deep Tendon Reflexes: Reflexes are normal and symmetric. Reflexes normal.   Psychiatric:         Mood and Affect: Mood normal.         Behavior: Behavior normal.         Thought Content: Thought content normal.         Judgment: Judgment normal.         Orders Only on 12/06/2019   Component Date Value Ref Range Status    BUBBA 12/05/2019 Negative  Negative Final    Comment: BUBBA specimens are screened using multiplex bead immunoassay  methodology. All results reported as positive are further tested  by indirect fluorescent assay (IFA) using Hep-2 substrate with  an IgG-specific conjugate. The BUBBA screen is designed to detect  antibodies against dsDNA, SS-A (Ro), SS-B (La), Yang (Sm), SmRNP,  RNP, Scl-70, Juliana-1, Centromere, Chromatin, and Ribosomal P. Assessment & Plan: The following diagnoses and conditions are stable with no further orders unless indicated:  1. Type 2 diabetes mellitus without complication, without long-term current use of insulin ([facility] [address]    2. Essential hypertension    3. Mixed hyperlipidemia    4. Environmental allergies        Griselda Canton was seen today for diabetes and hypertension. Medications refilled. She is wanting to come back in 8 to 12 weeks to see what her A1C see at that time as she has been out of her glipizide. Future lab orders placed. BP stable. No changes to medication. Doing well with statin. No changes to medication.      Diagnoses and all orders for this visit:    Type 2 diabetes mellitus without complication, without long-term current use of insulin ([facility])  -      DIABETES FOOT EXAM  -     Microalbumin / Creatinine Urine Ratio  -     glipiZIDE (GLUCOTROL) 10 MG tablet; TAKE ONE TABLET BY MOUTH TWICE A DAY BEFORE MEALS  -     metFORMIN (GLUCOPHAGE) 500 MG tablet; TAKE TWO TABLETS BY MOUTH TWICE A DAY WITH MEALS  -     Hemoglobin A1C; Future    Essential hypertension  -     Comprehensive Metabolic Panel; Future  -     losartan (COZAAR) 25 MG tablet; TAKE ONE TABLET BY MOUTH DAILY    Mixed hyperlipidemia  -     Lipid Panel; Future  -     simvastatin (ZOCOR) 40 MG tablet; TAKE ONE TABLET BY MOUTH EVERY EVENING    Environmental allergies  -     fluticasone (FLONASE) 50 MCG/ACT nasal spray; 1 spray by Nasal route daily  -     cetirizine (ZYRTEC ALLERGY) 10 MG tablet; Take 1 tablet by mouth daily      Prior to Visit Medications    Medication Sig Taking? Authorizing Provider   fluticasone (FLONASE) 50 MCG/ACT nasal spray 1 spray by Nasal route daily Yes BayronJAYLEN Guerrero CNP   glipiZIDE (GLUCOTROL) 10 MG tablet TAKE ONE TABLET BY MOUTH TWICE A DAY BEFORE MEALS Yes JAYLEN Park CNP   losartan (COZAAR) 25 MG tablet TAKE ONE TABLET BY MOUTH DAILY Yes JAYLEN Park CNP   metFORMIN (GLUCOPHAGE) 500 MG tablet TAKE TWO TABLETS BY MOUTH TWICE A DAY WITH MEALS Yes JAYLEN Park CNP   simvastatin (ZOCOR) 40 MG tablet TAKE ONE TABLET BY MOUTH EVERY EVENING Yes JAYLEN Park CNP   cetirizine (ZYRTEC ALLERGY) 10 MG tablet Take 1 tablet by mouth daily Yes JAYLEN Park CNP   loratadine (CLARITIN) 10 MG tablet Take 1 tablet by mouth daily Yes JAYLEN Park CNP   vitamin D (CHOLECALCIFEROL) 1000 UNIT TABS tablet Take 1 tablet by mouth daily Yes JAYLEN Park CNP   Glucose Blood (BLOOD GLUCOSE TEST STRIPS) STRP Test daily.  Yes JAYLEN Martins CNP   aspirin 81 MG tablet Take 81 mg by mouth daily Yes Historical Provider, MD        Orders Placed This Encounter   Medications    fluticasone (FLONASE) 50 MCG/ACT nasal spray     Si spray by Nasal route daily     Dispense:  1 Bottle     Refill:  3    glipiZIDE (GLUCOTROL) 10 MG tablet     Sig: TAKE ONE TABLET BY MOUTH TWICE A DAY BEFORE MEALS     Dispense:  60 tablet     Refill:  3    losartan (COZAAR) 25 MG tablet

## 2020-10-10 LAB
ORGANISM: ABNORMAL
URINE CULTURE, ROUTINE: ABNORMAL

## 2020-10-12 RX ORDER — SULFAMETHOXAZOLE AND TRIMETHOPRIM 800; 160 MG/1; MG/1
1 TABLET ORAL 2 TIMES DAILY
Qty: 10 TABLET | Refills: 0 | Status: SHIPPED | OUTPATIENT
Start: 2020-10-12 | End: 2020-10-17

## 2021-01-04 DIAGNOSIS — E55.9 VITAMIN D DEFICIENCY: ICD-10-CM

## 2021-01-04 DIAGNOSIS — I10 ESSENTIAL HYPERTENSION: Primary | ICD-10-CM

## 2021-02-11 DIAGNOSIS — I10 ESSENTIAL HYPERTENSION: ICD-10-CM

## 2021-02-11 DIAGNOSIS — E11.9 TYPE 2 DIABETES MELLITUS WITHOUT COMPLICATION, WITHOUT LONG-TERM CURRENT USE OF INSULIN (HCC): ICD-10-CM

## 2021-02-11 RX ORDER — LOSARTAN POTASSIUM 25 MG/1
TABLET ORAL
Qty: 30 TABLET | Refills: 2 | Status: SHIPPED | OUTPATIENT
Start: 2021-02-11 | End: 2021-05-20

## 2021-02-11 RX ORDER — GLIPIZIDE 10 MG/1
TABLET ORAL
Qty: 60 TABLET | Refills: 2 | Status: SHIPPED | OUTPATIENT
Start: 2021-02-11 | End: 2021-04-29 | Stop reason: ALTCHOICE

## 2021-02-11 NOTE — TELEPHONE ENCOUNTER
Patient is past due for her lab work. She was going to come back to get these - please see future labs. Please have her schedule her lab work for further refills.

## 2021-03-04 ENCOUNTER — NURSE ONLY (OUTPATIENT)
Dept: FAMILY MEDICINE CLINIC | Age: 61
End: 2021-03-04
Payer: MEDICAID

## 2021-03-04 DIAGNOSIS — I10 ESSENTIAL HYPERTENSION: ICD-10-CM

## 2021-03-04 DIAGNOSIS — E78.2 MIXED HYPERLIPIDEMIA: ICD-10-CM

## 2021-03-04 DIAGNOSIS — E55.9 VITAMIN D DEFICIENCY: ICD-10-CM

## 2021-03-04 DIAGNOSIS — E11.9 TYPE 2 DIABETES MELLITUS WITHOUT COMPLICATION, WITHOUT LONG-TERM CURRENT USE OF INSULIN (HCC): ICD-10-CM

## 2021-03-04 LAB
A/G RATIO: 1.5 (ref 1.1–2.2)
ALBUMIN SERPL-MCNC: 4.3 G/DL (ref 3.4–5)
ALP BLD-CCNC: 102 U/L (ref 40–129)
ALT SERPL-CCNC: 17 U/L (ref 10–40)
ANION GAP SERPL CALCULATED.3IONS-SCNC: 16 MMOL/L (ref 3–16)
AST SERPL-CCNC: 26 U/L (ref 15–37)
BASOPHILS ABSOLUTE: 0.1 K/UL (ref 0–0.2)
BASOPHILS RELATIVE PERCENT: 1.2 %
BILIRUB SERPL-MCNC: 0.5 MG/DL (ref 0–1)
BUN BLDV-MCNC: 12 MG/DL (ref 7–20)
CALCIUM SERPL-MCNC: 10.1 MG/DL (ref 8.3–10.6)
CHLORIDE BLD-SCNC: 103 MMOL/L (ref 99–110)
CHOLESTEROL, TOTAL: 161 MG/DL (ref 0–199)
CO2: 25 MMOL/L (ref 21–32)
CREAT SERPL-MCNC: 0.8 MG/DL (ref 0.6–1.2)
EOSINOPHILS ABSOLUTE: 0.6 K/UL (ref 0–0.6)
EOSINOPHILS RELATIVE PERCENT: 6.1 %
GFR AFRICAN AMERICAN: >60
GFR NON-AFRICAN AMERICAN: >60
GLOBULIN: 2.9 G/DL
GLUCOSE BLD-MCNC: 159 MG/DL (ref 70–99)
HCT VFR BLD CALC: 42.3 % (ref 36–48)
HDLC SERPL-MCNC: 53 MG/DL (ref 40–60)
HEMOGLOBIN: 13.5 G/DL (ref 12–16)
LDL CHOLESTEROL CALCULATED: 80 MG/DL
LYMPHOCYTES ABSOLUTE: 3.6 K/UL (ref 1–5.1)
LYMPHOCYTES RELATIVE PERCENT: 37.1 %
MCH RBC QN AUTO: 27.4 PG (ref 26–34)
MCHC RBC AUTO-ENTMCNC: 31.8 G/DL (ref 31–36)
MCV RBC AUTO: 86.2 FL (ref 80–100)
MONOCYTES ABSOLUTE: 0.8 K/UL (ref 0–1.3)
MONOCYTES RELATIVE PERCENT: 8.2 %
NEUTROPHILS ABSOLUTE: 4.7 K/UL (ref 1.7–7.7)
NEUTROPHILS RELATIVE PERCENT: 47.4 %
PDW BLD-RTO: 15.1 % (ref 12.4–15.4)
PLATELET # BLD: 461 K/UL (ref 135–450)
PMV BLD AUTO: 8.5 FL (ref 5–10.5)
POTASSIUM SERPL-SCNC: 4.9 MMOL/L (ref 3.5–5.1)
RBC # BLD: 4.91 M/UL (ref 4–5.2)
SODIUM BLD-SCNC: 144 MMOL/L (ref 136–145)
TOTAL PROTEIN: 7.2 G/DL (ref 6.4–8.2)
TRIGL SERPL-MCNC: 138 MG/DL (ref 0–150)
VLDLC SERPL CALC-MCNC: 28 MG/DL
WBC # BLD: 9.8 K/UL (ref 4–11)

## 2021-03-04 PROCEDURE — 36415 COLL VENOUS BLD VENIPUNCTURE: CPT | Performed by: NURSE PRACTITIONER

## 2021-03-05 DIAGNOSIS — E11.9 TYPE 2 DIABETES MELLITUS WITHOUT COMPLICATION, WITHOUT LONG-TERM CURRENT USE OF INSULIN (HCC): Primary | ICD-10-CM

## 2021-03-05 LAB
ESTIMATED AVERAGE GLUCOSE: 220.2 MG/DL
HBA1C MFR BLD: 9.3 %
VITAMIN D 25-HYDROXY: 52.2 NG/ML

## 2021-03-05 RX ORDER — EMPAGLIFLOZIN 10 MG/1
1 TABLET, FILM COATED ORAL DAILY
Qty: 30 TABLET | Refills: 1 | Status: SHIPPED | OUTPATIENT
Start: 2021-03-05 | End: 2021-05-10

## 2021-03-24 ENCOUNTER — TELEPHONE (OUTPATIENT)
Dept: ADMINISTRATIVE | Age: 61
End: 2021-03-24

## 2021-03-30 NOTE — TELEPHONE ENCOUNTER
Received APPROVAL for Jardiance 10MG tablets. Medication has been approved through 03/29/2022. Please notify patient. Thank you.

## 2021-04-29 ENCOUNTER — OFFICE VISIT (OUTPATIENT)
Dept: FAMILY MEDICINE CLINIC | Age: 61
End: 2021-04-29
Payer: MEDICAID

## 2021-04-29 VITALS
HEART RATE: 87 BPM | SYSTOLIC BLOOD PRESSURE: 118 MMHG | BODY MASS INDEX: 49.08 KG/M2 | WEIGHT: 277 LBS | OXYGEN SATURATION: 98 % | DIASTOLIC BLOOD PRESSURE: 68 MMHG | HEIGHT: 63 IN

## 2021-04-29 DIAGNOSIS — E66.01 CLASS 3 SEVERE OBESITY DUE TO EXCESS CALORIES WITH SERIOUS COMORBIDITY AND BODY MASS INDEX (BMI) OF 45.0 TO 49.9 IN ADULT (HCC): ICD-10-CM

## 2021-04-29 DIAGNOSIS — I10 ESSENTIAL HYPERTENSION: ICD-10-CM

## 2021-04-29 DIAGNOSIS — E11.9 TYPE 2 DIABETES MELLITUS WITHOUT COMPLICATION, WITHOUT LONG-TERM CURRENT USE OF INSULIN (HCC): Primary | ICD-10-CM

## 2021-04-29 DIAGNOSIS — E78.2 MIXED HYPERLIPIDEMIA: ICD-10-CM

## 2021-04-29 PROCEDURE — 99214 OFFICE O/P EST MOD 30 MIN: CPT | Performed by: NURSE PRACTITIONER

## 2021-04-29 PROCEDURE — 2022F DILAT RTA XM EVC RTNOPTHY: CPT | Performed by: NURSE PRACTITIONER

## 2021-04-29 PROCEDURE — G8427 DOCREV CUR MEDS BY ELIG CLIN: HCPCS | Performed by: NURSE PRACTITIONER

## 2021-04-29 PROCEDURE — G8417 CALC BMI ABV UP PARAM F/U: HCPCS | Performed by: NURSE PRACTITIONER

## 2021-04-29 PROCEDURE — 1036F TOBACCO NON-USER: CPT | Performed by: NURSE PRACTITIONER

## 2021-04-29 PROCEDURE — 3017F COLORECTAL CA SCREEN DOC REV: CPT | Performed by: NURSE PRACTITIONER

## 2021-04-29 PROCEDURE — 3046F HEMOGLOBIN A1C LEVEL >9.0%: CPT | Performed by: NURSE PRACTITIONER

## 2021-04-29 RX ORDER — BLOOD-GLUCOSE METER
1 KIT MISCELLANEOUS DAILY
Qty: 1 KIT | Refills: 0 | Status: SHIPPED | OUTPATIENT
Start: 2021-04-29

## 2021-04-29 RX ORDER — GLIPIZIDE 5 MG/1
5 TABLET, FILM COATED, EXTENDED RELEASE ORAL DAILY
Qty: 90 TABLET | Refills: 0 | Status: SHIPPED | OUTPATIENT
Start: 2021-04-29 | End: 2021-08-03 | Stop reason: SDUPTHER

## 2021-04-29 ASSESSMENT — PATIENT HEALTH QUESTIONNAIRE - PHQ9
SUM OF ALL RESPONSES TO PHQ9 QUESTIONS 1 & 2: 0
SUM OF ALL RESPONSES TO PHQ QUESTIONS 1-9: 0
1. LITTLE INTEREST OR PLEASURE IN DOING THINGS: 0
SUM OF ALL RESPONSES TO PHQ QUESTIONS 1-9: 0
2. FEELING DOWN, DEPRESSED OR HOPELESS: 0

## 2021-04-29 NOTE — PROGRESS NOTES
[place]  PHYSICIAN PRACTICES  [facility]  [address] [place] [address]  [phone]  Dept: [phone]  Dept Fax: [phone]  Loc: [phone]    Bryant Reynolds is a 63 y.o. female who presents today for her medical conditions/complaints as noted below. Bryant Reynolds is c/o of Diabetes (pt started jardiance since the last appt and feels that her sugars are coming down. )        HPI:     Chief Complaint   Patient presents with    Diabetes     pt started jardiance since the last appt and feels that her sugars are coming down. HPI    Patient is here today to follow up on hypertension. Taking medications as prescribed. Is trying to adhere to a no salt diet. Denies any chest pain, leg swelling, orthopnea, dizziness. Patient is here for hyperlipidemia follow up. Taking medication as prescribed. Denies any side effects to the medication. Trying to adhere to a low cholesterol diet. Denies any generalized myalgias. Patient is here for follow up on diabetes. Taking medication as prescribed. Admits to having hypoglycemic episode last week down to 34 with fatigue, sweating and shaking when she was not eating much and was exercising outside. Denies any increased urination, hunger, or thirst.  Trying to eat a healthy, diabetic diet over the last 4 weeks. Glucose over the last 4 weeks have been less than 150 and majority around 110-130.      Past Medical History:   Diagnosis Date    Diabetes mellitus ([address]     Fractures     Hyperlipidemia     Hypertension     Obesity     Seasonal allergies     Type 2 diabetes mellitus without complication (HCC)       Past Surgical History:   Procedure Laterality Date    TONSILLECTOMY      TUBAL LIGATION  1983       Family History   Problem Relation Age of Onset    High Blood Pressure Mother     Cancer Mother        Social History     Tobacco Use    Smoking status: Never Smoker    Smokeless tobacco: Never Used   Substance Use Topics    Alcohol volume, difficulty urinating, dysuria, enuresis, flank pain, frequency, genital sores, hematuria and urgency. Musculoskeletal: Negative. Negative for arthralgias, back pain, gait problem, joint swelling, myalgias, neck pain and neck stiffness. Skin: Negative. Negative for color change, pallor, rash and wound. Allergic/Immunologic: Negative. Neurological: Negative. Negative for dizziness, facial asymmetry, weakness, light-headedness and headaches. Psychiatric/Behavioral: Negative for agitation, behavioral problems, confusion, decreased concentration, dysphoric mood, hallucinations, self-injury, sleep disturbance and suicidal ideas. The patient is not nervous/anxious and is not hyperactive. Objective:     Vitals:    04/29/21 1032   BP: 118/68   Site: Right Upper Arm   Position: Sitting   Cuff Size: Large Adult   Pulse: 87   SpO2: 98%   Weight: 277 lb (125.6 kg)   Height: 5' 3\" (1.6 m)     Wt Readings from Last 3 Encounters:   04/29/21 277 lb (125.6 kg)   10/08/20 280 lb (127 kg)   12/05/19 286 lb (129.7 kg)     Temp Readings from Last 3 Encounters:   05/17/19 98 °F (36.7 °C)   05/03/19 98.2 °F (36.8 °C)   04/22/19 98.4 °F (36.9 °C)     BP Readings from Last 3 Encounters:   04/29/21 118/68   10/08/20 130/70   12/05/19 124/70     Pulse Readings from Last 3 Encounters:   04/29/21 87   10/08/20 83   12/05/19 97     Physical Exam  Vitals signs and nursing note reviewed. Constitutional:       General: She is not in acute distress. Appearance: Normal appearance. She is well-developed. She is obese. She is not diaphoretic. HENT:      Head: Normocephalic and atraumatic. Right Ear: Tympanic membrane, ear canal and external ear normal. There is no impacted cerumen. Left Ear: Tympanic membrane, ear canal and external ear normal. There is no impacted cerumen. Nose: Nose normal. No congestion or rhinorrhea.       Mouth/Throat:      Mouth: Mucous membranes are moist.      Pharynx: Oropharynx is clear. No oropharyngeal exudate or posterior oropharyngeal erythema. Eyes:      General: No scleral icterus. Right eye: No discharge. Left eye: No discharge. Conjunctiva/sclera: Conjunctivae normal.   Neck:      Musculoskeletal: Normal range of motion and neck supple. No neck rigidity or muscular tenderness. Vascular: No carotid bruit. Trachea: No tracheal deviation. Cardiovascular:      Rate and Rhythm: Normal rate and regular rhythm. Pulses: Normal pulses. Heart sounds: Normal heart sounds. No murmur. No friction rub. No gallop. Pulmonary:      Effort: Pulmonary effort is normal. No respiratory distress. Breath sounds: Normal breath sounds. No stridor. No wheezing, rhonchi or rales. Chest:      Chest wall: No tenderness. Abdominal:      General: Bowel sounds are normal. There is no distension. Palpations: Abdomen is soft. There is no mass. Tenderness: There is no abdominal tenderness. There is no guarding or rebound. Hernia: No hernia is present. Musculoskeletal: Normal range of motion. General: No swelling, tenderness, deformity or signs of injury. Right lower leg: No edema. Left lower leg: No edema. Lymphadenopathy:      Cervical: No cervical adenopathy. Skin:     General: Skin is warm and dry. Capillary Refill: Capillary refill takes less than 2 seconds. Coloration: Skin is not jaundiced or pale. Findings: No bruising, erythema, lesion or rash. Neurological:      General: No focal deficit present. Mental Status: She is alert and oriented to person, place, and time. Mental status is at baseline. Cranial Nerves: No cranial nerve deficit. Sensory: No sensory deficit. Motor: No weakness or abnormal muscle tone. Coordination: Coordination normal.      Gait: Gait normal.      Deep Tendon Reflexes: Reflexes are normal and symmetric.  Reflexes normal.   Psychiatric: Mood and Affect: Mood normal.         Behavior: Behavior normal.         Thought Content: Thought content normal.         Judgment: Judgment normal.         Nurse Only on 03/04/2021   Component Date Value Ref Range Status    Hemoglobin A1C 03/04/2021 9.3  See comment % Final    Comment: Comment:  Diagnosis of Diabetes: > or = 6.5%  Increased risk of diabetes (Prediabetes): 5.7-6.4%  Glycemic Control: Nonpregnant Adults: <7.0%                    Pregnant: <6.0%        eAG 03/04/2021 220.2  mg/dL Final    WBC 03/04/2021 9.8  4.0 - 11.0 K/uL Final    RBC 03/04/2021 4.91  4.00 - 5.20 M/uL Final    Hemoglobin 03/04/2021 13.5  12.0 - 16.0 g/dL Final    Hematocrit 03/04/2021 42.3  36.0 - 48.0 % Final    MCV 03/04/2021 86.2  80.0 - 100.0 fL Final    MCH 03/04/2021 27.4  26.0 - 34.0 pg Final    MCHC 03/04/2021 31.8  31.0 - 36.0 g/dL Final    RDW 03/04/2021 15.1  12.4 - 15.4 % Final    Platelets 62/80/4668 461* 135 - 450 K/uL Final    MPV 03/04/2021 8.5  5.0 - 10.5 fL Final    Neutrophils % 03/04/2021 47.4  % Final    Lymphocytes % 03/04/2021 37.1  % Final    Monocytes % 03/04/2021 8.2  % Final    Eosinophils % 03/04/2021 6.1  % Final    Basophils % 03/04/2021 1.2  % Final    Neutrophils Absolute 03/04/2021 4.7  1.7 - 7.7 K/uL Final    Lymphocytes Absolute 03/04/2021 3.6  1.0 - 5.1 K/uL Final    Monocytes Absolute 03/04/2021 0.8  0.0 - 1.3 K/uL Final    Eosinophils Absolute 03/04/2021 0.6  0.0 - 0.6 K/uL Final    Basophils Absolute 03/04/2021 0.1  0.0 - 0.2 K/uL Final    Vit D, 25-Hydroxy 03/04/2021 52.2  >=30 ng/mL Final    Comment: <=20 ng/mL. ........... Nadyne [place] Deficient  21-29 ng/mL. ......... Nadyne [place] Insufficient  >=30 ng/mL. ........ Nadyne [place] Sufficient      Sodium 03/04/2021 144  136 - 145 mmol/L Final    Potassium 03/04/2021 4.9  3.5 - 5.1 mmol/L Final    Chloride 03/04/2021 103  99 - 110 mmol/L Final    CO2 03/04/2021 25  21 - 32 mmol/L Final    Anion Gap 03/04/2021 16  3 - 16 Final    Glucose 03/04/2021 159* 70 - 99 mg/dL Final    BUN 03/04/2021 12  7 - 20 mg/dL Final    CREATININE 03/04/2021 0.8  0.6 - 1.2 mg/dL Final    GFR Non- 03/04/2021 >60  >60 Final    Comment: >60 mL/min/1.73m2 EGFR, calc. for ages 25 and older using the  MDRD formula (not corrected for weight), is valid for stable  renal function.  GFR  03/04/2021 >60  >60 Final    Comment: Chronic Kidney Disease: less than 60 ml/min/1.73 sq.m. Kidney Failure: less than 15 ml/min/1.73 sq.m. Results valid for patients 18 years and older.  Calcium 03/04/2021 10.1  8.3 - 10.6 mg/dL Final    Total Protein 03/04/2021 7.2  6.4 - 8.2 g/dL Final    Albumin 03/04/2021 4.3  3.4 - 5.0 g/dL Final    Albumin/Globulin Ratio 03/04/2021 1.5  1.1 - 2.2 Final    Total Bilirubin 03/04/2021 0.5  0.0 - 1.0 mg/dL Final    Alkaline Phosphatase 03/04/2021 102  40 - 129 U/L Final    ALT 03/04/2021 17  10 - 40 U/L Final    AST 03/04/2021 26  15 - 37 U/L Final    Globulin 03/04/2021 2.9  g/dL Final    Cholesterol, Total 03/04/2021 161  0 - 199 mg/dL Final    Triglycerides 03/04/2021 138  0 - 150 mg/dL Final    HDL 03/04/2021 53  40 - 60 mg/dL Final    LDL Calculated 03/04/2021 80  <100 mg/dL Final    VLDL Cholesterol Calculated 03/04/2021 28  Not Established mg/dL Final           Assessment & Plan: The following diagnoses and conditions are stable with no further orders unless indicated:  1. Type 2 diabetes mellitus without complication, without long-term current use of insulin ([address]    2. Essential hypertension    3. Mixed hyperlipidemia    4. Class 3 severe obesity due to excess calories with serious comorbidity and body mass index (BMI) of 45.0 to 49.9 in adult [facility])        Kimberlee Ribeiro was seen today for diabetes. Since A1C was 9.3 of 03/2021, recommended her decrease concentrated sweets and starches and eat well-balanced meals. Recommended exercise and weight loss.  She has been improving diet and now is having

## 2021-05-04 DIAGNOSIS — E11.9 TYPE 2 DIABETES MELLITUS WITHOUT COMPLICATION, WITHOUT LONG-TERM CURRENT USE OF INSULIN (HCC): ICD-10-CM

## 2021-05-04 PROBLEM — E66.01 CLASS 3 SEVERE OBESITY DUE TO EXCESS CALORIES WITH SERIOUS COMORBIDITY AND BODY MASS INDEX (BMI) OF 45.0 TO 49.9 IN ADULT (HCC): Status: ACTIVE | Noted: 2021-05-04

## 2021-05-04 PROBLEM — E66.813 CLASS 3 SEVERE OBESITY DUE TO EXCESS CALORIES WITH SERIOUS COMORBIDITY AND BODY MASS INDEX (BMI) OF 45.0 TO 49.9 IN ADULT: Status: ACTIVE | Noted: 2021-05-04

## 2021-05-04 RX ORDER — LANCETS 28 GAUGE
1 EACH MISCELLANEOUS DAILY
Qty: 100 EACH | Refills: 3 | Status: SHIPPED | OUTPATIENT
Start: 2021-05-04

## 2021-05-04 ASSESSMENT — ENCOUNTER SYMPTOMS
TROUBLE SWALLOWING: 0
VOMITING: 0
CHEST TIGHTNESS: 0
EYE DISCHARGE: 0
DIARRHEA: 0
COLOR CHANGE: 0
EYE REDNESS: 0
CONSTIPATION: 0
GASTROINTESTINAL NEGATIVE: 1
RHINORRHEA: 0
COUGH: 0
BACK PAIN: 0
STRIDOR: 0
NAUSEA: 0
WHEEZING: 0
SHORTNESS OF BREATH: 0
PHOTOPHOBIA: 0
ALLERGIC/IMMUNOLOGIC NEGATIVE: 1
EYE ITCHING: 0
EYE PAIN: 0
APNEA: 0
ABDOMINAL PAIN: 0
RESPIRATORY NEGATIVE: 1
CHOKING: 0
BLOOD IN STOOL: 0
SORE THROAT: 0
SINUS PRESSURE: 0

## 2021-06-07 ENCOUNTER — NURSE ONLY (OUTPATIENT)
Dept: FAMILY MEDICINE CLINIC | Age: 61
End: 2021-06-07
Payer: MEDICAID

## 2021-06-07 DIAGNOSIS — E11.9 TYPE 2 DIABETES MELLITUS WITHOUT COMPLICATION, WITHOUT LONG-TERM CURRENT USE OF INSULIN (HCC): ICD-10-CM

## 2021-06-07 LAB
ANION GAP SERPL CALCULATED.3IONS-SCNC: 18 MMOL/L (ref 3–16)
BUN BLDV-MCNC: 19 MG/DL (ref 7–20)
CALCIUM SERPL-MCNC: 10.3 MG/DL (ref 8.3–10.6)
CHLORIDE BLD-SCNC: 104 MMOL/L (ref 99–110)
CO2: 22 MMOL/L (ref 21–32)
CREAT SERPL-MCNC: 0.9 MG/DL (ref 0.6–1.2)
GFR AFRICAN AMERICAN: >60
GFR NON-AFRICAN AMERICAN: >60
GLUCOSE BLD-MCNC: 136 MG/DL (ref 70–99)
POTASSIUM SERPL-SCNC: 4.6 MMOL/L (ref 3.5–5.1)
SODIUM BLD-SCNC: 144 MMOL/L (ref 136–145)

## 2021-06-07 PROCEDURE — 36415 COLL VENOUS BLD VENIPUNCTURE: CPT | Performed by: NURSE PRACTITIONER

## 2021-06-08 LAB
ESTIMATED AVERAGE GLUCOSE: 134.1 MG/DL
HBA1C MFR BLD: 6.3 %

## 2021-08-03 DIAGNOSIS — E11.9 TYPE 2 DIABETES MELLITUS WITHOUT COMPLICATION, WITHOUT LONG-TERM CURRENT USE OF INSULIN (HCC): ICD-10-CM

## 2021-08-03 DIAGNOSIS — E78.2 MIXED HYPERLIPIDEMIA: ICD-10-CM

## 2021-08-03 RX ORDER — GLIPIZIDE 5 MG/1
5 TABLET, FILM COATED, EXTENDED RELEASE ORAL DAILY
Qty: 90 TABLET | Refills: 0 | Status: SHIPPED | OUTPATIENT
Start: 2021-08-03 | End: 2021-11-08

## 2021-08-03 RX ORDER — EMPAGLIFLOZIN 10 MG/1
TABLET, FILM COATED ORAL
Qty: 90 TABLET | Refills: 0 | Status: SHIPPED | OUTPATIENT
Start: 2021-08-03 | End: 2021-10-13 | Stop reason: ALTCHOICE

## 2021-08-03 RX ORDER — SIMVASTATIN 40 MG
TABLET ORAL
Qty: 90 TABLET | Refills: 0 | Status: SHIPPED | OUTPATIENT
Start: 2021-08-03 | End: 2021-10-13 | Stop reason: SDUPTHER

## 2021-10-13 ENCOUNTER — OFFICE VISIT (OUTPATIENT)
Dept: FAMILY MEDICINE CLINIC | Age: 61
End: 2021-10-13
Payer: MEDICAID

## 2021-10-13 VITALS
HEART RATE: 74 BPM | WEIGHT: 275 LBS | SYSTOLIC BLOOD PRESSURE: 120 MMHG | OXYGEN SATURATION: 98 % | BODY MASS INDEX: 48.73 KG/M2 | HEIGHT: 63 IN | DIASTOLIC BLOOD PRESSURE: 80 MMHG

## 2021-10-13 DIAGNOSIS — I10 ESSENTIAL HYPERTENSION: ICD-10-CM

## 2021-10-13 DIAGNOSIS — M54.2 NECK PAIN, ACUTE: ICD-10-CM

## 2021-10-13 DIAGNOSIS — E55.9 VITAMIN D DEFICIENCY: ICD-10-CM

## 2021-10-13 DIAGNOSIS — E78.2 MIXED HYPERLIPIDEMIA: ICD-10-CM

## 2021-10-13 DIAGNOSIS — M54.42 ACUTE LEFT-SIDED LOW BACK PAIN WITH LEFT-SIDED SCIATICA: ICD-10-CM

## 2021-10-13 DIAGNOSIS — E11.9 TYPE 2 DIABETES MELLITUS WITHOUT COMPLICATION, WITHOUT LONG-TERM CURRENT USE OF INSULIN (HCC): Primary | ICD-10-CM

## 2021-10-13 DIAGNOSIS — E66.01 CLASS 3 SEVERE OBESITY DUE TO EXCESS CALORIES WITH SERIOUS COMORBIDITY AND BODY MASS INDEX (BMI) OF 45.0 TO 49.9 IN ADULT (HCC): ICD-10-CM

## 2021-10-13 PROBLEM — E78.00 PURE HYPERCHOLESTEROLEMIA: Status: RESOLVED | Noted: 2017-04-25 | Resolved: 2021-10-13

## 2021-10-13 LAB
CREATININE URINE: 135.4 MG/DL (ref 28–259)
MICROALBUMIN UR-MCNC: <1.2 MG/DL
MICROALBUMIN/CREAT UR-RTO: NORMAL MG/G (ref 0–30)

## 2021-10-13 PROCEDURE — 99214 OFFICE O/P EST MOD 30 MIN: CPT | Performed by: NURSE PRACTITIONER

## 2021-10-13 PROCEDURE — 2022F DILAT RTA XM EVC RTNOPTHY: CPT | Performed by: NURSE PRACTITIONER

## 2021-10-13 PROCEDURE — 3044F HG A1C LEVEL LT 7.0%: CPT | Performed by: NURSE PRACTITIONER

## 2021-10-13 PROCEDURE — G8484 FLU IMMUNIZE NO ADMIN: HCPCS | Performed by: NURSE PRACTITIONER

## 2021-10-13 PROCEDURE — 3017F COLORECTAL CA SCREEN DOC REV: CPT | Performed by: NURSE PRACTITIONER

## 2021-10-13 PROCEDURE — 83036 HEMOGLOBIN GLYCOSYLATED A1C: CPT | Performed by: NURSE PRACTITIONER

## 2021-10-13 PROCEDURE — G8417 CALC BMI ABV UP PARAM F/U: HCPCS | Performed by: NURSE PRACTITIONER

## 2021-10-13 PROCEDURE — 1036F TOBACCO NON-USER: CPT | Performed by: NURSE PRACTITIONER

## 2021-10-13 PROCEDURE — G8427 DOCREV CUR MEDS BY ELIG CLIN: HCPCS | Performed by: NURSE PRACTITIONER

## 2021-10-13 PROCEDURE — 81002 URINALYSIS NONAUTO W/O SCOPE: CPT | Performed by: NURSE PRACTITIONER

## 2021-10-13 RX ORDER — SIMVASTATIN 40 MG
40 TABLET ORAL NIGHTLY
Qty: 90 TABLET | Refills: 3 | Status: SHIPPED | OUTPATIENT
Start: 2021-10-13 | End: 2022-09-15 | Stop reason: SDUPTHER

## 2021-10-13 ASSESSMENT — ENCOUNTER SYMPTOMS
EYE DISCHARGE: 0
TROUBLE SWALLOWING: 0
BACK PAIN: 1
GASTROINTESTINAL NEGATIVE: 1
ABDOMINAL DISTENTION: 0
DIARRHEA: 0
ABDOMINAL PAIN: 0
RECTAL PAIN: 0
COUGH: 0
EYE PAIN: 0
CHOKING: 0
SHORTNESS OF BREATH: 0
SINUS PRESSURE: 0
WHEEZING: 0
NAUSEA: 0
RESPIRATORY NEGATIVE: 1
BLOOD IN STOOL: 0
ANAL BLEEDING: 0
STRIDOR: 0
COLOR CHANGE: 0
ALLERGIC/IMMUNOLOGIC NEGATIVE: 1
RHINORRHEA: 0
SORE THROAT: 0
VOMITING: 0
CONSTIPATION: 0
EYE ITCHING: 0
EYE REDNESS: 0
PHOTOPHOBIA: 0
APNEA: 0
CHEST TIGHTNESS: 0

## 2021-10-13 NOTE — PROGRESS NOTES
[facility] PHYSICIAN PRACTICES  [facility] FAMILY MEDICINE  [address] [place] [address]  [phone]  Dept: [phone]  Dept Fax: [phone]  Loc: [phone]    Tresa Hwagn is a 57 y.o. female who presents today for her medical conditions/complaints as noted below. Tresa Hwang is c/o of Diabetes (pt states she does check at home and when she eats bad she can tell her numbers are higher. ), Hyperlipidemia (pt takes medication every day.), and Hypertension (pt takes her medication every day and occasionally checks it at home. )        HPI:     Chief Complaint   Patient presents with    Diabetes     pt states she does check at home and when she eats bad she can tell her numbers are higher.  Hyperlipidemia     pt takes medication every day.  Hypertension     pt takes her medication every day and occasionally checks it at home. HPI    Patient is here today to follow up on hypertension. Taking medications as prescribed- Jardiance 10 mg daily, Glipizide 5 mg XL, and Metformin. Is trying to adhere to a no salt diet. Denies any chest pain, leg swelling, orthopnea, dizziness. She is wanting to try to go off of Jardiance. Patient is here for hyperlipidemia follow up. Taking medication as prescribed. Denies any side effects to the medication. Trying to adhere to a low cholesterol diet. Denies any generalized myalgias. Patient is here for follow up on diabetes. Taking medication as prescribed. Denies any hypoglycemia episodes. Denies any increased urination, hunger, or thirst.  Trying to eat a healthy, diabetic diet. She admits that when she does not watch her diet very well her glucose does increase. She has lost five pounds since last year. She has not had an eye exam this year. She is going to schedule her eye exam.     Patient is here to follow up on vitamin D deficiency. Patient is taking vitamin D supplement without any adverse effects as prescribed.       She [place] 6 [place] and was walking more and returned a few weeks ago. She admits she was having significant low back pain radiating down her left leg. She states she had to return home early because of how severe her pain was. She states that her pain seemed to resolve upon returning home when she was resting more. She was also having neck pain that was radiating down her left arm. This pain also seemed to resolve when she was resting. She denied having any trauma when she was on vacation. She is never had this type of pain before. She states she had this pain for at least 4 weeks until it was improving.     Past Medical History:   Diagnosis Date    Diabetes mellitus ([address]     Fractures     Hyperlipidemia     Hypertension     Obesity     Seasonal allergies     Type 2 diabetes mellitus without complication (HCC)       Past Surgical History:   Procedure Laterality Date    TONSILLECTOMY      TUBAL LIGATION  1983       Family History   Problem Relation Age of Onset    High Blood Pressure Mother     Cancer Mother        Social History     Tobacco Use    Smoking status: Never Smoker    Smokeless tobacco: Never Used   Substance Use Topics    Alcohol use: No      Current Outpatient Medications   Medication Sig Dispense Refill    simvastatin (ZOCOR) 40 MG tablet Take 1 tablet by mouth nightly 90 tablet 3    glipiZIDE (GLUCOTROL XL) 5 MG extended release tablet Take 1 tablet by mouth daily 90 tablet 0    metFORMIN (GLUCOPHAGE) 500 MG tablet TAKE TWO TABLETS BY MOUTH TWICE A DAY WITH MEALS 360 tablet 1    losartan (COZAAR) 25 MG tablet TAKE ONE TABLET BY MOUTH DAILY 90 tablet 1    FreeStyle Lancets MISC 1 each by Does not apply route daily 100 each 3    blood glucose test strips (ASCENSIA AUTODISC VI;ONE TOUCH ULTRA TEST VI) strip 1 each by In Vitro route daily 100 each 3    glucose monitoring kit (FREESTYLE) monitoring kit 1 kit by Does not apply route daily 1 kit 0    fluticasone (FLONASE) 50 MCG/ACT nasal spray 1 spray by Nasal route daily 1 Bottle 3    cetirizine (ZYRTEC ALLERGY) 10 MG tablet Take 1 tablet by mouth daily 90 tablet 3    loratadine (CLARITIN) 10 MG tablet Take 1 tablet by mouth daily 90 tablet 3    vitamin D (CHOLECALCIFEROL) 1000 UNIT TABS tablet Take 1 tablet by mouth daily 30 tablet 3    aspirin 81 MG tablet Take 81 mg by mouth daily       No current facility-administered medications for this visit. No Known Allergies    :      Review of Systems   Constitutional: Negative. Negative for activity change, appetite change, chills, diaphoresis, fatigue, fever and unexpected weight change. HENT: Negative. Negative for ear pain, rhinorrhea, sinus pressure, sneezing, sore throat and trouble swallowing. Eyes: Negative for photophobia, pain, discharge, redness, itching and visual disturbance. Respiratory: Negative. Negative for apnea, cough, choking, chest tightness, shortness of breath, wheezing and stridor. Cardiovascular: Negative for chest pain, palpitations and leg swelling. Gastrointestinal: Negative. Negative for abdominal distention, abdominal pain, anal bleeding, blood in stool, constipation, diarrhea, nausea, rectal pain and vomiting. Genitourinary: Negative. Negative for decreased urine volume, difficulty urinating, dysuria, enuresis, flank pain, frequency, genital sores, hematuria and urgency. Musculoskeletal: Positive for back pain and neck pain. Negative for arthralgias, gait problem, joint swelling, myalgias and neck stiffness. Skin: Negative. Negative for color change, pallor, rash and wound. Allergic/Immunologic: Negative. Neurological: Negative. Negative for dizziness, facial asymmetry, weakness, light-headedness and headaches. Psychiatric/Behavioral: Negative for agitation, behavioral problems, confusion, decreased concentration, dysphoric mood, hallucinations, self-injury, sleep disturbance and suicidal ideas.  The patient is not nervous/anxious and is not hyperactive. Objective:     Vitals:    10/13/21 0753   BP: 120/80   Site: Right Upper Arm   Position: Sitting   Cuff Size: Large Adult   Pulse: 74   SpO2: 98%   Weight: 275 lb (124.7 kg)   Height: 5' 3\" (1.6 m)     Wt Readings from Last 3 Encounters:   10/13/21 275 lb (124.7 kg)   04/29/21 277 lb (125.6 kg)   10/08/20 280 lb (127 kg)     Temp Readings from Last 3 Encounters:   05/17/19 98 °F (36.7 °C)   05/03/19 98.2 °F (36.8 °C)   04/22/19 98.4 °F (36.9 °C)     BP Readings from Last 3 Encounters:   10/13/21 120/80   04/29/21 118/68   10/08/20 130/70     Pulse Readings from Last 3 Encounters:   10/13/21 74   04/29/21 87   10/08/20 83     Physical Exam  Vitals and nursing note reviewed. Constitutional:       General: She is not in acute distress. Appearance: Normal appearance. She is well-developed. She is obese. She is not diaphoretic. HENT:      Head: Normocephalic and atraumatic. Right Ear: Tympanic membrane, ear canal and external ear normal. There is no impacted cerumen. Left Ear: Tympanic membrane, ear canal and external ear normal. There is no impacted cerumen. Nose: Nose normal. No congestion or rhinorrhea. Mouth/Throat:      Mouth: Mucous membranes are moist.      Pharynx: Oropharynx is clear. No oropharyngeal exudate or posterior oropharyngeal erythema. Eyes:      General: No scleral icterus. Right eye: No discharge. Left eye: No discharge. Conjunctiva/sclera: Conjunctivae normal.      Pupils: Pupils are equal, round, and reactive to light. Neck:      Vascular: No carotid bruit. Trachea: No tracheal deviation. Cardiovascular:      Rate and Rhythm: Normal rate and regular rhythm. Pulses: Normal pulses. Heart sounds: Normal heart sounds. No murmur heard. No friction rub. No gallop. Pulmonary:      Effort: Pulmonary effort is normal. No respiratory distress. Breath sounds: Normal breath sounds.  No Non- 06/07/2021 >60  >60 Final    Comment: >60 mL/min/1.73m2 EGFR, calc. for ages 25 and older using the  MDRD formula (not corrected for weight), is valid for stable  renal function.  GFR  06/07/2021 >60  >60 Final    Comment: Chronic Kidney Disease: less than 60 ml/min/1.73 sq.m. Kidney Failure: less than 15 ml/min/1.73 sq.m. Results valid for patients 18 years and older.  Calcium 06/07/2021 10.3  8.3 - 10.6 mg/dL Final    Hemoglobin A1C 06/07/2021 6.3  See comment % Final    Comment: Comment:  Diagnosis of Diabetes: > or = 6.5%  Increased risk of diabetes (Prediabetes): 5.7-6.4%  Glycemic Control: Nonpregnant Adults: <7.0%                    Pregnant: <6.0%        eAG 06/07/2021 134.1  mg/dL Final           Assessment & Plan: The following diagnoses and conditions are stable with no further orders unless indicated:  1. Type 2 diabetes mellitus without complication, without long-term current use of insulin ([facility]    2. Mixed hyperlipidemia    3. Essential hypertension    4. Vitamin D deficiency    5. Class 3 severe obesity due to excess calories with serious comorbidity and body mass index (BMI) of 45.0 to 49.9 in adult ([address]    6. Acute left-sided low back pain with left-sided sciatica    7. Neck pain, acute        Rihc Cram was seen today for diabetes, hyperlipidemia and hypertension. A1c today 6.4. She is doing well with diet and exercise. She is even lost some weight. She is continue working on diet and exercise. She is wanting to try to go off the [place]. She may stop the Jardiance and continue on the Metformin and glipizide XL. She is to call with her glucose readings in about 2 weeks. If her glucose readings continue to elevate with stopping the Jardiance greater than 150, would recommend increasing the glipizide to 10 mg XL. She is doing well on her statin. Blood pressure stable today. No changes in medication.   She is doing well with her fluticasone (FLONASE) 50 MCG/ACT nasal spray 1 spray by Nasal route daily Yes JAYLEN Chand CNP   cetirizine (ZYRTEC ALLERGY) 10 MG tablet Take 1 tablet by mouth daily Yes JAYLEN Chand CNP   loratadine (CLARITIN) 10 MG tablet Take 1 tablet by mouth daily Yes JAYLEN Chand CNP   vitamin D (CHOLECALCIFEROL) 1000 UNIT TABS tablet Take 1 tablet by mouth daily Yes JAYLEN Chand CNP   aspirin 81 MG tablet Take 81 mg by mouth daily Yes Historical Provider, MD     Orders Placed This Encounter   Medications    simvastatin (ZOCOR) 40 MG tablet     Sig: Take 1 tablet by mouth nightly     Dispense:  90 tablet     Refill:  3         Return in about 5 months (around 3/13/2022) for [facility]- 40 min appt; Due for fasting labs with A1C . Patient should call the office immediately with new or ongoing signs or symptoms or worsening, or proceedto the emergency room. No changes in past medical history, past surgical history, social history, or family history were noted during the patient encounter unless specifically listed above. All updates of past medicalhistory, past surgical history, social history, or family history were reviewed personally by me during the office visit. All problems listed in the assessment are stable unless noted otherwise. Medication profilereviewed personally by me during the office visit. Medication side effects and possible impairments from medications were discussed as applicable. Call if pattern of symptoms change or persists for an extended time. This document was prepared by a combination of typing and transcription through a voice recognition software. All medications have the potential for adverse effects. All medications effect each person differently. Please read and review provided information related to medication.  If the medication that you have been prescribed has the potential to cause sedation, do not drive or operate car, truck, or heavy machinery until you know how the medication will effect you. If you experience any adverse effects from the medication, please call the office or report to the emergency department. I have recommended that this patient have a mammogram, pap smear, flu shot, tetanus booster and colonoscoopy but she declines at this time. I have discussed the risks and benefits of this procedure with her. The patient verbalizes understanding.

## 2021-11-06 DIAGNOSIS — E11.9 TYPE 2 DIABETES MELLITUS WITHOUT COMPLICATION, WITHOUT LONG-TERM CURRENT USE OF INSULIN (HCC): ICD-10-CM

## 2021-11-08 RX ORDER — GLIPIZIDE 5 MG/1
TABLET, FILM COATED, EXTENDED RELEASE ORAL
Qty: 90 TABLET | Refills: 0 | Status: SHIPPED | OUTPATIENT
Start: 2021-11-08 | End: 2022-02-15

## 2021-11-08 NOTE — TELEPHONE ENCOUNTER
Last visit was 10/13/21  Future Appointments   Date Time Provider Martha Dodge   3/23/2022  7:00 AM JAYLEN Taylor - CNP [facility] [place] [facility]

## 2022-03-23 ENCOUNTER — OFFICE VISIT (OUTPATIENT)
Dept: FAMILY MEDICINE CLINIC | Age: 62
End: 2022-03-23
Payer: MEDICAID

## 2022-03-23 VITALS
BODY MASS INDEX: 49.61 KG/M2 | SYSTOLIC BLOOD PRESSURE: 132 MMHG | HEART RATE: 83 BPM | WEIGHT: 280 LBS | DIASTOLIC BLOOD PRESSURE: 76 MMHG | OXYGEN SATURATION: 96 % | HEIGHT: 63 IN

## 2022-03-23 DIAGNOSIS — E55.9 VITAMIN D DEFICIENCY: ICD-10-CM

## 2022-03-23 DIAGNOSIS — M25.552 CHRONIC LEFT HIP PAIN: ICD-10-CM

## 2022-03-23 DIAGNOSIS — G89.29 CHRONIC BILATERAL LOW BACK PAIN WITHOUT SCIATICA: ICD-10-CM

## 2022-03-23 DIAGNOSIS — Z13.29 SCREENING FOR THYROID DISORDER: ICD-10-CM

## 2022-03-23 DIAGNOSIS — R29.898 BILATERAL LEG WEAKNESS: ICD-10-CM

## 2022-03-23 DIAGNOSIS — N39.498 OTHER URINARY INCONTINENCE: ICD-10-CM

## 2022-03-23 DIAGNOSIS — E66.01 CLASS 3 SEVERE OBESITY DUE TO EXCESS CALORIES WITH SERIOUS COMORBIDITY AND BODY MASS INDEX (BMI) OF 45.0 TO 49.9 IN ADULT (HCC): ICD-10-CM

## 2022-03-23 DIAGNOSIS — E78.2 MIXED HYPERLIPIDEMIA: ICD-10-CM

## 2022-03-23 DIAGNOSIS — I10 ESSENTIAL HYPERTENSION: ICD-10-CM

## 2022-03-23 DIAGNOSIS — Z13.21 ENCOUNTER FOR VITAMIN DEFICIENCY SCREENING: ICD-10-CM

## 2022-03-23 DIAGNOSIS — E11.9 TYPE 2 DIABETES MELLITUS WITHOUT COMPLICATION, WITHOUT LONG-TERM CURRENT USE OF INSULIN (HCC): Primary | ICD-10-CM

## 2022-03-23 DIAGNOSIS — M54.50 CHRONIC BILATERAL LOW BACK PAIN WITHOUT SCIATICA: ICD-10-CM

## 2022-03-23 DIAGNOSIS — G89.29 CHRONIC LEFT HIP PAIN: ICD-10-CM

## 2022-03-23 LAB
A/G RATIO: 1.7 (ref 1.1–2.2)
ALBUMIN SERPL-MCNC: 4.3 G/DL (ref 3.4–5)
ALP BLD-CCNC: 92 U/L (ref 40–129)
ALT SERPL-CCNC: 22 U/L (ref 10–40)
ANION GAP SERPL CALCULATED.3IONS-SCNC: 15 MMOL/L (ref 3–16)
AST SERPL-CCNC: 41 U/L (ref 15–37)
BASOPHILS ABSOLUTE: 0.1 K/UL (ref 0–0.2)
BASOPHILS RELATIVE PERCENT: 0.8 %
BILIRUB SERPL-MCNC: 0.5 MG/DL (ref 0–1)
BUN BLDV-MCNC: 15 MG/DL (ref 7–20)
CALCIUM SERPL-MCNC: 9.8 MG/DL (ref 8.3–10.6)
CHLORIDE BLD-SCNC: 101 MMOL/L (ref 99–110)
CHOLESTEROL, TOTAL: 177 MG/DL (ref 0–199)
CO2: 22 MMOL/L (ref 21–32)
CREAT SERPL-MCNC: 0.8 MG/DL (ref 0.6–1.2)
EOSINOPHILS ABSOLUTE: 0.5 K/UL (ref 0–0.6)
EOSINOPHILS RELATIVE PERCENT: 5.5 %
FOLATE: 5.38 NG/ML (ref 4.78–24.2)
GFR AFRICAN AMERICAN: >60
GFR NON-AFRICAN AMERICAN: >60
GLUCOSE BLD-MCNC: 144 MG/DL (ref 70–99)
HCT VFR BLD CALC: 43.4 % (ref 36–48)
HDLC SERPL-MCNC: 59 MG/DL (ref 40–60)
HEMOGLOBIN: 14.1 G/DL (ref 12–16)
LDL CHOLESTEROL CALCULATED: 94 MG/DL
LYMPHOCYTES ABSOLUTE: 3 K/UL (ref 1–5.1)
LYMPHOCYTES RELATIVE PERCENT: 34.2 %
MCH RBC QN AUTO: 28.2 PG (ref 26–34)
MCHC RBC AUTO-ENTMCNC: 32.5 G/DL (ref 31–36)
MCV RBC AUTO: 86.9 FL (ref 80–100)
MONOCYTES ABSOLUTE: 0.8 K/UL (ref 0–1.3)
MONOCYTES RELATIVE PERCENT: 9.5 %
NEUTROPHILS ABSOLUTE: 4.4 K/UL (ref 1.7–7.7)
NEUTROPHILS RELATIVE PERCENT: 50 %
PDW BLD-RTO: 15.3 % (ref 12.4–15.4)
PLATELET # BLD: 443 K/UL (ref 135–450)
PMV BLD AUTO: 8.1 FL (ref 5–10.5)
POTASSIUM SERPL-SCNC: 5 MMOL/L (ref 3.5–5.1)
RBC # BLD: 5 M/UL (ref 4–5.2)
SODIUM BLD-SCNC: 138 MMOL/L (ref 136–145)
TOTAL PROTEIN: 6.8 G/DL (ref 6.4–8.2)
TRIGL SERPL-MCNC: 120 MG/DL (ref 0–150)
TSH REFLEX: 2.98 UIU/ML (ref 0.27–4.2)
VITAMIN B-12: 361 PG/ML (ref 211–911)
VITAMIN D 25-HYDROXY: 40.1 NG/ML
VLDLC SERPL CALC-MCNC: 24 MG/DL
WBC # BLD: 8.7 K/UL (ref 4–11)

## 2022-03-23 PROCEDURE — 3017F COLORECTAL CA SCREEN DOC REV: CPT | Performed by: NURSE PRACTITIONER

## 2022-03-23 PROCEDURE — 2022F DILAT RTA XM EVC RTNOPTHY: CPT | Performed by: NURSE PRACTITIONER

## 2022-03-23 PROCEDURE — G8427 DOCREV CUR MEDS BY ELIG CLIN: HCPCS | Performed by: NURSE PRACTITIONER

## 2022-03-23 PROCEDURE — 36415 COLL VENOUS BLD VENIPUNCTURE: CPT | Performed by: NURSE PRACTITIONER

## 2022-03-23 PROCEDURE — G8417 CALC BMI ABV UP PARAM F/U: HCPCS | Performed by: NURSE PRACTITIONER

## 2022-03-23 PROCEDURE — 3046F HEMOGLOBIN A1C LEVEL >9.0%: CPT | Performed by: NURSE PRACTITIONER

## 2022-03-23 PROCEDURE — 1036F TOBACCO NON-USER: CPT | Performed by: NURSE PRACTITIONER

## 2022-03-23 PROCEDURE — 99215 OFFICE O/P EST HI 40 MIN: CPT | Performed by: NURSE PRACTITIONER

## 2022-03-23 PROCEDURE — G8484 FLU IMMUNIZE NO ADMIN: HCPCS | Performed by: NURSE PRACTITIONER

## 2022-03-23 RX ORDER — BLOOD PRESSURE TEST KIT
KIT MISCELLANEOUS
Qty: 1 KIT | Refills: 0 | Status: SHIPPED | OUTPATIENT
Start: 2022-03-23

## 2022-03-23 RX ORDER — BLOOD-GLUCOSE METER
1 KIT MISCELLANEOUS DAILY
Qty: 100 EACH | Refills: 3 | Status: SHIPPED | OUTPATIENT
Start: 2022-03-23

## 2022-03-23 RX ORDER — LIDOCAINE 50 MG/G
1 PATCH TOPICAL DAILY
Qty: 30 PATCH | Refills: 2 | Status: SHIPPED | OUTPATIENT
Start: 2022-03-23

## 2022-03-23 ASSESSMENT — ENCOUNTER SYMPTOMS
EYE ITCHING: 0
RESPIRATORY NEGATIVE: 1
SORE THROAT: 0
CHEST TIGHTNESS: 0
WHEEZING: 0
STRIDOR: 0
TROUBLE SWALLOWING: 0
NAUSEA: 0
VOMITING: 0
PHOTOPHOBIA: 0
CHOKING: 0
COLOR CHANGE: 0
DIARRHEA: 0
BLOOD IN STOOL: 0
RHINORRHEA: 0
SHORTNESS OF BREATH: 0
GASTROINTESTINAL NEGATIVE: 1
EYE DISCHARGE: 0
ALLERGIC/IMMUNOLOGIC NEGATIVE: 1
COUGH: 0
SINUS PRESSURE: 0
APNEA: 0
CONSTIPATION: 0
EYE PAIN: 0
EYE REDNESS: 0
BACK PAIN: 1
ABDOMINAL PAIN: 0

## 2022-03-23 ASSESSMENT — PATIENT HEALTH QUESTIONNAIRE - PHQ9
SUM OF ALL RESPONSES TO PHQ QUESTIONS 1-9: 0
SUM OF ALL RESPONSES TO PHQ QUESTIONS 1-9: 0
1. LITTLE INTEREST OR PLEASURE IN DOING THINGS: 0
2. FEELING DOWN, DEPRESSED OR HOPELESS: 0
SUM OF ALL RESPONSES TO PHQ9 QUESTIONS 1 & 2: 0
SUM OF ALL RESPONSES TO PHQ QUESTIONS 1-9: 0
SUM OF ALL RESPONSES TO PHQ QUESTIONS 1-9: 0

## 2022-03-23 NOTE — PATIENT INSTRUCTIONS
Patient Education      Patient Education        Kegel Exercises: Care Instructions  Overview     Kegel exercises strengthen muscles around the bladder. These muscles control the flow of urine. Kegel exercises are sometime called \"pelvic floor\" exercises. They can help prevent urine leakage and keep the pelvic organs in place. Kegel exercises can strengthen pelvic muscles that have been weakened by age, pregnancy, childbirth, and surgery. They may help prevent or treat urine leakage. You do Kegel exercises by tightening the muscles you use when you urinate. You will likely need to do these exercises for several weeks to get better. Follow-up care is a key part of your treatment and safety. Be sure to make and go to all appointments, and call your doctor if you are having problems. It's also a good idea to know your test results and keep a list of the medicines you take. How can you care for yourself at home? · Do Kegel exercises. ? Find the muscles you need to strengthen. To do this, tighten the muscles that stop your urine while you are going to the bathroom. These are the same muscles you squeeze during Kegel exercises. ? Squeeze the muscles as hard as you can. Your belly and thighs should not move. ? Hold the squeeze for 3 seconds. Then relax for 3 seconds. ? Start with 3 seconds, and then add 1 second each week until you are able to squeeze for 10 seconds. ? Repeat the exercise 10 to 15 times for each session. Do three or more sessions each day. · You can check to see if you are using the right muscles. ? Tighten the muscles that help you stop passing gas or keep you from urinating. Make sure you aren't using your stomach, leg, or buttock muscles. ? Place a finger in your vagina and squeeze around it. You are doing them right when you feel pressure around your finger. Your doctor may also suggest that you put special weights in your vagina while you do the exercises.   · Check with your doctor if you don't notice a difference after trying these exercises for several weeks. Your doctor may suggest getting help from a physical therapist or recommend other treatment. Where can you learn more? Go to https://KanboxpezbigniewTribe Wearables.VM6 Software. org and sign in to your [email or web address] account. Enter K009 in the [facility] box to learn more about \"Kegel Exercises: Care Instructions. \"     If you do not have an account, please click on the \"Sign Up Now\" link. Current as of: February 10, 2021               Content Version: 13.1  © 0876-6535 [employer]. Care instructions adapted under license by [facility] ([facility]). If you have questions about a medical condition or this instruction, always ask your healthcare professional. [address] any warranty or liability for your use of this information. Bladder Training: Care Instructions  Your Care Instructions     Bladder training is used to treat urge incontinence and stress incontinence. Urge incontinence means that the need to urinate comes on so fast that you can't get to a toilet in time. Stress incontinence means that you leak urine because of pressure on your bladder. For example, it may happen when you laugh, cough, or lift something heavy. Bladder training can increase how long you can wait before you have to urinate. It can also help your bladder hold more urine. And it can give you better control over the urge to urinate. It is important to remember that bladder training takes a few weeks to a few months to make a difference. You may not see results right away, but don't give up. Follow-up care is a key part of your treatment and safety. Be sure to make and go to all appointments, and call your doctor if you are having problems. It's also a good idea to know your test results and keep a list of the medicines you take. How can you care for yourself at home?   Work with your doctor to come up with a bladder training program that is right for you. You may use one or more of the following methods. Delayed urination  · In the beginning, try to keep from urinating for 5 minutes after you first feel the need to go. · While you wait, take deep, slow breaths to relax. Kegel exercises can also help you delay the need to go to the bathroom. · After some practice, when you can easily wait 5 minutes to urinate, try to wait 10 minutes before you urinate. · Slowly increase the waiting period until you are able to control when you have to urinate. Scheduled urination  · Empty your bladder when you first wake up in the morning. · Schedule times throughout the day when you will urinate. · Start by going to the bathroom every hour, even if you don't need to go. · Slowly increase the time between trips to the bathroom. · When you have found a schedule that works well for you, keep doing it. · If you wake up during the night and have to urinate, do it. Apply your schedule to waking hours only. Kegel exercises  These tighten and strengthen pelvic muscles, which can help you control the flow of urine. To do Kegel exercises:  · Squeeze the same muscles you would use to stop your urine. Your belly and thighs should not move. · Hold the squeeze for 3 seconds, and then relax for 3 seconds. · Start with 3 seconds. Then add 1 second each week until you are able to squeeze for 10 seconds. · Repeat the exercise 10 to 15 times a session. Do three or more sessions a day. When should you call for help? Watch closely for changes in your health, and be sure to contact your doctor if:    · Your incontinence is getting worse.     · You do not get better as expected. Where can you learn more? Go to https://DimensionU (formerly Tabula Digita)pezbigniewInteractive Performance Solutionseb.Epunchit. org and sign in to your Notifixious account. Enter Z779 in the [employer] box to learn more about \"Bladder Training: Care Instructions. \"     If you do not have an account, please click on the \"Sign Up Now\" link.  Current as of: February 10, 2021               Content Version: 13.1  © 7847-1895 [employer]. Care instructions adapted under license by [facility] ([facility]). If you have questions about a medical condition or this instruction, always ask your healthcare professional. [address] any warranty or liability for your use of this information.

## 2022-03-23 NOTE — PROGRESS NOTES
[facility] PHYSICIAN PRACTICES  [facility]  [address] [place] [address]  [phone]  Dept: [phone]  Dept Fax: [phone]  Loc: [phone]    Zakiya Morales is a 57 y.o. female who presents today for her medical conditions/complaints as noted below. Zakiya Morales is c/o of Hypertension (pt does not check at home but would if she had a machine. pt takes medication every day), Diabetes (pt needs test trips to take her sugar as she is out of them. pt takes medication every day and does try to watch diet. ), and Hyperlipidemia (pt is fasting. pt takes medication every day. )    HPI:     Chief Complaint   Patient presents with    Hypertension     pt does not check at home but would if she had a machine. pt takes medication every day    Diabetes     pt needs test trips to take her sugar as she is out of them. pt takes medication every day and does try to watch diet.  Hyperlipidemia     pt is fasting. pt takes medication every day. HPI    Patient is here today to follow up on hypertension. Taking medications as prescribed. Is trying to adhere to a no salt diet. Denies any chest pain, leg swelling, orthopnea, dizziness. She is not checking her blood pressure at home, but is interested in a blood pressure machine so she can check her blood pressure at home. Patient is here for follow up on diabetes. Taking medication as prescribed - Glipizide XL 5 mg daily and Metformin 1,000 mg BID. Denies any hypoglycemia episodes. Denies any increased urination, hunger, or thirst.  Trying to eat a healthy, diabetic diet. Her last A1C was 6.3 in 06/2021. She is taking an ARB, statin, and aspirin 81 mg daily. She has an eye exam scheduled for next month. She admits that her glucose was elevated when she was sick back in November. She feels like her glucose never went back down after she was sick in November.   She states that she did check her glucose 2 days ago and it was 170 about 1 hour after Medical History:   Diagnosis Date    Diabetes mellitus ([address]     Fractures     Hyperlipidemia     Hypertension     Obesity     Seasonal allergies     Type 2 diabetes mellitus without complication (HCC)       Past Surgical History:   Procedure Laterality Date    TONSILLECTOMY      TUBAL LIGATION  1983       Family History   Problem Relation Age of Onset    High Blood Pressure Mother     Cancer Mother        Social History     Tobacco Use    Smoking status: Never Smoker    Smokeless tobacco: Never Used   Substance Use Topics    Alcohol use: No      Current Outpatient Medications   Medication Sig Dispense Refill    blood glucose test strips (FREESTYLE LITE) strip 1 each by In Vitro route daily As needed. 100 each 3    Blood Pressure KIT Use to check blood pressure daily 1 kit 0    lidocaine (LIDODERM) 5 % Place 1 patch onto the skin daily 12 hours on, 12 hours off.  30 patch 2    glipiZIDE (GLUCOTROL XL) 5 MG extended release tablet TAKE ONE TABLET BY MOUTH DAILY 30 tablet 1    metFORMIN (GLUCOPHAGE) 500 MG tablet TAKE TWO TABLETS BY MOUTH TWICE A DAY WITH MEALS 120 tablet 5    losartan (COZAAR) 25 MG tablet TAKE ONE TABLET BY MOUTH DAILY 30 tablet 5    simvastatin (ZOCOR) 40 MG tablet Take 1 tablet by mouth nightly 90 tablet 3    FreeStyle Lancets MISC 1 each by Does not apply route daily 100 each 3    blood glucose test strips (ASCENSIA AUTODISC VI;ONE TOUCH ULTRA TEST VI) strip 1 each by In Vitro route daily 100 each 3    glucose monitoring kit (FREESTYLE) monitoring kit 1 kit by Does not apply route daily 1 kit 0    fluticasone (FLONASE) 50 MCG/ACT nasal spray 1 spray by Nasal route daily 1 Bottle 3    cetirizine (ZYRTEC ALLERGY) 10 MG tablet Take 1 tablet by mouth daily 90 tablet 3    loratadine (CLARITIN) 10 MG tablet Take 1 tablet by mouth daily 90 tablet 3    vitamin D (CHOLECALCIFEROL) 1000 UNIT TABS tablet Take 1 tablet by mouth daily 30 tablet 3    aspirin 81 MG tablet Take 81 mg by mouth daily       No current facility-administered medications for this visit. No Known Allergies    :      Review of Systems   Constitutional: Negative. Negative for activity change, appetite change, chills, diaphoresis, fatigue, fever and unexpected weight change. HENT: Negative. Negative for ear pain, rhinorrhea, sinus pressure, sneezing, sore throat and trouble swallowing. Eyes: Negative for photophobia, pain, discharge, redness, itching and visual disturbance. Respiratory: Negative. Negative for apnea, cough, choking, chest tightness, shortness of breath, wheezing and stridor. Cardiovascular: Negative for chest pain, palpitations and leg swelling. Gastrointestinal: Negative. Negative for abdominal pain, blood in stool, constipation, diarrhea, nausea and vomiting. Genitourinary: Positive for urgency (Chronic). Negative for decreased urine volume, difficulty urinating, dyspareunia, dysuria, enuresis, flank pain, frequency, genital sores, hematuria, menstrual problem, pelvic pain, vaginal bleeding, vaginal discharge and vaginal pain. Musculoskeletal: Positive for arthralgias (Left hip), back pain (Chronic ) and gait problem (R/t left hip pain and back pain ). Negative for joint swelling, myalgias, neck pain and neck stiffness. Skin: Negative. Negative for color change, pallor, rash and wound. Allergic/Immunologic: Negative. Neurological: Positive for weakness (Bilateral lower legs). Negative for dizziness, tremors, seizures, syncope, facial asymmetry, speech difficulty, light-headedness, numbness and headaches. Psychiatric/Behavioral: Negative for agitation, behavioral problems, confusion, decreased concentration, dysphoric mood, hallucinations, self-injury, sleep disturbance and suicidal ideas. The patient is not nervous/anxious and is not hyperactive.           Objective:     Vitals:    03/23/22 0703   BP: 132/76   Site: Right Upper Arm   Position: Sitting   Cuff Size: Large Adult   Pulse: 83   SpO2: 96%   Weight: 280 lb (127 kg)   Height: 5' 3\" (1.6 m)     Wt Readings from Last 3 Encounters:   03/23/22 280 lb (127 kg)   10/13/21 275 lb (124.7 kg)   04/29/21 277 lb (125.6 kg)     Temp Readings from Last 3 Encounters:   05/17/19 98 °F (36.7 °C)   05/03/19 98.2 °F (36.8 °C)   04/22/19 98.4 °F (36.9 °C)     BP Readings from Last 3 Encounters:   03/23/22 132/76   10/13/21 120/80   04/29/21 118/68     Pulse Readings from Last 3 Encounters:   03/23/22 83   10/13/21 74   04/29/21 87     Physical Exam  Vitals and nursing note reviewed. Constitutional:       General: She is not in acute distress. Appearance: Normal appearance. She is well-developed. She is obese. She is not diaphoretic. HENT:      Head: Normocephalic and atraumatic. Right Ear: Tympanic membrane, ear canal and external ear normal. There is no impacted cerumen. Left Ear: Tympanic membrane, ear canal and external ear normal. There is no impacted cerumen. Nose: Nose normal. No congestion or rhinorrhea. Mouth/Throat:      Mouth: Mucous membranes are moist.      Pharynx: Oropharynx is clear. No oropharyngeal exudate or posterior oropharyngeal erythema. Eyes:      General: No scleral icterus. Right eye: No discharge. Left eye: No discharge. Extraocular Movements: Extraocular movements intact. Conjunctiva/sclera: Conjunctivae normal.      Pupils: Pupils are equal, round, and reactive to light. Neck:      Vascular: No carotid bruit. Trachea: No tracheal deviation. Cardiovascular:      Rate and Rhythm: Normal rate and regular rhythm. Pulses: Normal pulses. Heart sounds: Normal heart sounds. No murmur heard. No friction rub. No gallop. Pulmonary:      Effort: Pulmonary effort is normal. No respiratory distress. Breath sounds: Normal breath sounds. No stridor. No wheezing, rhonchi or rales. Chest:      Chest wall: No tenderness.    Abdominal:      General: Bowel sounds are normal. There is no distension. Palpations: Abdomen is soft. There is no mass. Tenderness: There is no abdominal tenderness. There is no guarding or rebound. Hernia: No hernia is present. Musculoskeletal:         General: No swelling, deformity or signs of injury. Cervical back: Normal range of motion and neck supple. No rigidity. No muscular tenderness. Lumbar back: Tenderness present. Decreased range of motion. Positive right straight leg raise test and positive left straight leg raise test.      Right hip: Decreased range of motion. Decreased strength. Left hip: Decreased range of motion. Decreased strength. Right lower leg: No edema. Left lower leg: No edema. Legs:    Lymphadenopathy:      Cervical: No cervical adenopathy. Skin:     General: Skin is warm and dry. Capillary Refill: Capillary refill takes less than 2 seconds. Coloration: Skin is not jaundiced or pale. Findings: No bruising, erythema, lesion or rash. Neurological:      General: No focal deficit present. Mental Status: She is alert and oriented to person, place, and time. Mental status is at baseline. Cranial Nerves: No cranial nerve deficit. Sensory: No sensory deficit. Motor: No weakness or abnormal muscle tone. Coordination: Coordination normal.      Gait: Gait normal.      Deep Tendon Reflexes: Reflexes are normal and symmetric. Reflexes normal.   Psychiatric:         Mood and Affect: Mood normal.         Behavior: Behavior normal.         Thought Content:  Thought content normal.         Judgment: Judgment normal.         Office Visit on 10/13/2021   Component Date Value Ref Range Status    Microalbumin, Random Urine 10/13/2021 <1.20  <2.0 mg/dL Final    Creatinine, Ur 10/13/2021 135.4  28.0 - 259.0 mg/dL Final    Microalbumin Creatinine Ratio 10/13/2021 see below  0.0 - 30.0 mg/g Final    Comment: Ratio cannot be calculated since microalbumin level is below  the lower detection limit. Assessment & Plan: The following diagnoses and conditions are stable with no further orders unless indicated:  1. Type 2 diabetes mellitus without complication, without long-term current use of insulin ([facility] [address]    2. Essential hypertension    3. Vitamin D deficiency    4. Mixed hyperlipidemia    5. Chronic bilateral low back pain without sciatica    6. Chronic left hip pain    7. Bilateral leg weakness    8. Other urinary incontinence    9. Class 3 severe obesity due to excess calories with serious comorbidity and body mass index (BMI) of 45.0 to 49.9 in adult ([address]    10. Screening for thyroid disorder    11. Encounter for vitamin deficiency screening        Mirta Renee was seen today for hypertension, diabetes and hyperlipidemia. We will update A1c and labs today. Concerned that her A1c is most likely elevated. Will adjust pharmacologic therapy once her A1c results. Recommend her checking her glucose at least daily. Blood glucose test strips sent into the pharmacy. Blood pressure stable. She is doing well with her statin. She is taking her vitamin D as recommended. As far as her low back pain and her left hip pain, most likely secondary to physical inactivity and obesity and also arthritis. Recommend her getting updated x-rays. Physical therapy order sent to the pharmacy. She may use lidocaine patches daily as needed. She may continue taking Tylenol as needed for pain. Recommend weight loss. If her pain fails to improve with symptomatic treatment and physical therapy, will consider MRI versus orthopedic. Recommend weight loss. Recommend him following up with urology for her urinary incontinence. Concern that her bladder may have dropped causing some of her symptoms.   She states that she has had this previously when she became weak in the past and physical therapy actually helped with her urinary incontinence that she had on her hip.  She states she is wanting to try the physical therapy and see how she does prior to following up with urology. She was given information verbally and in her after visit summary regarding pelvic exercises and pelvic floor training. Diagnoses and all orders for this visit:    Type 2 diabetes mellitus without complication, without long-term current use of insulin (HCC)  -     blood glucose test strips (FREESTYLE LITE) strip; 1 each by In Vitro route daily As needed. -     Hemoglobin A1C    Essential hypertension  -     CBC with Auto Differential  -     Comprehensive Metabolic Panel  -     Blood Pressure KIT; Use to check blood pressure daily    Vitamin D deficiency  -     Vitamin D 25 Hydroxy    Mixed hyperlipidemia  -     Lipid Panel    Chronic bilateral low back pain without sciatica  -     [facility] - [facility] (Ortho & Sports)-OSR  -     lidocaine (LIDODERM) 5 %; Place 1 patch onto the skin daily 12 hours on, 12 hours off.  -     XR LUMBAR SPINE (2-3 VIEWS); Future    Chronic left hip pain  -     [facility] - [facility] [place] (Ortho & Sports)-OSR  -     lidocaine (LIDODERM) 5 %; Place 1 patch onto the skin daily 12 hours on, 12 hours off.  -     XR HIP LEFT (2-3 VIEWS); Future    Bilateral leg weakness  -     [facility] - [place] [facility] [place] ([facility])-OSR  -     lidocaine (LIDODERM) 5 %; Place 1 patch onto the skin daily 12 hours on, 12 hours off. Other urinary incontinence  -     [facility] - Terrence Gomez MD, Urology, [facility]    Class 3 severe obesity due to excess calories with serious comorbidity and body mass index (BMI) of 45.0 to 49.9 in adult [facility])    Screening for thyroid disorder  -     TSH with Reflex    Encounter for vitamin deficiency screening  -     Vitamin B12 & Folate      Prior to Visit Medications    Medication Sig Taking? Authorizing Provider   blood glucose test strips (FREESTYLE LITE) strip 1 each by In Vitro route daily As needed.  Yes Fawad Fernández JAYLEN Gamez CNP   Blood Pressure KIT Use to check blood pressure daily Yes JAYLEN Rowley CNP   lidocaine (LIDODERM) 5 % Place 1 patch onto the skin daily 12 hours on, 12 hours off. Yes JAYLEN Rowley CNP   glipiZIDE (GLUCOTROL XL) 5 MG extended release tablet TAKE ONE TABLET BY MOUTH DAILY Yes JAYLEN Rowley CNP   metFORMIN (GLUCOPHAGE) 500 MG tablet TAKE TWO TABLETS BY MOUTH TWICE A DAY WITH MEALS Yes JAYLEN Rowley CNP   losartan (COZAAR) 25 MG tablet TAKE ONE TABLET BY MOUTH DAILY Yes JAYLEN Rowley CNP   simvastatin (ZOCOR) 40 MG tablet Take 1 tablet by mouth nightly Yes JAYLEN Rowley CNP   FreeStyle Lancets MISC 1 each by Does not apply route daily Yes JAYLEN Rowley CNP   blood glucose test strips (ASCENSIA AUTODISC VI;ONE TOUCH ULTRA TEST VI) strip 1 each by In Vitro route daily Yes JAYLEN Rowley CNP   glucose monitoring kit (FREESTYLE) monitoring kit 1 kit by Does not apply route daily Yes JAYLEN Rowley CNP   fluticasone (FLONASE) 50 MCG/ACT nasal spray 1 spray by Nasal route daily Yes JAYLEN Rowley CNP   cetirizine (ZYRTEC ALLERGY) 10 MG tablet Take 1 tablet by mouth daily Yes JAYLEN Rowley CNP   loratadine (CLARITIN) 10 MG tablet Take 1 tablet by mouth daily Yes JAYLEN Rowley CNP   vitamin D (CHOLECALCIFEROL) 1000 UNIT TABS tablet Take 1 tablet by mouth daily Yes JAYLEN Rowley CNP   aspirin 81 MG tablet Take 81 mg by mouth daily Yes Historical Provider, MD     Orders Placed This Encounter   Medications    blood glucose test strips (FREESTYLE LITE) strip     Si each by In Vitro route daily As needed.      Dispense:  100 each     Refill:  3    Blood Pressure KIT     Sig: Use to check blood pressure daily     Dispense:  1 kit     Refill:  0    lidocaine (LIDODERM) 5 %     Sig: Place 1 patch onto the skin daily 12 hours on, 12 hours off. Dispense:  30 patch     Refill:  2         Return in 3 months (on 6/23/2022) for DM, HTN, hip and back pain 40 min appt. Patient should call the office immediately with new or ongoing signs or symptoms or worsening, or proceedto the emergency room. No changes in past medical history, past surgical history, social history, or family history were noted during the patient encounter unless specifically listed above. All updates of past medicalhistory, past surgical history, social history, or family history were reviewed personally by me during the office visit. All problems listed in the assessment are stable unless noted otherwise. Medication profilereviewed personally by me during the office visit. Medication side effects and possible impairments from medications were discussed as applicable. Call if pattern of symptoms change or persists for an extended time. This document was prepared by a combination of typing and transcription through a voice recognition software. All medications have the potential for adverse effects. All medications effect each person differently. Please read and review provided information related to medication. If the medication that you have been prescribed has the potential to cause sedation, do not drive or operate car, truck, or heavy machinery until you know how the medication will effect you. If you experience any adverse effects from the medication, please call the office or report to the emergency department. I have recommended that this patient have a pap smear, mammogram, and colon cancer screening but she declines at this time. I have discussed the risks and benefits of this procedure with her. The patient verbalizes understanding. A total of 52 minutes was spent reviewing previous progress notes, discussing HPI, ROS, treatment plan of care, patient education, and completion of progress note.

## 2022-03-24 DIAGNOSIS — E11.9 TYPE 2 DIABETES MELLITUS WITHOUT COMPLICATION, WITHOUT LONG-TERM CURRENT USE OF INSULIN (HCC): ICD-10-CM

## 2022-03-24 LAB
ESTIMATED AVERAGE GLUCOSE: 208.7 MG/DL
HBA1C MFR BLD: 8.9 %

## 2022-03-24 PROCEDURE — 3052F HG A1C>EQUAL 8.0%<EQUAL 9.0%: CPT | Performed by: NURSE PRACTITIONER

## 2022-03-24 RX ORDER — GLIPIZIDE 10 MG/1
10 TABLET, FILM COATED, EXTENDED RELEASE ORAL DAILY
Qty: 90 TABLET | Refills: 1 | Status: SHIPPED | OUTPATIENT
Start: 2022-03-24 | End: 2022-09-15 | Stop reason: SDUPTHER

## 2022-04-06 ENCOUNTER — HOSPITAL ENCOUNTER (OUTPATIENT)
Dept: GENERAL RADIOLOGY | Age: 62
Discharge: HOME | End: 2022-04-06
Payer: MEDICAID

## 2022-04-06 ENCOUNTER — HOSPITAL ENCOUNTER (OUTPATIENT)
Dept: PHYSICAL THERAPY | Age: 62
Setting detail: THERAPIES SERIES
Discharge: HOME | End: 2022-04-06
Payer: MEDICAID

## 2022-04-06 ENCOUNTER — HOSPITAL ENCOUNTER (OUTPATIENT)
Age: 62
Discharge: HOME | End: 2022-04-06
Payer: MEDICAID

## 2022-04-06 DIAGNOSIS — G89.29 CHRONIC LEFT HIP PAIN: ICD-10-CM

## 2022-04-06 DIAGNOSIS — M54.50 CHRONIC BILATERAL LOW BACK PAIN WITHOUT SCIATICA: ICD-10-CM

## 2022-04-06 DIAGNOSIS — G89.29 CHRONIC BILATERAL LOW BACK PAIN WITHOUT SCIATICA: ICD-10-CM

## 2022-04-06 DIAGNOSIS — M54.2 NECK PAIN, ACUTE: ICD-10-CM

## 2022-04-06 DIAGNOSIS — M25.552 CHRONIC LEFT HIP PAIN: ICD-10-CM

## 2022-04-06 PROCEDURE — 97140 MANUAL THERAPY 1/> REGIONS: CPT | Performed by: SPECIALIST

## 2022-04-06 PROCEDURE — 97110 THERAPEUTIC EXERCISES: CPT | Performed by: SPECIALIST

## 2022-04-06 PROCEDURE — 72100 X-RAY EXAM L-S SPINE 2/3 VWS: CPT

## 2022-04-06 PROCEDURE — 97161 PT EVAL LOW COMPLEX 20 MIN: CPT | Performed by: SPECIALIST

## 2022-04-06 PROCEDURE — 72040 X-RAY EXAM NECK SPINE 2-3 VW: CPT

## 2022-04-06 PROCEDURE — 73502 X-RAY EXAM HIP UNI 2-3 VIEWS: CPT

## 2022-04-06 PROCEDURE — G0283 ELEC STIM OTHER THAN WOUND: HCPCS | Performed by: SPECIALIST

## 2022-04-06 NOTE — FLOWSHEET NOTE
[facility],  [place] [address] [address], [address]  Phone: [phone], [phone]    Physical Therapy Treatment Note/ Progress Report:     Date:  2022    Patient Name:  Kathy Pritchett    :  1960  MRN: [id number]  Restrictions/Precautions:    Medical/Treatment Diagnosis Information:      M54.50, G89.29 (ICD-10-CM) - Chronic bilateral low back pain without sciatica   M25.552, G89.29 (ICD-10-CM) - Chronic left hip pain   R29.898 (ICD-10-CM) - Bilateral leg weakness     ·   ·    Insurance/Certification information:    [place] MEDICAID  Physician Information:    JAYLEN Kwan CNP  Has the plan of care been signed (Y/N):        []  Yes  [x]  No     Date of Patient follow up with Physician: NS    Is this a Progress Report:     []  Yes  [x]  No      If Yes:  Date Range for reporting period:  Initial Eval: 2022  Beginnin2022 --- Endin22    Progress report will be due (10 Rx or 30 days whichever is less): 47     Recertification will be due (POC Duration  / 90 days whichever is less): 22      Visit # Insurance Allowable Auth Required   In Person 1  []  Yes     []  No    Tele Health 0  []  Yes     []  No    Total 1       FOTO Score:  (Predicted: )   Date assessed: 2022     Oswestry 34% 22     Latex Allergy:  [x]NO      []YES  Preferred Language for Healthcare:   [x]English       []other:    Pain level:  2-8/10     SUBJECTIVE:  See eval    OBJECTIVE: See eval   Observation:    Test measurements:      RESTRICTIONS/PRECAUTIONS: DM/ HTN    Exercises/Interventions:   Therapeutic Ex (52562)  Therapeutic Activity (39520)  NMR re-education (06257) Sets/Reps Notes/CUES   Bike NV         1K 5x    LTR 10x     Piriformis stretch knees bent 10x                   JUAN 1 min     Prone glut set               Supine add ball squeeze 15x    Supine clamshells 15x                                                      Manual Intervention (97690)     MFR L buttock/ LB 8 min                                  [place] access code: [email or web address]          ESU/ CP prone 10 min    Patient Education         Therapeutic Exercise and NMR EXR  [x] (75096) Provided verbal/tactile cueing for activities related to strengthening, flexibility, endurance, ROM for improvements in LE, proximal hip, and core control with self care, mobility, lifting, ambulation. [x] (99994) Provided verbal/tactile cueing for activities related to improving balance, coordination, kinesthetic sense, posture, motor skill, proprioception to assist with LE, proximal hip, and core control in self-care, mobility, lifting, ambulation and eccentric single leg control. NMR and Therapeutic Activities:    [x] (33131 or 26022) Provided verbal/tactile cueing for activities related to improving balance, coordination, kinesthetic sense, posture, motor skill, proprioception and motor activation to allow for proper function of core, proximal hip and LE with self-care and ADLs and functional mobility.    [x] (13976) Gait Re-education- Provided training and instruction to the patient for proper LE, core and proximal hip recruitment and positioning and eccentric body weight control with ambulation re-education including up and down stairs     Home Exercise Program:    [x] (19096) Reviewed/Progressed HEP activities related to strengthening, flexibility, endurance, ROM of core, proximal hip and LE for functional self-care, mobility, lifting and ambulation/stair navigation   [x] (76679) Reviewed/Progressed HEP activities related to improving balance, coordination, kinesthetic sense, posture, motor skill, proprioception of core, proximal hip and LE for self-care, mobility, lifting, and ambulation/stair navigation      Manual Treatments:  PROM / STM / Oscillations-Mobs:  G-I, II, III, IV (PA's, Inf., Post.)  [x] (05322) Provided manual therapy to mobilize LE, proximal hip and/or LS spine soft tissue/joints for the purpose of modulating pain, promoting relaxation, increasing ROM, reducing/eliminating soft tissue swelling/inflammation/restriction, improving soft tissue extensibility and allowing for proper ROM for normal function with self-care, mobility, lifting and ambulation. Modalities:  ESU/ CP x10min    Charges:  Timed Code Treatment Minutes: 25   Total Treatment Minutes:  55   BWC:  TE TIME:  NMR TIME:  MANUAL TIME:  UNTIMED MINUTES:  Medicare Total:        [x] EVAL (LOW) 27378 (typically 20 minutes face-to-face)  [] EVAL (MOD) 21104 (typically 30 minutes face-to-face)  [] EVAL (HIGH) 72905 (typically 45 minutes face-to-face)  [] RE-EVAL     [x] ALEJNADRO(83133) x  1   [] IONTO  [] NMR (08306) x     [] VASO  [x] Manual (30211) x  1   [] Other:  [] TA x      [] Mech Traction (41769)  [] ES(attended) (78610)      [x] ES (un) (39751):    ASSESSMENT:  See eval    GOALS:   Short Term Goals: To be achieved in: 2 weeks  1. Independent in HEP and progression per patient tolerance, in order to prevent re-injury. [x] Progressing: [] Met: [] Not Met: [] Adjusted   2. Patient will have a decrease in pain to facilitate improvement in movement, function, and ADLs as indicated by Functional Deficits. [x] Progressing: [] Met: [] Not Met: [] Adjusted     Long Term Goals: To be achieved in: 6-8 weeks  1. Oswestry score to be equal or greater to the predicted score to assist with reaching prior level of function. [x] Progressing: [] Met: [] Not Met: [] Adjusted   2. Patient will demonstrate increased AROM to equal the opposite side bilaterally to allow for [facility] as indicated by patients Functional Deficits. [x] Progressing: [] Met: [] Not Met: [] Adjusted   3. Patient will demonstrate an increase in strength to 5/5 to allow for walking as indicated by patients Functional Deficits. [x] Progressing: [] Met: [] Not Met: [] Adjusted   4.  Patient will return to all transfers, work activities, and functional activities without increased symptoms or restriction. [x] Progressing: [] Met: [] Not Met: [] Adjusted   5. Patient will have 0/10 pain with ADL's. [x] Progressing: [] Met: [] Not Met: [] Adjusted   6. Patient stated goal: Stand/walk with min to no limitations   [x] Progressing: [] Met: [] Not Met: [] Adjusted       Overall Progression Towards Functional goals/ Treatment Progress Update:  [] Patient is progressing as expected towards functional goals listed. [] Progression is slowed due to complexities/Impairments listed. [] Progression has been slowed due to co-morbidities. [x] Plan just implemented, too soon to assess goals progression <30days   [] Goals require adjustment due to lack of progress  [] Patient is not progressing as expected and requires additional follow up with physician  [] Other    Prognosis for POC: [x] Good [] Fair  [] Poor    Patient requires continued skilled intervention: [x] Yes  [] No    Treatment/Activity Tolerance:  [x] Patient able to complete treatment  [] Patient limited by fatigue  [] Patient limited by pain    [] Patient limited by other medical complications  [] Other:     Return to Play: (if applicable)   []  Stage 1: Intro to Strength   []  Stage 2: Return to Run and Strength   []  Stage 3: Return to Jump and Strength   []  Stage 4: Dynamic Strength and Agility   []  Stage 5: Sport Specific Training     []  Ready to Return to Play, Meets All Above Stages   [x]  Not Ready for Return to Sports   Comments:                         PLAN: See eval  [] Continue per plan of care [] Alter current plan (see comments above)  [x] Plan of care initiated [] Hold pending MD visit [] Discharge    Electronically signed by:  Patricio Salmon PT    Note: If patient does not return for scheduled/ recommended follow up visits, this note will serve as a discharge from care along with most recent update on progress.

## 2022-04-06 NOTE — PLAN OF CARE
[place] [address] [facility] [address] [facility], [address] [facility] ([facility]), [address] [facility]  Phone: [phone], [phone]                                                       Physical Therapy Certification    Dear   JAYLEN Park CNP,    We had the pleasure of evaluating the following patient for physical therapy services at [address] [place] [address]. A summary of our findings can be found in the initial assessment below. This includes our plan of care. If you have any questions or concerns regarding these findings, please do not hesitate to contact me at the office phone number checked above.   Thank you for the referral.       Physician Signature:_______________________________Date:__________________  By signing above (or electronic signature), therapists plan is approved by physician      Patient: Demetra Severance   : 1960   MRN: [id number]  Referring Physician:   JAYLEN Park CNP      Evaluation Date: 2022      Medical Diagnosis Information:      M54.50, G89.29 (ICD-10-CM) - Chronic bilateral low back pain without sciatica   M25.552, G89.29 (ICD-10-CM) - Chronic left hip pain   R29.898 (ICD-10-CM) - Bilateral leg weakness                                                  Insurance information:   [id number]     Precautions/ Contra-indications/Relevant Medical History: DM, HTM     C-SSRS Triggered by Intake questionnaire (Past 2 wk assessment):   [x] No, Questionnaire did not trigger screening.   [] Yes, Patient intake triggered further evaluation      [] C-SSRS Screening completed  [] PCP notified via Plan of Care  [] Emergency services notified     Latex Allergy:  [x]NO      []YES   Preferred Language for Healthcare:   [x]English       []other:     SUBJECTIVE: Patient states insidious onset 4-5 years LBP, pain while she was working now she is retired    FOTO Score:   FOTO Predicted Score:   Oswestry 34%    Pain Scale: 2-8/10  Easing factors: morning  Provocative factors: walk stand stairs      Type: []Constant   [x]Intermittent  []Radiating []Localized []other:     Numbness/Tingling:none    Functional Limitations/Impairments: [x]Sitting [x]Standing [x]Walking    []Squatting/bending  [x]Stairs           []ADL's  [x]Transfers []Sports/Recreation []Other:    Occupation/School: retired    Living Status/Prior Level of Function: Independent with ADLs and IADLs,     OBJECTIVE:     Standing Exam Normal Abnormal N/A Comments   Toe walk         Heel Walk       Side bending       Pelvic Height       Fwd Bend- (aberrant juttering or innominate mvmt)       Extension       Trendelenburg       Kemps/Quadrant       Stork       SLS/SLS w rotation                     Seated Exam Normal Abnormal N/A Comments   Pelvic Height       Seated Rotation       Seated flexion       B hip IR                     Supine Exam Normal Abnormal N/A Comments   Hip flexion       Abduction       ER       IR       HEIDI/Abdullahi  x     Hip scour       SLR  x     Crossed SLR  x     Supine to sit       SI distraction/compression       Hip thrust                     Prone Exam Normal Abnormal N/A Comments   Prone knee bend       Prone hip IR       B Achilles reflex/Pheasant       PA/Spring       Prone Instability test       Sacral Spring/thrust                       ROM LEFT RIGHT Comments   Lumbar Flex      Lumbar Ext      Side Bend      Rotation                    ROM LEFT RIGHT Comments   Hip Flexion      Hip Abd      Hip ER      Hip IR      Hip Extension      Knee Ext      Knee Flex      Hamstring Flex      Piriformis                    Strength LEFT RIGHT Comments   Multifidus      Transverse Ab      Hip Flexors 5- 5-    Hip Abductors      Hip Extensors      knee 5- 5-    DF 5- 5-       Myotomes Normal Abnormal Comments   Hip flexion (L1-L2)      Knee extension (L2-L4)      Dorsiflexion (L4-L5)      Great Toe Ext (L5)      Ankle Eversion (S1-S2) Ankle PF(S1-S2)          Dermatomes Normal Abnormal Comments   inguinal area (L1)       anterior mid-thigh (L2)      distal ant thigh/med knee (L3)      medial lower leg and foot (L4)      lateral lower leg and foot (L5)      posterior calf (S1)      medial calcaneus (S2)          Neural dynamic tension testing Normal Abnormal Comments   Slump Test  - Degree of knee flexion:       SLR       0-30      30-70      Femoral nerve (L2-4)        Reflexes Normal Abnormal Comments   S1-2 Seated achilles      S1-2 Prone knee bend      L3-4 Patellar tendon      C5-6 Biceps      C6 Brachioradialis      C7-8 Triceps      Clonus      Babinski      Rubio's        Joint mobility:    [x]Normal    []Hypo   []Hyper    Palpation: tenderness L buttock/ LB    Functional Mobility/Transfers: painful sit to stand    Posture: WFL    Bandages/Dressings/Incisions: intact    Gait (include devices/WB status): antalgic                     [x] Patient history, allergies, meds reviewed. Medical chart reviewed. See intake form. Review Of Systems (ROS):  [x]Performed Review of systems (Integumentary, CardioPulmonary, Neurological) by intake and observation. Intake form has been scanned into medical record. Patient has been instructed to contact their primary care physician regarding ROS issues if not already being addressed at this time.     Co-morbidities/Complexities (which will affect course of rehabilitation):   [x]None           Arthritic conditions   []Rheumatoid arthritis (M05.9)  [x]Osteoarthritis (M19.91)   Cardiovascular conditions   [x]Hypertension (I10)  []Hyperlipidemia (E78.5)  []Angina pectoris (I20)  []Atherosclerosis (I70)   Musculoskeletal conditions   []Disc pathology   []Congenital spine pathologies   []Prior surgical intervention  []Osteoporosis (M81.8)  []Osteopenia (M85.8)   Endocrine conditions   []Hypothyroid (E03.9)  []Hyperthyroid Gastrointestinal conditions   []Constipation (B49.15)   Metabolic conditions   []Morbid obesity (E66.01)  [x]Diabetes type 1(E10.65) or 2 (E11.65)   []Neuropathy (G60.9)     Pulmonary conditions   []Asthma (J45)  []Coughing   []COPD (J44.9)   Psychological Disorders  []Anxiety (F41.9)  []Depression (F32.9)   []Other:   []Other:          Barriers to/and or personal factors that will affect rehab potential:              []Age  []Sex              []Motivation/Lack of Motivation                        [x]Co-Morbidities              []Cognitive Function, education/learning barriers              []Environmental, home barriers              []profession/work barriers  []past PT/medical experience  []other:  Justification:     Falls Risk Assessment (30 days):   [x] Falls Risk assessed and no intervention required.   [] Falls Risk assessed and Patient requires intervention due to being higher risk   TUG score (>12s at risk):     [] Falls education provided, including       G-Codes:       ASSESSMENT:   Functional Impairments:     []Noted lumbar/proximal hip hypomobility   []Noted lumbosacral and/or generalized hypermobility   [x]Decreased Lumbosacral/hip/LE functional ROM   [x]Decreased core/proximal hip strength and neuromuscular control    [x]Decreased LE functional strength    []Abnormal reflexes/sensation/myotomal/dermatomal deficits  []Reduced balance/proprioceptive control    []other:      Functional Activity Limitations (from functional questionnaire and intake)   [x]Reduced ability to tolerate prolonged functional positions   [x]Reduced ability or difficulty with changes of positions or transfers between positions   [x]Reduced ability to maintain good posture and demonstrate good body mechanics with sitting, bending, and lifting   [x]Reduced ability to sleep   [] Reduced ability or tolerance with driving and/or computer work   [x]Reduced ability to perform lifting, reaching, carrying tasks   [x]Reduced ability to squat   []Reduced ability to forward bend   [x]Reduced ability to ambulate prolonged functional periods/distances/surfaces   [x]Reduced ability to ascend/descend stairs   []other:       Participation Restrictions   [x]Reduced participation in self care activities   [x]Reduced participation in home management activities   []Reduced participation in work activities   []Reduced participation in social activities. []Reduced participation in sport/recreational activities. Classification:   []Signs/symptoms consistent with Lumbar instability/stabilization subgroup. []Signs/symptoms consistent with Lumbar mobilization/manipulation subgroup, myotomes and dermatomes intact. Meets manipulation criteria. []Signs/symptoms consistent with Lumbar direction specific/centralization subgroup   []Signs/symptoms consistent with Lumbar traction subgroup     []Signs/symptoms consistent with lumbar facet dysfunction   [x]Signs/symptoms consistent with lumbar stenosis type dysfunction   []Signs/symptoms consistent with nerve root involvement including myotome & dermatome dysfunction   []Signs/symptoms consistent with post-surgical status including: decreased ROM, strength and function.    []signs/symptoms consistent with pathology which may benefit from Dry needling     [x]other:   Lumbar/hipOA    Prognosis/Rehab Potential:      []Excellent   [x]Good    []Fair   []Poor    Tolerance of evaluation/treatment:    []Excellent   [x]Good    []Fair   []Poor    Physical Therapy Evaluation Complexity Justification   [x] A history of present problem with:  [] no personal factors and/or comorbidities that impact the plan of care;  [x]1-2 personal factors and/or comorbidities that impact the plan of care  []3 personal factors and/or comorbidities that impact the plan of care  [x] An examination of body systems using standardized tests and measures addressing any of the following: body structures and functions (impairments), activity limitations, and/or participation restrictions;:  [x] a total of 1-2 or more elements   [] a total of 3 or more elements   [] a total of 4 or more elements   [x] A clinical presentation with:  [x] stable and/or uncomplicated characteristics   [] evolving clinical presentation with changing characteristics  [] unstable and unpredictable characteristics;   [x] Clinical decision making of [x] low, [] moderate, [] high complexity using standardized patient assessment instrument and/or measurable assessment of functional outcome. [x] EVAL (LOW) 07643 (typically 20 minutes face-to-face)  [] EVAL (MOD) 62689 (typically 30 minutes face-to-face)  [] EVAL (HIGH) 85425 (typically 45 minutes face-to-face)  [] RE-EVAL     PLAN: Begin PT focusing on: proximal hip mobilizations, LB mobs, LB core activation, proximal hip activation, and HEP    Frequency/Duration:  2 days per week for 6-8 weeks:  Interventions:  [x]  Therapeutic exercise including: strength training, ROM, for the lower extremity and core   [x]  NMR activation and proprioception for LE, Glutes and Core   [x]  Manual therapy as indicated for LE, Hip and spine to include: Dry Needling/IASTM, STM, PROM, Gr I-IV mobilizations, manipulation. [x] Modalities as needed that may include: thermal agents, E-stim, Biofeedback, US, iontophoresis as indicated  [x] Patient education on joint protection, postural re-education, activity modification, progression of HEP. HEP instruction: Refer to [address] [place] [address] access code and exercises on the 1st visit treatment note    GOALS:    Short Term Goals: To be achieved in: 2 weeks  1. Independent in HEP and progression per patient tolerance, in order to prevent re-injury. [x] Progressing: [] Met: [] Not Met: [] Adjusted   2. Patient will have a decrease in pain to facilitate improvement in movement, function, and ADLs as indicated by Functional Deficits. [x] Progressing: [] Met: [] Not Met: [] Adjusted     Long Term Goals: To be achieved in: 6-8 weeks  1.  Oswestry score to be equal or greater to the predicted score to assist with reaching prior level of function. [x] Progressing: [] Met: [] Not Met: [] Adjusted   2. Patient will demonstrate increased AROM to equal the opposite side bilaterally to allow [facility] as indicated by patients Functional Deficits. [x] Progressing: [] Met: [] Not Met: [] Adjusted   3. Patient will demonstrate an increase in strength to 5/5 to allow for walking as indicated by patients Functional Deficits. [x] Progressing: [] Met: [] Not Met: [] Adjusted   4. Patient will return to all transfers, work activities, and functional activities without increased symptoms or restriction. [x] Progressing: [] Met: [] Not Met: [] Adjusted   5. Patient will have 0/10 pain with ADL's. [x] Progressing: [] Met: [] Not Met: [] Adjusted   6.  Patient stated goal: Stand/walk with min to no limitations   [x] Progressing: [] Met: [] Not Met: [] Adjusted     Electronically signed by:  Mike Berger PT

## 2022-04-08 ENCOUNTER — HOSPITAL ENCOUNTER (OUTPATIENT)
Dept: PHYSICAL THERAPY | Age: 62
Setting detail: THERAPIES SERIES
Discharge: HOME | End: 2022-04-08
Payer: MEDICAID

## 2022-04-08 PROCEDURE — G0283 ELEC STIM OTHER THAN WOUND: HCPCS | Performed by: PHYSICAL THERAPY ASSISTANT

## 2022-04-08 PROCEDURE — 97110 THERAPEUTIC EXERCISES: CPT | Performed by: PHYSICAL THERAPY ASSISTANT

## 2022-04-08 PROCEDURE — 97140 MANUAL THERAPY 1/> REGIONS: CPT | Performed by: PHYSICAL THERAPY ASSISTANT

## 2022-04-08 NOTE — FLOWSHEET NOTE
[facility], [address] [facility] [place], [address]  Phone: [phone], [phone]    Physical Therapy Treatment Note/ Progress Report:     Date:  2022    Patient Name:  Lluy Kim    :  1960  MRN: [id number]  Restrictions/Precautions:    Medical/Treatment Diagnosis Information:      M54.50, G89.29 (ICD-10-CM) - Chronic bilateral low back pain without sciatica   M25.552, G89.29 (ICD-10-CM) - Chronic left hip pain   R29.898 (ICD-10-CM) - Bilateral leg weakness     ·      Insurance/Certification information:    [place] MEDICAID  Physician Information:    JAYLEN López CNP  Has the plan of care been signed (Y/N):        []  Yes  [x]  No     Date of Patient follow up with Physician: NS    Is this a Progress Report:     []  Yes  [x]  No      If Yes:  Date Range for reporting period:  Initial Eval: 2022  Beginnin2022 --- Endin22    Progress report will be due (10 Rx or 30 days whichever is less): 18     Recertification will be due (POC Duration  / 90 days whichever is less): 22      Visit # Insurance Allowable Auth Required   In Person 2  []  Yes     []  No    Tele Health 0  []  Yes     []  No    Total 2       FOTO Score:  (Predicted: )   Date assessed: 2022     Oswestry 34% 22     Latex Allergy:  [x]NO      []YES  Preferred Language for Healthcare:   [x]English       []other:    Pain level:  2-8/10     SUBJECTIVE:  Was a little sore after 1st visit but did OK with HEP. Today she is sore in the left hip primarily.      OBJECTIVE: See eval   Observation:    Test measurements:      RESTRICTIONS/PRECAUTIONS: DM/ HTN    Exercises/Interventions:   Therapeutic Ex (04515)  Therapeutic Activity (24213)  NMR re-education (52171) Sets/Reps Notes/CUES   Bike 5' L3              Ab set  5\" x15         1K 5x    LTR 10x     Piriformis stretch knees bent 10x                   JUAN 1 min     Prone glut set               Supine add ball squeeze 15x    Supine clamshells 20x ea 3 ways orange     Supine Marches X20     Supine SLR  2x10 R, L     Bridges  X20 5\" hold    Bent knee drop out  5\" x20  R, L                                   Manual Intervention (01337)     MFR L buttock/ LB 8 min    LLE traction  5'    BLE Manual traction  5'                        Medbridge access code: [email or web address]          ESU/ CP prone 10 min    Patient Education         Therapeutic Exercise and NMR EXR  [x] (23193) Provided verbal/tactile cueing for activities related to strengthening, flexibility, endurance, ROM for improvements in LE, proximal hip, and core control with self care, mobility, lifting, ambulation. [x] (82889) Provided verbal/tactile cueing for activities related to improving balance, coordination, kinesthetic sense, posture, motor skill, proprioception to assist with LE, proximal hip, and core control in self-care, mobility, lifting, ambulation and eccentric single leg control. NMR and Therapeutic Activities:    [x] (91842 or 88124) Provided verbal/tactile cueing for activities related to improving balance, coordination, kinesthetic sense, posture, motor skill, proprioception and motor activation to allow for proper function of core, proximal hip and LE with self-care and ADLs and functional mobility.    [x] (43626) Gait Re-education- Provided training and instruction to the patient for proper LE, core and proximal hip recruitment and positioning and eccentric body weight control with ambulation re-education including up and down stairs     Home Exercise Program:    [x] (98933) Reviewed/Progressed HEP activities related to strengthening, flexibility, endurance, ROM of core, proximal hip and LE for functional self-care, mobility, lifting and ambulation/stair navigation   [x] (37754) Reviewed/Progressed HEP activities related to improving balance, coordination, kinesthetic sense, posture, motor skill, proprioception of core, proximal hip and LE for self-care, mobility, lifting, and ambulation/stair navigation      Manual Treatments:  PROM / STM / Oscillations-Mobs:  G-I, II, III, IV (PA's, Inf., Post.)  [x] (23166) Provided manual therapy to mobilize LE, proximal hip and/or LS spine soft tissue/joints for the purpose of modulating pain, promoting relaxation, increasing ROM, reducing/eliminating soft tissue swelling/inflammation/restriction, improving soft tissue extensibility and allowing for proper ROM for normal function with self-care, mobility, lifting and ambulation. Modalities:  ESU/ CP x10min    Charges:  Timed Code Treatment Minutes: 60   Total Treatment Minutes:  60   BWC:  TE TIME:  NMR TIME:  MANUAL TIME:  UNTIMED MINUTES:  Medicare Total:        [] EVAL (LOW) 75451 (typically 20 minutes face-to-face)  [] EVAL (MOD) 48520 (typically 30 minutes face-to-face)  [] EVAL (HIGH) 21778 (typically 45 minutes face-to-face)  [] RE-EVAL     [x] II(91424) x  2   [] IONTO  [] NMR (70197) x     [] VASO  [x] Manual (04779) x  1   [] Other:  [] TA x      [] Mech Traction (07748)  [] ES(attended) (40373)      [x] ES (un) (40585):    ASSESSMENT:  See eval    GOALS:   Short Term Goals: To be achieved in: 2 weeks  1. Independent in HEP and progression per patient tolerance, in order to prevent re-injury. [x] Progressing: [] Met: [] Not Met: [] Adjusted   2. Patient will have a decrease in pain to facilitate improvement in movement, function, and ADLs as indicated by Functional Deficits. [x] Progressing: [] Met: [] Not Met: [] Adjusted     Long Term Goals: To be achieved in: 6-8 weeks  1. Oswestry score to be equal or greater to the predicted score to assist with reaching prior level of function. [x] Progressing: [] Met: [] Not Met: [] Adjusted   2. Patient will demonstrate increased AROM to equal the opposite side bilaterally to allow for [facility] as indicated by patients Functional Deficits. [x] Progressing: [] Met: [] Not Met: [] Adjusted   3.  Patient will demonstrate an increase in strength to 5/5 to allow for walking as indicated by patients Functional Deficits. [x] Progressing: [] Met: [] Not Met: [] Adjusted   4. Patient will return to all transfers, work activities, and functional activities without increased symptoms or restriction. [x] Progressing: [] Met: [] Not Met: [] Adjusted   5. Patient will have 0/10 pain with ADL's. [x] Progressing: [] Met: [] Not Met: [] Adjusted   6. Patient stated goal: Stand/walk with min to no limitations   [x] Progressing: [] Met: [] Not Met: [] Adjusted       Overall Progression Towards Functional goals/ Treatment Progress Update:  [] Patient is progressing as expected towards functional goals listed. [x] Progression is slowed due to complexities/Impairments listed. [] Progression has been slowed due to co-morbidities.   [] Plan just implemented, too soon to assess goals progression <30days   [] Goals require adjustment due to lack of progress  [] Patient is not progressing as expected and requires additional follow up with physician  [] Other    Prognosis for POC: [x] Good [] Fair  [] Poor    Patient requires continued skilled intervention: [x] Yes  [] No    Treatment/Activity Tolerance:  [x] Patient able to complete treatment  [] Patient limited by fatigue  [] Patient limited by pain    [] Patient limited by other medical complications  [] Other:     Return to Play: (if applicable)   []  Stage 1: Intro to Strength   []  Stage 2: Return to Run and Strength   []  Stage 3: Return to Jump and Strength   []  Stage 4: Dynamic Strength and Agility   []  Stage 5: Sport Specific Training     []  Ready to Return to Play, Meets All Above Stages   [x]  Not Ready for Return to Sports   Comments:                         PLAN: See eval  [x] Continue per plan of care [] Alter current plan (see comments above)  [] Plan of care initiated [] Hold pending MD visit [] Discharge    Electronically signed by:  Lucien Jara PTA    Note: If patient does not return for scheduled/ recommended follow up visits, this note will serve as a discharge from care along with most recent update on progress.

## 2022-04-13 ENCOUNTER — HOSPITAL ENCOUNTER (OUTPATIENT)
Dept: PHYSICAL THERAPY | Age: 62
Setting detail: THERAPIES SERIES
Discharge: HOME | End: 2022-04-13
Payer: MEDICAID

## 2022-04-13 PROCEDURE — 97110 THERAPEUTIC EXERCISES: CPT | Performed by: PHYSICAL THERAPY ASSISTANT

## 2022-04-13 PROCEDURE — 97140 MANUAL THERAPY 1/> REGIONS: CPT | Performed by: PHYSICAL THERAPY ASSISTANT

## 2022-04-13 PROCEDURE — 97530 THERAPEUTIC ACTIVITIES: CPT | Performed by: PHYSICAL THERAPY ASSISTANT

## 2022-04-13 NOTE — FLOWSHEET NOTE
[facility], [address] [place] [address] [place], [address]  Phone: [phone], [phone]    Physical Therapy Treatment Note/ Progress Report:     Date:  2022    Patient Name:  Reid Meckel    :  1960  MRN: [id number]  Restrictions/Precautions:    Medical/Treatment Diagnosis Information:      M54.50, G89.29 (ICD-10-CM) - Chronic bilateral low back pain without sciatica   M25.552, G89.29 (ICD-10-CM) - Chronic left hip pain   R29.898 (ICD-10-CM) - Bilateral leg weakness     ·      Insurance/Certification information:    [place] MEDICAID  Physician Information:    JAYLEN Jackson CNP  Has the plan of care been signed (Y/N):        []  Yes  [x]  No     Date of Patient follow up with Physician: NS    Is this a Progress Report:     []  Yes  [x]  No      If Yes:  Date Range for reporting period:  Initial Eval: 2022  Beginnin2022 --- Endin22    Progress report will be due (10 Rx or 30 days whichever is less): 06     Recertification will be due (POC Duration  / 90 days whichever is less): 22      Visit # Insurance Allowable Auth Required   In Person 3  []  Yes     []  No    [facility] 0  []  Yes     []  No    Total 3       FOTO Score:  (Predicted: )   Date assessed: 2022     Oswestry 34% 22     Latex Allergy:  [x]NO      []YES  Preferred Language for Healthcare:   [x]English       []other:    Pain level:  2/10     SUBJECTIVE:  States that she was feeling good and working on her HEP. Monday she fell at her house after tripping over something and fell right on her knee on her left leg and was unable to walk Monday or Tuesday. Today she is feeling much better. Pain today is minimal and located mostly in the left hip/glut area.       OBJECTIVE: See eval   Observation:    Test measurements:      RESTRICTIONS/PRECAUTIONS: DM/ HTN    Exercises/Interventions:   Therapeutic Ex (92250)  Therapeutic Activity (76608)  NMR re-education (81320) Sets/Reps Notes/CUES   Bike 5' L3         HR / Slant Board  X20 / 3x30\"     Marches  x20              Ab set  5\" x15         1K 5x    LTR 10x     Piriformis stretch knees bent 10x                   JUAN 1 min     Prone glut set               Supine add ball squeeze 15x    Supine clamshells 20x ea 3 ways orange     Supine Marches X20     Supine SLR  2x10 R, L     Bridges  X20 5\" hold    Bent knee drop out  5\" x20  R, L                                   Manual Intervention (86020)     MFR L buttock/ LB 8 min    LLE traction  5'    BLE Manual traction  5'                        Medbridge access code: [email or web address]          ESU/ CP prone 10 min    Patient Education         Therapeutic Exercise and NMR EXR  [x] (52667) Provided verbal/tactile cueing for activities related to strengthening, flexibility, endurance, ROM for improvements in LE, proximal hip, and core control with self care, mobility, lifting, ambulation. [x] (75720) Provided verbal/tactile cueing for activities related to improving balance, coordination, kinesthetic sense, posture, motor skill, proprioception to assist with LE, proximal hip, and core control in self-care, mobility, lifting, ambulation and eccentric single leg control. NMR and Therapeutic Activities:    [x] (22749 or 09946) Provided verbal/tactile cueing for activities related to improving balance, coordination, kinesthetic sense, posture, motor skill, proprioception and motor activation to allow for proper function of core, proximal hip and LE with self-care and ADLs and functional mobility.    [x] (78602) Gait Re-education- Provided training and instruction to the patient for proper LE, core and proximal hip recruitment and positioning and eccentric body weight control with ambulation re-education including up and down stairs     Home Exercise Program:    [x] (32388) Reviewed/Progressed HEP activities related to strengthening, flexibility, endurance, ROM of core, proximal hip and LE for functional self-care, mobility, lifting and ambulation/stair navigation   [x] (72503) Reviewed/Progressed HEP activities related to improving balance, coordination, kinesthetic sense, posture, motor skill, proprioception of core, proximal hip and LE for self-care, mobility, lifting, and ambulation/stair navigation      Manual Treatments:  PROM / STM / Oscillations-Mobs:  G-I, II, III, IV (PA's, Inf., Post.)  [x] (11911) Provided manual therapy to mobilize LE, proximal hip and/or LS spine soft tissue/joints for the purpose of modulating pain, promoting relaxation, increasing ROM, reducing/eliminating soft tissue swelling/inflammation/restriction, improving soft tissue extensibility and allowing for proper ROM for normal function with self-care, mobility, lifting and ambulation. Modalities:  ESU/ CP x10min    Charges:  Timed Code Treatment Minutes: 60   Total Treatment Minutes:  60   BWC:  TE TIME:  NMR TIME:  MANUAL TIME:  UNTIMED MINUTES:  Medicare Total:        [] EVAL (LOW) 49731 (typically 20 minutes face-to-face)  [] EVAL (MOD) 17748 (typically 30 minutes face-to-face)  [] EVAL (HIGH) 13508 (typically 45 minutes face-to-face)  [] RE-EVAL     [x] MM(71326) x  2   [] IONTO  [] NMR (58860) x     [] VASO  [x] Manual (68279) x  1   [] Other:  [x] TA x 1     [] Mech Traction (87240)  [] ES(attended) (67241)      [] ES (un) (75502):    ASSESSMENT:  See eval    GOALS:   Short Term Goals: To be achieved in: 2 weeks  1. Independent in HEP and progression per patient tolerance, in order to prevent re-injury. [x] Progressing: [] Met: [] Not Met: [] Adjusted   2. Patient will have a decrease in pain to facilitate improvement in movement, function, and ADLs as indicated by Functional Deficits. [x] Progressing: [] Met: [] Not Met: [] Adjusted     Long Term Goals: To be achieved in: 6-8 weeks  1. Oswestry score to be equal or greater to the predicted score to assist with reaching prior level of function. [x] Progressing: [] Met: [] Not Met: [] Adjusted   2. Patient will demonstrate increased AROM to equal the opposite side bilaterally to allow for [facility] as indicated by patients Functional Deficits. [x] Progressing: [] Met: [] Not Met: [] Adjusted   3. Patient will demonstrate an increase in strength to 5/5 to allow for walking as indicated by patients Functional Deficits. [x] Progressing: [] Met: [] Not Met: [] Adjusted   4. Patient will return to all transfers, work activities, and functional activities without increased symptoms or restriction. [x] Progressing: [] Met: [] Not Met: [] Adjusted   5. Patient will have 0/10 pain with ADL's. [x] Progressing: [] Met: [] Not Met: [] Adjusted   6. Patient stated goal: Stand/walk with min to no limitations   [x] Progressing: [] Met: [] Not Met: [] Adjusted       Overall Progression Towards Functional goals/ Treatment Progress Update:  [] Patient is progressing as expected towards functional goals listed. [x] Progression is slowed due to complexities/Impairments listed. [] Progression has been slowed due to co-morbidities.   [] Plan just implemented, too soon to assess goals progression <30days   [] Goals require adjustment due to lack of progress  [] Patient is not progressing as expected and requires additional follow up with physician  [] Other    Prognosis for POC: [x] Good [] Fair  [] Poor    Patient requires continued skilled intervention: [x] Yes  [] No    Treatment/Activity Tolerance:  [x] Patient able to complete treatment  [] Patient limited by fatigue  [] Patient limited by pain    [] Patient limited by other medical complications  [] Other:     Return to Play: (if applicable)   []  Stage 1: Intro to Strength   []  Stage 2: Return to Run and Strength   []  Stage 3: Return to Jump and Strength   []  Stage 4: Dynamic Strength and Agility   []  Stage 5: Sport Specific Training     []  Ready to Return to Play, Meets All Above Stages   [x]  Not Ready for Return to Sports   Comments:                         PLAN: See eval  [x] Continue per plan of care [] Alter current plan (see comments above)  [] Plan of care initiated [] Hold pending MD visit [] Discharge    Electronically signed by:  Beba Chang PTA    Note: If patient does not return for scheduled/ recommended follow up visits, this note will serve as a discharge from care along with most recent update on progress.

## 2022-04-21 ENCOUNTER — HOSPITAL ENCOUNTER (OUTPATIENT)
Dept: PHYSICAL THERAPY | Age: 62
Setting detail: THERAPIES SERIES
Discharge: HOME | End: 2022-04-21
Payer: MEDICAID

## 2022-04-21 PROCEDURE — 97530 THERAPEUTIC ACTIVITIES: CPT | Performed by: PHYSICAL THERAPY ASSISTANT

## 2022-04-21 PROCEDURE — G0283 ELEC STIM OTHER THAN WOUND: HCPCS | Performed by: PHYSICAL THERAPY ASSISTANT

## 2022-04-21 PROCEDURE — 97110 THERAPEUTIC EXERCISES: CPT | Performed by: PHYSICAL THERAPY ASSISTANT

## 2022-04-21 PROCEDURE — 97140 MANUAL THERAPY 1/> REGIONS: CPT | Performed by: PHYSICAL THERAPY ASSISTANT

## 2022-04-21 NOTE — FLOWSHEET NOTE
Formerly [facility], [address] [place] [address] Frankie Garcia, [address]  Phone: [phone], [phone]    Physical Therapy Treatment Note/ Progress Report:     Date:  2022    Patient Name:  Tawny Ramires    :  1960  MRN: [id number]  Restrictions/Precautions:    Medical/Treatment Diagnosis Information:      M54.50, G89.29 (ICD-10-CM) - Chronic bilateral low back pain without sciatica   M25.552, G89.29 (ICD-10-CM) - Chronic left hip pain   R29.898 (ICD-10-CM) - Bilateral leg weakness     ·      Insurance/Certification information:    [place] MEDICAID  Physician Information:    JAYLEN Ann CNP  Has the plan of care been signed (Y/N):        []  Yes  [x]  No     Date of Patient follow up with Physician: NS    Is this a Progress Report:     []  Yes  [x]  No      If Yes:  Date Range for reporting period:  Initial Eval: 2022  Beginnin2022 --- Endin22    Progress report will be due (10 Rx or 30 days whichever is less): 49     Recertification will be due (POC Duration  / 90 days whichever is less): 22      Visit # Insurance Allowable Auth Required   In Person 4  []  Yes     []  No    Tele Health 0  []  Yes     []  No    Total 4       FOTO Score:  (Predicted: )   Date assessed: 2022     Oswestry 34% 22     Latex Allergy:  [x]NO      []YES  Preferred Language for Healthcare:   [x]English       []other:    Pain level:  8/10     SUBJECTIVE:  Patient continues to struggle with pain on rainy days.  \"It will be better when the rain goes away\"    OBJECTIVE: See eval   Observation:    Test measurements:      RESTRICTIONS/PRECAUTIONS: DM/ HTN    Exercises/Interventions:   Therapeutic Ex (00300)  Therapeutic Activity (79883)  NMR re-education (92234) Sets/Reps Notes/CUES   Bike 5' L3         HR / Slant Board  x30 / 3x30\"     March  x20    Standing abd x20 R, L    FSU 4\" x20 R, L         Ab set  5\" x15         1K 5x    LTR 10\" x 10 Piriformis stretch knees bent 10x10\"                   JUAN 1 min     Prone glut set               Supine add ball squeeze 5\" x20    Supine clamshells 20x ea 3 ways green    Supine Marches X20     Supine SLR  2x10 R, L     Bridges  x20 5\" hold    Bent knee drop out  5\" x20  R, L                                   Manual Intervention (40343)     MFR L buttock/ LB 8 min    LLE traction  5'    BLE Manual traction  5'                        Medbridge access code: [email or web address]          ESU/ CP prone 10 min    Patient Education         Therapeutic Exercise and NMR EXR  [x] (01281) Provided verbal/tactile cueing for activities related to strengthening, flexibility, endurance, ROM for improvements in LE, proximal hip, and core control with self care, mobility, lifting, ambulation. [x] (22385) Provided verbal/tactile cueing for activities related to improving balance, coordination, kinesthetic sense, posture, motor skill, proprioception to assist with LE, proximal hip, and core control in self-care, mobility, lifting, ambulation and eccentric single leg control. NMR and Therapeutic Activities:    [x] (29666 or 98241) Provided verbal/tactile cueing for activities related to improving balance, coordination, kinesthetic sense, posture, motor skill, proprioception and motor activation to allow for proper function of core, proximal hip and LE with self-care and ADLs and functional mobility.    [x] (70336) Gait Re-education- Provided training and instruction to the patient for proper LE, core and proximal hip recruitment and positioning and eccentric body weight control with ambulation re-education including up and down stairs     Home Exercise Program:    [x] (87199) Reviewed/Progressed HEP activities related to strengthening, flexibility, endurance, ROM of core, proximal hip and LE for functional self-care, mobility, lifting and ambulation/stair navigation   [x] (33459) Reviewed/Progressed HEP activities related to improving balance, coordination, kinesthetic sense, posture, motor skill, proprioception of core, proximal hip and LE for self-care, mobility, lifting, and ambulation/stair navigation      Manual Treatments:  PROM / STM / Oscillations-Mobs:  G-I, II, III, IV (PA's, Inf., Post.)  [x] (00362) Provided manual therapy to mobilize LE, proximal hip and/or LS spine soft tissue/joints for the purpose of modulating pain, promoting relaxation, increasing ROM, reducing/eliminating soft tissue swelling/inflammation/restriction, improving soft tissue extensibility and allowing for proper ROM for normal function with self-care, mobility, lifting and ambulation. Modalities:  ESU/ CP x10min    Charges:  Timed Code Treatment Minutes: 60   Total Treatment Minutes:  60   BWC:  TE TIME:  NMR TIME:  MANUAL TIME:  UNTIMED MINUTES:  Medicare Total:        [] EVAL (LOW) 52331 (typically 20 minutes face-to-face)  [] EVAL (MOD) 36925 (typically 30 minutes face-to-face)  [] EVAL (HIGH) 32819 (typically 45 minutes face-to-face)  [] RE-EVAL     [x] FA(04201) x  1   [] IONTO  [] NMR (78033) x     [] VASO  [x] Manual (59161) x  1   [] Other:  [x] TA x 1     [] Mech Traction (33895)  [] ES(attended) (59784)      [x] ES (un) (86024):    ASSESSMENT:  See eval    GOALS:   Short Term Goals: To be achieved in: 2 weeks  1. Independent in HEP and progression per patient tolerance, in order to prevent re-injury. [x] Progressing: [] Met: [] Not Met: [] Adjusted   2. Patient will have a decrease in pain to facilitate improvement in movement, function, and ADLs as indicated by Functional Deficits. [x] Progressing: [] Met: [] Not Met: [] Adjusted     Long Term Goals: To be achieved in: 6-8 weeks  1. Oswestry score to be equal or greater to the predicted score to assist with reaching prior level of function. [x] Progressing: [] Met: [] Not Met: [] Adjusted   2.  Patient will demonstrate increased AROM to equal the opposite side bilaterally to allow for [facility] as indicated by patients Functional Deficits. [x] Progressing: [] Met: [] Not Met: [] Adjusted   3. Patient will demonstrate an increase in strength to 5/5 to allow for walking as indicated by patients Functional Deficits. [x] Progressing: [] Met: [] Not Met: [] Adjusted   4. Patient will return to all transfers, work activities, and functional activities without increased symptoms or restriction. [x] Progressing: [] Met: [] Not Met: [] Adjusted   5. Patient will have 0/10 pain with ADL's. [x] Progressing: [] Met: [] Not Met: [] Adjusted   6. Patient stated goal: Stand/walk with min to no limitations   [x] Progressing: [] Met: [] Not Met: [] Adjusted       Overall Progression Towards Functional goals/ Treatment Progress Update:  [] Patient is progressing as expected towards functional goals listed. [x] Progression is slowed due to complexities/Impairments listed. [] Progression has been slowed due to co-morbidities.   [] Plan just implemented, too soon to assess goals progression <30days   [] Goals require adjustment due to lack of progress  [] Patient is not progressing as expected and requires additional follow up with physician  [] Other    Prognosis for POC: [x] Good [] Fair  [] Poor    Patient requires continued skilled intervention: [x] Yes  [] No    Treatment/Activity Tolerance:  [x] Patient able to complete treatment  [] Patient limited by fatigue  [] Patient limited by pain    [] Patient limited by other medical complications  [] Other:     Return to Play: (if applicable)   []  Stage 1: Intro to Strength   []  Stage 2: Return to Run and Strength   []  Stage 3: Return to Jump and Strength   []  Stage 4: Dynamic Strength and Agility   []  Stage 5: Sport Specific Training     []  Ready to Return to Play, Meets All Above Stages   [x]  Not Ready for Return to Sports   Comments:                         PLAN: See eval  [x] Continue per plan of care [] Alter current plan (see comments above)  [] Plan of care initiated [] Hold pending MD visit [] Discharge    Electronically signed by:  Tania Mayers PTA    Note: If patient does not return for scheduled/ recommended follow up visits, this note will serve as a discharge from care along with most recent update on progress.

## 2022-04-26 ENCOUNTER — HOSPITAL ENCOUNTER (OUTPATIENT)
Dept: PHYSICAL THERAPY | Age: 62
Setting detail: THERAPIES SERIES
Discharge: HOME | End: 2022-04-26
Payer: MEDICAID

## 2022-04-26 PROCEDURE — 97140 MANUAL THERAPY 1/> REGIONS: CPT | Performed by: PHYSICAL THERAPY ASSISTANT

## 2022-04-26 PROCEDURE — 97530 THERAPEUTIC ACTIVITIES: CPT | Performed by: PHYSICAL THERAPY ASSISTANT

## 2022-04-26 PROCEDURE — 97110 THERAPEUTIC EXERCISES: CPT | Performed by: PHYSICAL THERAPY ASSISTANT

## 2022-04-26 NOTE — FLOWSHEET NOTE
[facility], [address] [place] [address] [place] Garcia, [address]  Phone: [phone], [phone]    Physical Therapy Treatment Note/ Progress Report:     Date:  2022    Patient Name:  Jimmy Riojas    :  1960  MRN: [id number]  Restrictions/Precautions:    Medical/Treatment Diagnosis Information:      M54.50, G89.29 (ICD-10-CM) - Chronic bilateral low back pain without sciatica   M25.552, G89.29 (ICD-10-CM) - Chronic left hip pain   R29.898 (ICD-10-CM) - Bilateral leg weakness     ·      Insurance/Certification information:    [place] MEDICAID  Physician Information:    AJYLEN Marin CNP  Has the plan of care been signed (Y/N):        []  Yes  [x]  No     Date of Patient follow up with Physician: NS    Is this a Progress Report:     []  Yes  [x]  No      If Yes:  Date Range for reporting period:  Initial Eval: 2022  Beginnin2022 --- Endin22    Progress report will be due (10 Rx or 30 days whichever is less):      Recertification will be due (POC Duration  / 90 days whichever is less): 22      Visit # Insurance Allowable Auth Required   In Person 5  []  Yes     []  No    Tele Health 0  []  Yes     []  No    Total 5       FOTO Score:  (Predicted: )   Date assessed: 2022     Oswestry 34% 22     Latex Allergy:  [x]NO      []YES  Preferred Language for Healthcare:   [x]English       []other:    Pain level:  8/10     SUBJECTIVE:  Slowly making some improvement.       OBJECTIVE: See eval   Observation:    Test measurements:      RESTRICTIONS/PRECAUTIONS: DM/ HTN    Exercises/Interventions:   Therapeutic Ex (02287)  Therapeutic Activity (86206)  NMR re-education (19743) Sets/Reps Notes/CUES   Bike 5' L3         HR / Slant Board  x30 / 3x30\"     Marches  x20    Standing abd x20 R, L    FSU 4\" x20 R, L    Mini Squat  x20    Ab set  5\" x15              LAQ / SAQ  X20 R, L / x20 5\" hold          1K 5x    LTR 10\" x 10     iformis stretch knees bent 10x10\"                   JUAN 1 min     Prone glut set               Supine add ball squeeze 5\" x20    Supine clamshells 20x ea 3 ways green    Supine Marches X20     Supine SLR  2x10 R, L     Bridges  x20 5\" hold green band    Bent knee drop out  5\" x20  R, L                                   Manual Intervention (77462)     MFR L buttock/ LB 8 min    LLE traction  5'    BLE Manual traction  5'                        Medbridge access code: [email or web address]          ESU/ CP prone 10 min    Patient Education         Therapeutic Exercise and NMR EXR  [x] (82515) Provided verbal/tactile cueing for activities related to strengthening, flexibility, endurance, ROM for improvements in LE, proximal hip, and core control with self care, mobility, lifting, ambulation. [x] (76669) Provided verbal/tactile cueing for activities related to improving balance, coordination, kinesthetic sense, posture, motor skill, proprioception to assist with LE, proximal hip, and core control in self-care, mobility, lifting, ambulation and eccentric single leg control. NMR and Therapeutic Activities:    [x] (39688 or 30198) Provided verbal/tactile cueing for activities related to improving balance, coordination, kinesthetic sense, posture, motor skill, proprioception and motor activation to allow for proper function of core, proximal hip and LE with self-care and ADLs and functional mobility.    [x] (89792) Gait Re-education- Provided training and instruction to the patient for proper LE, core and proximal hip recruitment and positioning and eccentric body weight control with ambulation re-education including up and down stairs     Home Exercise Program:    [x] (15531) Reviewed/Progressed HEP activities related to strengthening, flexibility, endurance, ROM of core, proximal hip and LE for functional self-care, mobility, lifting and ambulation/stair navigation   [x] (38164) Reviewed/Progressed HEP activities related to improving balance, coordination, kinesthetic sense, posture, motor skill, proprioception of core, proximal hip and LE for self-care, mobility, lifting, and ambulation/stair navigation      Manual Treatments:  PROM / STM / Oscillations-Mobs:  G-I, II, III, IV (PA's, Inf., Post.)  [x] (38551) Provided manual therapy to mobilize LE, proximal hip and/or LS spine soft tissue/joints for the purpose of modulating pain, promoting relaxation, increasing ROM, reducing/eliminating soft tissue swelling/inflammation/restriction, improving soft tissue extensibility and allowing for proper ROM for normal function with self-care, mobility, lifting and ambulation. Modalities:      Charges:  Timed Code Treatment Minutes: 45   Total Treatment Minutes:  45   BWC:  TE TIME:  NMR TIME:  MANUAL TIME:  UNTIMED MINUTES:  Medicare Total:        [] EVAL (LOW) 07783 (typically 20 minutes face-to-face)  [] EVAL (MOD) 28127 (typically 30 minutes face-to-face)  [] EVAL (HIGH) 29340 (typically 45 minutes face-to-face)  [] RE-EVAL     [x] KK(40616) x  1   [] IONTO  [] NMR (84679) x     [] VASO  [x] Manual (39250) x  1   [] Other:  [x] TA x 1     [] Mech Traction (59110)  [] ES(attended) (41409)      [] ES (un) (73967):    ASSESSMENT:  See eval    GOALS:   Short Term Goals: To be achieved in: 2 weeks  1. Independent in HEP and progression per patient tolerance, in order to prevent re-injury. [x] Progressing: [] Met: [] Not Met: [] Adjusted   2. Patient will have a decrease in pain to facilitate improvement in movement, function, and ADLs as indicated by Functional Deficits. [x] Progressing: [] Met: [] Not Met: [] Adjusted     Long Term Goals: To be achieved in: 6-8 weeks  1. Oswestry score to be equal or greater to the predicted score to assist with reaching prior level of function. [x] Progressing: [] Met: [] Not Met: [] Adjusted   2.  Patient will demonstrate increased AROM to equal the opposite side bilaterally to [facility] as indicated by patients Functional Deficits. [x] Progressing: [] Met: [] Not Met: [] Adjusted   3. Patient will demonstrate an increase in strength to 5/5 to allow for walking as indicated by patients Functional Deficits. [x] Progressing: [] Met: [] Not Met: [] Adjusted   4. Patient will return to all transfers, work activities, and functional activities without increased symptoms or restriction. [x] Progressing: [] Met: [] Not Met: [] Adjusted   5. Patient will have 0/10 pain with ADL's. [x] Progressing: [] Met: [] Not Met: [] Adjusted   6. Patient stated goal: Stand/walk with min to no limitations   [x] Progressing: [] Met: [] Not Met: [] Adjusted       Overall Progression Towards Functional goals/ Treatment Progress Update:  [] Patient is progressing as expected towards functional goals listed. [x] Progression is slowed due to complexities/Impairments listed. [] Progression has been slowed due to co-morbidities.   [] Plan just implemented, too soon to assess goals progression <30days   [] Goals require adjustment due to lack of progress  [] Patient is not progressing as expected and requires additional follow up with physician  [] Other    Prognosis for POC: [x] Good [] Fair  [] Poor    Patient requires continued skilled intervention: [x] Yes  [] No    Treatment/Activity Tolerance:  [x] Patient able to complete treatment  [] Patient limited by fatigue  [] Patient limited by pain    [] Patient limited by other medical complications  [] Other:     Return to Play: (if applicable)   []  Stage 1: Intro to Strength   []  Stage 2: Return to Run and Strength   []  Stage 3: Return to Jump and Strength   []  Stage 4: Dynamic Strength and Agility   []  Stage 5: Sport Specific Training     []  Ready to Return to Play, Meets All Above Stages   [x]  Not Ready for Return to Sports   Comments:                         PLAN: See eval  [x] Continue per plan of care [] Alter current plan (see comments above)  [] Plan of care initiated [] Hold pending MD visit [] Discharge    Electronically signed by:  Lucien Jara PTA, GRACY    Note: If patient does not return for scheduled/ recommended follow up visits, this note will serve as a discharge from care along with most recent update on progress.

## 2022-04-28 ENCOUNTER — HOSPITAL ENCOUNTER (OUTPATIENT)
Dept: PHYSICAL THERAPY | Age: 62
Setting detail: THERAPIES SERIES
Discharge: HOME | End: 2022-04-28
Payer: MEDICAID

## 2022-04-28 NOTE — FLOWSHEET NOTE
[address], [facility] ([facility])    Physical Therapy  Cancellation/No-show Note  Patient Name:  Vikas Ortega  :  1960   Date:  2022    Cancelled visits to date: 1  No-shows to date: 0    For today's appointment patient:  [x]  Cancelled  []  Rescheduled appointment  []  No-show     Reason given by patient:  []  Patient ill  []  Conflicting appointment  [x]  No transportation    []  Conflict with work  []  No reason given  []  Other:     Comments:      Phone call information:   []  Phone call made today to patient. []  Patient answered, conversation as follows:    []  Patient did not answer. [x]  Phone call not needed - pt contacted us to cancel and provided reason for cancellation.      Electronically signed by:  Shama Gagnon PTA

## 2022-06-26 DIAGNOSIS — I10 ESSENTIAL HYPERTENSION: ICD-10-CM

## 2022-06-26 DIAGNOSIS — E11.9 TYPE 2 DIABETES MELLITUS WITHOUT COMPLICATION, WITHOUT LONG-TERM CURRENT USE OF INSULIN (HCC): ICD-10-CM

## 2022-06-27 RX ORDER — LOSARTAN POTASSIUM 25 MG/1
TABLET ORAL
Qty: 30 TABLET | Refills: 1 | Status: SHIPPED | OUTPATIENT
Start: 2022-06-27 | End: 2022-08-29

## 2022-08-29 DIAGNOSIS — E11.9 TYPE 2 DIABETES MELLITUS WITHOUT COMPLICATION, WITHOUT LONG-TERM CURRENT USE OF INSULIN (HCC): ICD-10-CM

## 2022-08-29 DIAGNOSIS — I10 ESSENTIAL HYPERTENSION: ICD-10-CM

## 2022-08-29 RX ORDER — LOSARTAN POTASSIUM 25 MG/1
TABLET ORAL
Qty: 30 TABLET | Refills: 1 | Status: SHIPPED | OUTPATIENT
Start: 2022-08-29

## 2022-09-14 ASSESSMENT — ENCOUNTER SYMPTOMS
RHINORRHEA: 0
DIARRHEA: 0
TROUBLE SWALLOWING: 0
ABDOMINAL PAIN: 0
VOMITING: 0
SINUS PRESSURE: 0
RESPIRATORY NEGATIVE: 1
SORE THROAT: 0
GASTROINTESTINAL NEGATIVE: 1
EYE REDNESS: 0
COUGH: 0
NAUSEA: 0
EYE ITCHING: 0
CHOKING: 0
WHEEZING: 0
STRIDOR: 0
PHOTOPHOBIA: 0
EYE DISCHARGE: 0
ALLERGIC/IMMUNOLOGIC NEGATIVE: 1
CONSTIPATION: 0
EYE PAIN: 0
SHORTNESS OF BREATH: 0
APNEA: 0
BLOOD IN STOOL: 0
COLOR CHANGE: 0
CHEST TIGHTNESS: 0

## 2022-09-15 ENCOUNTER — OFFICE VISIT (OUTPATIENT)
Dept: FAMILY MEDICINE CLINIC | Age: 62
End: 2022-09-15
Payer: MEDICAID

## 2022-09-15 VITALS
DIASTOLIC BLOOD PRESSURE: 78 MMHG | HEIGHT: 63 IN | OXYGEN SATURATION: 98 % | WEIGHT: 274 LBS | SYSTOLIC BLOOD PRESSURE: 130 MMHG | BODY MASS INDEX: 48.55 KG/M2 | HEART RATE: 76 BPM

## 2022-09-15 DIAGNOSIS — R31.1 BENIGN ESSENTIAL MICROSCOPIC HEMATURIA: ICD-10-CM

## 2022-09-15 DIAGNOSIS — G89.29 CHRONIC BILATERAL LOW BACK PAIN WITHOUT SCIATICA: ICD-10-CM

## 2022-09-15 DIAGNOSIS — R29.898 BILATERAL LEG WEAKNESS: ICD-10-CM

## 2022-09-15 DIAGNOSIS — E66.01 CLASS 3 SEVERE OBESITY DUE TO EXCESS CALORIES WITH SERIOUS COMORBIDITY AND BODY MASS INDEX (BMI) OF 45.0 TO 49.9 IN ADULT (HCC): ICD-10-CM

## 2022-09-15 DIAGNOSIS — M25.552 CHRONIC LEFT HIP PAIN: ICD-10-CM

## 2022-09-15 DIAGNOSIS — E78.2 MIXED HYPERLIPIDEMIA: ICD-10-CM

## 2022-09-15 DIAGNOSIS — G89.29 CHRONIC LEFT HIP PAIN: ICD-10-CM

## 2022-09-15 DIAGNOSIS — M54.50 CHRONIC BILATERAL LOW BACK PAIN WITHOUT SCIATICA: ICD-10-CM

## 2022-09-15 DIAGNOSIS — E11.9 TYPE 2 DIABETES MELLITUS WITHOUT COMPLICATION, WITHOUT LONG-TERM CURRENT USE OF INSULIN (HCC): Primary | ICD-10-CM

## 2022-09-15 DIAGNOSIS — I10 ESSENTIAL HYPERTENSION: ICD-10-CM

## 2022-09-15 DIAGNOSIS — E55.9 VITAMIN D DEFICIENCY: ICD-10-CM

## 2022-09-15 LAB
BILIRUBIN, POC: ABNORMAL
BLOOD URINE, POC: ABNORMAL
CLARITY, POC: ABNORMAL
COLOR, POC: YELLOW
CREATININE URINE: 159.6 MG/DL (ref 28–259)
GLUCOSE URINE, POC: ABNORMAL
KETONES, POC: ABNORMAL
LEUKOCYTE EST, POC: ABNORMAL
MICROALBUMIN UR-MCNC: 2.4 MG/DL
MICROALBUMIN/CREAT UR-RTO: 15 MG/G (ref 0–30)
NITRITE, POC: ABNORMAL
PH, POC: 5.5
PROTEIN, POC: ABNORMAL
SPECIFIC GRAVITY, POC: 1.03
UROBILINOGEN, POC: 1

## 2022-09-15 PROCEDURE — 99214 OFFICE O/P EST MOD 30 MIN: CPT | Performed by: NURSE PRACTITIONER

## 2022-09-15 PROCEDURE — 3017F COLORECTAL CA SCREEN DOC REV: CPT | Performed by: NURSE PRACTITIONER

## 2022-09-15 PROCEDURE — G8427 DOCREV CUR MEDS BY ELIG CLIN: HCPCS | Performed by: NURSE PRACTITIONER

## 2022-09-15 PROCEDURE — 2022F DILAT RTA XM EVC RTNOPTHY: CPT | Performed by: NURSE PRACTITIONER

## 2022-09-15 PROCEDURE — 3052F HG A1C>EQUAL 8.0%<EQUAL 9.0%: CPT | Performed by: NURSE PRACTITIONER

## 2022-09-15 PROCEDURE — G8417 CALC BMI ABV UP PARAM F/U: HCPCS | Performed by: NURSE PRACTITIONER

## 2022-09-15 PROCEDURE — 1036F TOBACCO NON-USER: CPT | Performed by: NURSE PRACTITIONER

## 2022-09-15 PROCEDURE — 81002 URINALYSIS NONAUTO W/O SCOPE: CPT | Performed by: NURSE PRACTITIONER

## 2022-09-15 RX ORDER — GLIPIZIDE 10 MG/1
10 TABLET, FILM COATED, EXTENDED RELEASE ORAL DAILY
Qty: 90 TABLET | Refills: 3 | Status: SHIPPED | OUTPATIENT
Start: 2022-09-15

## 2022-09-15 RX ORDER — BLOOD-GLUCOSE METER
1 KIT MISCELLANEOUS DAILY
Qty: 100 EACH | Refills: 3 | Status: CANCELLED | OUTPATIENT
Start: 2022-09-15

## 2022-09-15 RX ORDER — SIMVASTATIN 40 MG
40 TABLET ORAL NIGHTLY
Qty: 90 TABLET | Refills: 3 | Status: SHIPPED | OUTPATIENT
Start: 2022-09-15

## 2022-09-15 ASSESSMENT — ENCOUNTER SYMPTOMS: BACK PAIN: 1

## 2022-09-15 NOTE — PROGRESS NOTES
[place]  PHYSIC[facility]ACT[facility]  [facility]  [address] [place] [address]  [phone]  Dept: [phone]  Dept Fax: [phone]  Loc: [phone]    Anton Fothergill is a 60 y.o. female who presents today for her medical conditions/complaints as noted below. Anton Fothergill is c/o of Diabetes (Pt states she does check her sugar and it has been higher. Pt has been out of glipizide for few months and know that is why her sugar has been high. ) and Hyperlipidemia (Pt is fasting and takes medication every day)        HPI:     Chief Complaint   Patient presents with    Diabetes     Pt states she does check her sugar and it has been higher. Pt has been out of glipizide for few months and know that is why her sugar has been high. Hyperlipidemia     Pt is fasting and takes medication every day       HPI    Patient is here today to follow up on hypertension. Taking medications as prescribed - Losartan 25 mg daily. Is trying to adhere to a no salt diet. Denies any chest pain, leg swelling, orthopnea, dizziness. Patient is here for follow up on diabetes. She has not been taking Glipizide 10 mg XL daily. She has been taking her Metformin 1,000 BID. She admits that she ran out of her glipizide 10 mg XL daily and she was staying with her son on admission Giovanna Dee is not able to get a refill. I think that her glucose has been doing well where the majority of her glucose readings are less than 150. She denies any recent hypoglycemia episodes. She admits that when she was taking glipizide 10 mg XL she did have 1 low glucose reading of 69. Denies any increased urination, hunger, or thirst.  Trying to eat a healthy, diabetic diet. Her last A1C was 8.9 on 03/23/2022. Patient is here for hyperlipidemia follow up. Taking medication as prescribed - Simvastatin 40 mg daily. Denies any side effects to the medication. Trying to adhere to a low cholesterol diet. Denies any generalized myalgias.      Ely Rosas 12 hours on, 12 hours off. 30 patch 2    FreeStyle Lancets MISC 1 each by Does not apply route daily 100 each 3    blood glucose test strips (ASCENSIA AUTODISC VI;ONE TOUCH ULTRA TEST VI) strip 1 each by In Vitro route daily 100 each 3    glucose monitoring kit (FREESTYLE) monitoring kit 1 kit by Does not apply route daily 1 kit 0    fluticasone (FLONASE) 50 MCG/ACT nasal spray 1 spray by Nasal route daily 1 Bottle 3    cetirizine (ZYRTEC ALLERGY) 10 MG tablet Take 1 tablet by mouth daily 90 tablet 3    loratadine (CLARITIN) 10 MG tablet Take 1 tablet by mouth daily 90 tablet 3    vitamin D (CHOLECALCIFEROL) 1000 UNIT TABS tablet Take 1 tablet by mouth daily 30 tablet 3    aspirin 81 MG tablet Take 81 mg by mouth daily       No current facility-administered medications for this visit. No Known Allergies    :      Review of Systems   Constitutional:  Positive for fatigue. Negative for activity change, appetite change, chills, diaphoresis, fever and unexpected weight change. HENT: Negative. Negative for ear pain, rhinorrhea, sinus pressure, sneezing, sore throat and trouble swallowing. Eyes:  Negative for photophobia, pain, discharge, redness, itching and visual disturbance. Respiratory: Negative. Negative for apnea, cough, choking, chest tightness, shortness of breath, wheezing and stridor. Cardiovascular: Negative. Negative for chest pain, palpitations and leg swelling. Gastrointestinal: Negative. Negative for abdominal pain, blood in stool, constipation, diarrhea, nausea and vomiting. Genitourinary: Negative. Negative for decreased urine volume, difficulty urinating, dysuria, enuresis, flank pain, frequency, genital sores, hematuria and urgency. Musculoskeletal:  Positive for arthralgias, back pain and gait problem. Negative for joint swelling, myalgias, neck pain and neck stiffness. Skin: Negative. Negative for color change, pallor, rash and wound. Allergic/Immunologic: Negative. bruit.      Trachea: No tracheal deviation. Cardiovascular:      Rate and Rhythm: Normal rate and regular rhythm. Pulses: Normal pulses. Heart sounds: Normal heart sounds. No murmur heard. No friction rub. No gallop. Pulmonary:      Effort: Pulmonary effort is normal. No respiratory distress. Breath sounds: Normal breath sounds. No stridor. No wheezing, rhonchi or rales. Chest:      Chest wall: No tenderness. Abdominal:      General: Bowel sounds are normal. There is no distension. Palpations: Abdomen is soft. There is no mass. Tenderness: There is no abdominal tenderness. There is no guarding or rebound. Hernia: No hernia is present. Musculoskeletal:         General: No swelling, deformity or signs of injury. Cervical back: Normal range of motion and neck supple. No rigidity. No muscular tenderness. Lumbar back: Tenderness present. Decreased range of motion. Left hip: Tenderness present. Decreased range of motion. Decreased strength. Right lower leg: No edema. Left lower leg: No edema. Lymphadenopathy:      Cervical: No cervical adenopathy. Skin:     General: Skin is warm and dry. Capillary Refill: Capillary refill takes less than 2 seconds. Coloration: Skin is not jaundiced or pale. Findings: No bruising, erythema, lesion or rash. Neurological:      General: No focal deficit present. Mental Status: She is alert and oriented to person, place, and time. Mental status is at baseline. Cranial Nerves: No cranial nerve deficit. Sensory: No sensory deficit. Motor: No weakness or abnormal muscle tone. Coordination: Coordination normal.      Gait: Gait normal.      Deep Tendon Reflexes: Reflexes are normal and symmetric. Reflexes normal.   Psychiatric:         Mood and Affect: Mood normal.         Behavior: Behavior normal.         Thought Content:  Thought content normal.         Judgment: Judgment normal. Office Visit on 03/23/2022   Component Date Value Ref Range Status    WBC 03/23/2022 8.7  4.0 - 11.0 K/uL Final    RBC 03/23/2022 5.00  4.00 - 5.20 M/uL Final    Hemoglobin 03/23/2022 14.1  12.0 - 16.0 g/dL Final    Hematocrit 03/23/2022 43.4  36.0 - 48.0 % Final    MCV 03/23/2022 86.9  80.0 - 100.0 fL Final    MCH 03/23/2022 28.2  26.0 - 34.0 pg Final    MCHC 03/23/2022 32.5  31.0 - 36.0 g/dL Final    RDW 03/23/2022 15.3  12.4 - 15.4 % Final    Platelets 83/50/1761 443  135 - 450 K/uL Final    MPV 03/23/2022 8.1  5.0 - 10.5 fL Final    Neutrophils % 03/23/2022 50.0  % Final    Lymphocytes % 03/23/2022 34.2  % Final    Monocytes % 03/23/2022 9.5  % Final    Eosinophils % 03/23/2022 5.5  % Final    Basophils % 03/23/2022 0.8  % Final    Neutrophils Absolute 03/23/2022 4.4  1.7 - 7.7 K/uL Final    Lymphocytes Absolute 03/23/2022 3.0  1.0 - 5.1 K/uL Final    Monocytes Absolute 03/23/2022 0.8  0.0 - 1.3 K/uL Final    Eosinophils Absolute 03/23/2022 0.5  0.0 - 0.6 K/uL Final    Basophils Absolute 03/23/2022 0.1  0.0 - 0.2 K/uL Final    Sodium 03/23/2022 138  136 - 145 mmol/L Final    Potassium 03/23/2022 5.0  3.5 - 5.1 mmol/L Final    Chloride 03/23/2022 101  99 - 110 mmol/L Final    CO2 03/23/2022 22  21 - 32 mmol/L Final    Anion Gap 03/23/2022 15  3 - 16 Final    Glucose 03/23/2022 144 (A) 70 - 99 mg/dL Final    BUN 03/23/2022 15  7 - 20 mg/dL Final    Creatinine 03/23/2022 0.8  0.6 - 1.2 mg/dL Final    GFR Non- 03/23/2022 >60  >60 Final    Comment: >60 mL/min/1.73m2 EGFR, calc. for ages 25 and older using the  MDRD formula (not corrected for weight), is valid for stable  renal function. GFR  03/23/2022 >60  >60 Final    Comment: Chronic Kidney Disease: less than 60 ml/min/1.73 sq.m. Kidney Failure: less than 15 ml/min/1.73 sq.m. Results valid for patients 18 years and older.       Calcium 03/23/2022 9.8  8.3 - 10.6 mg/dL Final    Total Protein 03/23/2022 6.8  6.4 - 8.2 g/dL Final    Albumin 03/23/2022 4.3  3.4 - 5.0 g/dL Final    Albumin/Globulin Ratio 03/23/2022 1.7  1.1 - 2.2 Final    Total Bilirubin 03/23/2022 0.5  0.0 - 1.0 mg/dL Final    Alkaline Phosphatase 03/23/2022 92  40 - 129 U/L Final    ALT 03/23/2022 22  10 - 40 U/L Final    AST 03/23/2022 41 (A) 15 - 37 U/L Final    Cholesterol, Total 03/23/2022 177  0 - 199 mg/dL Final    Triglycerides 03/23/2022 120  0 - 150 mg/dL Final    HDL 03/23/2022 59  40 - 60 mg/dL Final    LDL Calculated 03/23/2022 94  <100 mg/dL Final    VLDL Cholesterol Calculated 03/23/2022 24  Not Established mg/dL Final    Vit D, 25-Hydroxy 03/23/2022 40.1  >=30 ng/mL Final    Comment: <=20 ng/mL. ........... Pinky Angles Deficient  21-29 ng/mL. ......... Pinky Angles Insufficient  >=30 ng/mL. ........ Pinky Angles Sufficient      Vitamin B-12 03/23/2022 361  211 - 911 pg/mL Final    Folate 03/23/2022 5.38  4.78 - 24.20 ng/mL Final    Comment: Effective 11-15-16 10:00am EST  Please note reference ranges have  changed for Folate. TSH 03/23/2022 2.98  0.27 - 4.20 uIU/mL Final    Hemoglobin A1C 03/23/2022 8.9  See comment % Final    Comment: Comment:  Diagnosis of Diabetes: > or = 6.5%  Increased risk of diabetes (Prediabetes): 5.7-6.4%  Glycemic Control: Nonpregnant Adults: <7.0%                    Pregnant: <6.0%        eAG 03/23/2022 208.7  mg/dL Final           Assessment & Plan: The following diagnoses and conditions are stable with no further orders unless indicated:  1. Type 2 diabetes mellitus without complication, without long-term current use of insulin ([address]    2. Essential hypertension    3. Mixed hyperlipidemia    4. Vitamin D deficiency    5. Chronic bilateral low back pain without sciatica    6. Chronic left hip pain    7. Bilateral leg weakness    8. Class 3 severe obesity due to excess calories with serious comorbidity and body mass index (BMI) of 45.0 to 49.9 in adult ([facility] [address]    9.  Benign essential microscopic hematuria        Tiffany Krause was seen today for diabetes and hyperlipidemia. Diabetes symptoms stable; however, concerns for hyperglycemia with her being out of her Glipizide for the last several months. She has lost 6 pounds since her last office visit and states she has been trying to improve her diet. Recommend her going back on glipizide 10 mg XL and making sure that she does not skip meals when taking. She is to check her glucose daily and as needed. She is going to come back for labs in roughly 8 weeks. Blood pressure well controlled on losartan 25 mg daily. She is doing well with her statin. Her back pain and left hip pain has not improved with physical therapy. Discussed getting an MRI versus following up with orthopedic. She is going to consider getting an MRI and will call the office for order to be placed when she gets back from going to [place] again to care for her son in 11 weeks. Diagnoses and all orders for this visit:    Type 2 diabetes mellitus without complication, without long-term current use of insulin (HCC)  -     CBC with Auto Differential  -     Comprehensive Metabolic Panel  -     Hemoglobin A1C  -     POCT Urinalysis no Micro  -     Microalbumin / Creatinine Urine Ratio  -     glipiZIDE (GLUCOTROL XL) 10 MG extended release tablet; Take 1 tablet by mouth daily    Essential hypertension  -     CBC with Auto Differential  -     Comprehensive Metabolic Panel    Mixed hyperlipidemia  -     Comprehensive Metabolic Panel  -     Lipid Panel  -     simvastatin (ZOCOR) 40 MG tablet; Take 1 tablet by mouth nightly    Vitamin D deficiency    Chronic bilateral low back pain without sciatica    Chronic left hip pain    Bilateral leg weakness    Class 3 severe obesity due to excess calories with serious comorbidity and body mass index (BMI) of 45.0 to 49.9 in adult [facility])    Benign essential microscopic hematuria  -     Culture, Urine    Prior to Visit Medications    Medication Sig Taking?  Authorizing Provider   glipiZIDE (GLUCOTROL XL) 10 MG extended release tablet Take 1 tablet by mouth daily Yes JAYLEN Olvera CNP   simvastatin (ZOCOR) 40 MG tablet Take 1 tablet by mouth nightly Yes JAYLEN Olvera CNP   losartan (COZAAR) 25 MG tablet TAKE ONE TABLET BY MOUTH DAILY Yes Patricia Alba MD   metFORMIN (GLUCOPHAGE) 500 MG tablet [place] Fruit Yes Patricia Alba MD   blood glucose test strips (FREESTYLE LITE) strip 1 each by In Vitro route daily As needed. Yes JAYLEN Olvera CNP   Blood Pressure KIT Use to check blood pressure daily Yes JAYLEN Olvera CNP   lidocaine (LIDODERM) 5 % Place 1 patch onto the skin daily 12 hours on, 12 hours off.  Yes JAYLEN Olvera CNP   FreeStyle Lancets MISC 1 each by Does not apply route daily Yes JAYLEN Olvera CNP   blood glucose test strips (ASCENSIA AUTODISC VI;ONE TOUCH ULTRA TEST VI) strip 1 each by In Vitro route daily Yes JAYLEN Olvera CNP   glucose monitoring kit (FREESTYLE) monitoring kit 1 kit by Does not apply route daily Yes JAYLEN Olvera CNP   fluticasone (FLONASE) 50 MCG/ACT nasal spray 1 spray by Nasal route daily Yes JAYLEN Olvera CNP   cetirizine (ZYRTEC ALLERGY) 10 MG tablet Take 1 tablet by mouth daily Yes JAYLEN Olvera CNP   loratadine (CLARITIN) 10 MG tablet Take 1 tablet by mouth daily Yes JAYLEN Olvera CNP   vitamin D (CHOLECALCIFEROL) 1000 UNIT TABS tablet Take 1 tablet by mouth daily Yes JAYLEN Olvera CNP   aspirin 81 MG tablet Take 81 mg by mouth daily Yes Historical Provider, MD     Orders Placed This Encounter   Medications    glipiZIDE (GLUCOTROL XL) 10 MG extended release tablet     Sig: Take 1 tablet by mouth daily     Dispense:  90 tablet     Refill:  3    simvastatin (ZOCOR) 40 MG tablet     Sig: Take 1 tablet by mouth nightly     Dispense:  90 tablet     Refill:  3 Return in about 8 weeks (around 11/10/2022) for Lab only - . Patient should call the office immediately with new or ongoing signs or symptoms or worsening, or proceedto the emergency room. No changes in past medical history, past surgical history, social history, or family history were noted during the patient encounter unless specifically listed above. All updates of past medicalhistory, past surgical history, social history, or family history were reviewed personally by me during the office visit. All problems listed in the assessment are stable unless noted otherwise. Medication profilereviewed personally by me during the office visit. Medication side effects and possible impairments from medications were discussed as applicable. Call if pattern of symptoms change or persists for an extended time. This document was prepared by a combination of typing and transcription through a voice recognition software. All medications have the potential for adverse effects. All medications effect each person differently. Please read and review provided information related to medication. If the medication that you have been prescribed has the potential to cause sedation, do not drive or operate car, truck, or heavy machinery until you know how the medication will effect you. If you experience any adverse effects from the medication, please call the office or report to the emergency department.

## 2022-09-17 LAB — URINE CULTURE, ROUTINE: NORMAL

## 2022-09-19 DIAGNOSIS — R82.90 CONTAMINATION OF URINE CULTURE: Primary | ICD-10-CM

## 2022-11-07 DIAGNOSIS — I10 ESSENTIAL HYPERTENSION: ICD-10-CM

## 2022-11-07 DIAGNOSIS — E11.9 TYPE 2 DIABETES MELLITUS WITHOUT COMPLICATION, WITHOUT LONG-TERM CURRENT USE OF INSULIN (HCC): ICD-10-CM

## 2022-11-07 RX ORDER — LOSARTAN POTASSIUM 25 MG/1
TABLET ORAL
Qty: 90 TABLET | Refills: 1 | Status: SHIPPED | OUTPATIENT
Start: 2022-11-07

## 2022-11-21 ENCOUNTER — NURSE ONLY (OUTPATIENT)
Dept: FAMILY MEDICINE CLINIC | Age: 62
End: 2022-11-21
Payer: MEDICAID

## 2022-11-21 DIAGNOSIS — Z13.21 ENCOUNTER FOR VITAMIN DEFICIENCY SCREENING: ICD-10-CM

## 2022-11-21 DIAGNOSIS — Z13.29 SCREENING FOR THYROID DISORDER: ICD-10-CM

## 2022-11-21 DIAGNOSIS — E11.9 TYPE 2 DIABETES MELLITUS WITHOUT COMPLICATION, WITHOUT LONG-TERM CURRENT USE OF INSULIN (HCC): Primary | ICD-10-CM

## 2022-11-21 DIAGNOSIS — E78.2 MIXED HYPERLIPIDEMIA: ICD-10-CM

## 2022-11-21 DIAGNOSIS — I10 ESSENTIAL HYPERTENSION: ICD-10-CM

## 2022-11-21 DIAGNOSIS — E55.9 VITAMIN D DEFICIENCY: ICD-10-CM

## 2022-11-21 LAB
A/G RATIO: 1.7 (ref 1.1–2.2)
ALBUMIN SERPL-MCNC: 4.3 G/DL (ref 3.4–5)
ALP BLD-CCNC: 109 U/L (ref 40–129)
ALT SERPL-CCNC: 18 U/L (ref 10–40)
ANION GAP SERPL CALCULATED.3IONS-SCNC: 16 MMOL/L (ref 3–16)
AST SERPL-CCNC: 30 U/L (ref 15–37)
BASOPHILS ABSOLUTE: 0 K/UL (ref 0–0.2)
BASOPHILS RELATIVE PERCENT: 0.3 %
BILIRUB SERPL-MCNC: 0.6 MG/DL (ref 0–1)
BUN BLDV-MCNC: 19 MG/DL (ref 7–20)
CALCIUM SERPL-MCNC: 9.9 MG/DL (ref 8.3–10.6)
CHLORIDE BLD-SCNC: 103 MMOL/L (ref 99–110)
CHOLESTEROL, TOTAL: 167 MG/DL (ref 0–199)
CO2: 25 MMOL/L (ref 21–32)
CREAT SERPL-MCNC: 0.8 MG/DL (ref 0.6–1.2)
EOSINOPHILS ABSOLUTE: 0.3 K/UL (ref 0–0.6)
EOSINOPHILS RELATIVE PERCENT: 2.9 %
FOLATE: 7.02 NG/ML (ref 4.78–24.2)
GFR SERPL CREATININE-BSD FRML MDRD: >60 ML/MIN/{1.73_M2}
GLUCOSE BLD-MCNC: 149 MG/DL (ref 70–99)
HCT VFR BLD CALC: 43.1 % (ref 36–48)
HDLC SERPL-MCNC: 59 MG/DL (ref 40–60)
HEMOGLOBIN: 13.8 G/DL (ref 12–16)
LDL CHOLESTEROL CALCULATED: 87 MG/DL
LYMPHOCYTES ABSOLUTE: 3.8 K/UL (ref 1–5.1)
LYMPHOCYTES RELATIVE PERCENT: 37.1 %
MCH RBC QN AUTO: 28.2 PG (ref 26–34)
MCHC RBC AUTO-ENTMCNC: 32 G/DL (ref 31–36)
MCV RBC AUTO: 88.2 FL (ref 80–100)
MONOCYTES ABSOLUTE: 0.8 K/UL (ref 0–1.3)
MONOCYTES RELATIVE PERCENT: 7.9 %
NEUTROPHILS ABSOLUTE: 5.3 K/UL (ref 1.7–7.7)
NEUTROPHILS RELATIVE PERCENT: 51.8 %
PDW BLD-RTO: 15.7 % (ref 12.4–15.4)
PLATELET # BLD: 433 K/UL (ref 135–450)
PMV BLD AUTO: 8.6 FL (ref 5–10.5)
POTASSIUM SERPL-SCNC: 4.8 MMOL/L (ref 3.5–5.1)
RBC # BLD: 4.89 M/UL (ref 4–5.2)
SODIUM BLD-SCNC: 144 MMOL/L (ref 136–145)
TOTAL PROTEIN: 6.9 G/DL (ref 6.4–8.2)
TRIGL SERPL-MCNC: 104 MG/DL (ref 0–150)
TSH REFLEX: 1.91 UIU/ML (ref 0.27–4.2)
VITAMIN B-12: 362 PG/ML (ref 211–911)
VITAMIN D 25-HYDROXY: 99.7 NG/ML
VLDLC SERPL CALC-MCNC: 21 MG/DL
WBC # BLD: 10.3 K/UL (ref 4–11)

## 2022-11-21 PROCEDURE — 36415 COLL VENOUS BLD VENIPUNCTURE: CPT | Performed by: NURSE PRACTITIONER

## 2022-11-22 LAB
ESTIMATED AVERAGE GLUCOSE: 191.5 MG/DL
HBA1C MFR BLD: 8.3 %

## 2022-11-28 DIAGNOSIS — E11.9 TYPE 2 DIABETES MELLITUS WITHOUT COMPLICATION, WITHOUT LONG-TERM CURRENT USE OF INSULIN (HCC): ICD-10-CM

## 2022-11-28 RX ORDER — GLIPIZIDE 10 MG/1
10 TABLET, FILM COATED, EXTENDED RELEASE ORAL 2 TIMES DAILY
Qty: 180 TABLET | Refills: 3 | Status: SHIPPED | OUTPATIENT
Start: 2022-11-28

## 2022-12-01 ENCOUNTER — TELEPHONE (OUTPATIENT)
Dept: FAMILY MEDICINE CLINIC | Age: 62
End: 2022-12-01

## 2022-12-01 DIAGNOSIS — M54.40 LOW BACK PAIN WITH SCIATICA, SCIATICA LATERALITY UNSPECIFIED, UNSPECIFIED BACK PAIN LATERALITY, UNSPECIFIED CHRONICITY: Primary | ICD-10-CM

## 2022-12-01 NOTE — TELEPHONE ENCOUNTER
May wait for PCP to return to determine if she will need to see the patient in office since she has not been seen since September

## 2022-12-01 NOTE — TELEPHONE ENCOUNTER
Pt called stating that she's back from [place] and would like to get the MRI done that they discussed at her last appt.    Please place order and call pt once it's done [phone]  Routing to [place] due to PCP out of office

## 2022-12-02 NOTE — TELEPHONE ENCOUNTER
Please follow up on request. Recommend MRI of her back. MRI spine of lumbar spine WO contrast.     Routed to [facility] as well so she is aware     Spoke with patient.   Order placed and number given to schedule

## 2022-12-16 ENCOUNTER — HOSPITAL ENCOUNTER (OUTPATIENT)
Dept: MRI IMAGING | Age: 62
Discharge: HOME | End: 2022-12-16
Payer: MEDICAID

## 2022-12-16 ENCOUNTER — TELEPHONE (OUTPATIENT)
Dept: FAMILY MEDICINE CLINIC | Age: 62
End: 2022-12-16

## 2022-12-16 DIAGNOSIS — R93.7 ABNORMAL MRI, LUMBAR SPINE: Primary | ICD-10-CM

## 2022-12-16 DIAGNOSIS — M54.40 LOW BACK PAIN WITH SCIATICA, SCIATICA LATERALITY UNSPECIFIED, UNSPECIFIED BACK PAIN LATERALITY, UNSPECIFIED CHRONICITY: ICD-10-CM

## 2022-12-16 PROCEDURE — 72148 MRI LUMBAR SPINE W/O DYE: CPT

## 2022-12-16 NOTE — TELEPHONE ENCOUNTER
Spoke with patient and advised on results. Referral placed and number given to schedule with Dr. Jazmine Cruz.

## 2022-12-16 NOTE — TELEPHONE ENCOUNTER
----- Message from JAYLEN Dukes CNP sent at 12/16/2022  2:29 PM EST -----  Arthritis noted in L3-L4 with disc bulge. L4-L5 with disc bulge and arthritis and mild grade 1 anterolisthesis. Anterolisthesis is a spine condition in which the upper vertebral body, the drum-shaped area in front of each vertebrae, slips forward onto the vertebra below. The amount of slippage is graded on a scale from 1 to 4.  Grade 1 is mild (less than 25% slippage)    Recommend following up with Dr. Sallie Parikh

## 2023-01-06 DIAGNOSIS — E11.9 TYPE 2 DIABETES MELLITUS WITHOUT COMPLICATION, WITHOUT LONG-TERM CURRENT USE OF INSULIN (HCC): ICD-10-CM

## 2023-01-11 ENCOUNTER — OFFICE VISIT (OUTPATIENT)
Dept: FAMILY MEDICINE CLINIC | Age: 63
End: 2023-01-11
Payer: MEDICAID

## 2023-01-11 VITALS
WEIGHT: 275 LBS | SYSTOLIC BLOOD PRESSURE: 124 MMHG | HEIGHT: 62 IN | OXYGEN SATURATION: 97 % | HEART RATE: 91 BPM | BODY MASS INDEX: 50.61 KG/M2 | DIASTOLIC BLOOD PRESSURE: 76 MMHG

## 2023-01-11 DIAGNOSIS — M54.50 CHRONIC BILATERAL LOW BACK PAIN WITHOUT SCIATICA: ICD-10-CM

## 2023-01-11 DIAGNOSIS — E11.65 TYPE 2 DIABETES MELLITUS WITH HYPERGLYCEMIA, WITHOUT LONG-TERM CURRENT USE OF INSULIN (HCC): Primary | ICD-10-CM

## 2023-01-11 DIAGNOSIS — E78.2 MIXED HYPERLIPIDEMIA: ICD-10-CM

## 2023-01-11 DIAGNOSIS — R29.898 BILATERAL LEG WEAKNESS: ICD-10-CM

## 2023-01-11 DIAGNOSIS — M53.3 SI (SACROILIAC) JOINT DYSFUNCTION: ICD-10-CM

## 2023-01-11 DIAGNOSIS — I10 ESSENTIAL HYPERTENSION: ICD-10-CM

## 2023-01-11 DIAGNOSIS — G89.29 CHRONIC BILATERAL LOW BACK PAIN WITHOUT SCIATICA: ICD-10-CM

## 2023-01-11 DIAGNOSIS — E66.01 CLASS 3 SEVERE OBESITY DUE TO EXCESS CALORIES WITH SERIOUS COMORBIDITY AND BODY MASS INDEX (BMI) OF 45.0 TO 49.9 IN ADULT (HCC): ICD-10-CM

## 2023-01-11 PROBLEM — E11.9 TYPE 2 DIABETES MELLITUS WITHOUT COMPLICATION, WITHOUT LONG-TERM CURRENT USE OF INSULIN (HCC): Status: RESOLVED | Noted: 2017-04-25 | Resolved: 2023-01-11

## 2023-01-11 PROCEDURE — 3078F DIAST BP <80 MM HG: CPT | Performed by: NURSE PRACTITIONER

## 2023-01-11 PROCEDURE — G8427 DOCREV CUR MEDS BY ELIG CLIN: HCPCS | Performed by: NURSE PRACTITIONER

## 2023-01-11 PROCEDURE — G8484 FLU IMMUNIZE NO ADMIN: HCPCS | Performed by: NURSE PRACTITIONER

## 2023-01-11 PROCEDURE — 3074F SYST BP LT 130 MM HG: CPT | Performed by: NURSE PRACTITIONER

## 2023-01-11 PROCEDURE — 1036F TOBACCO NON-USER: CPT | Performed by: NURSE PRACTITIONER

## 2023-01-11 PROCEDURE — 3046F HEMOGLOBIN A1C LEVEL >9.0%: CPT | Performed by: NURSE PRACTITIONER

## 2023-01-11 PROCEDURE — 3017F COLORECTAL CA SCREEN DOC REV: CPT | Performed by: NURSE PRACTITIONER

## 2023-01-11 PROCEDURE — 2022F DILAT RTA XM EVC RTNOPTHY: CPT | Performed by: NURSE PRACTITIONER

## 2023-01-11 PROCEDURE — 99214 OFFICE O/P EST MOD 30 MIN: CPT | Performed by: NURSE PRACTITIONER

## 2023-01-11 PROCEDURE — G8417 CALC BMI ABV UP PARAM F/U: HCPCS | Performed by: NURSE PRACTITIONER

## 2023-01-11 RX ORDER — BLOOD-GLUCOSE METER
1 KIT MISCELLANEOUS DAILY
Qty: 100 EACH | Refills: 3 | Status: SHIPPED | OUTPATIENT
Start: 2023-01-11

## 2023-01-11 ASSESSMENT — ENCOUNTER SYMPTOMS
BLOOD IN STOOL: 0
EYE ITCHING: 0
RESPIRATORY NEGATIVE: 1
EYE DISCHARGE: 0
APNEA: 0
COLOR CHANGE: 0
CHEST TIGHTNESS: 0
DIARRHEA: 0
EYE REDNESS: 0
GASTROINTESTINAL NEGATIVE: 1
WHEEZING: 0
STRIDOR: 0
SHORTNESS OF BREATH: 0
SORE THROAT: 0
ABDOMINAL PAIN: 0
EYE PAIN: 0
BACK PAIN: 1
PHOTOPHOBIA: 0
COUGH: 0
CONSTIPATION: 0
SINUS PRESSURE: 0
VOMITING: 0
CHOKING: 0
RHINORRHEA: 0
ALLERGIC/IMMUNOLOGIC NEGATIVE: 1
TROUBLE SWALLOWING: 0
NAUSEA: 0

## 2023-01-11 ASSESSMENT — PATIENT HEALTH QUESTIONNAIRE - PHQ9
2. FEELING DOWN, DEPRESSED OR HOPELESS: 0
1. LITTLE INTEREST OR PLEASURE IN DOING THINGS: 0
SUM OF ALL RESPONSES TO PHQ QUESTIONS 1-9: 0
SUM OF ALL RESPONSES TO PHQ9 QUESTIONS 1 & 2: 0
SUM OF ALL RESPONSES TO PHQ QUESTIONS 1-9: 0

## 2023-01-11 NOTE — PROGRESS NOTES
[facility]  PHYSICIAN PRACTICES  [facility]  [address] [place] [address]  [phone]  Dept: [phone]  Dept Fax: [phone]  Loc: [phone]    Min Grace is a 60 y.o. female who presents today for her medical conditions/complaints as noted below. Min rGace is c/o of Diabetes (Pt does not check sugar at home. Pt takes medication every day. ), Hyperlipidemia (Pt is not fasting. Pt takes medication every day. ), and Hypertension (Pt does not check bp at home. Pt does take medication every day. )        HPI:     Chief Complaint   Patient presents with    Diabetes     Pt does not check sugar at home. Pt takes medication every day. Hyperlipidemia     Pt is not fasting. Pt takes medication every day. Hypertension     Pt does not check bp at home. Pt does take medication every day. HPI    Patient is here today to follow up on hypertension. Taking medications as prescribed - Losartan 25 mg daily. Is trying to adhere to a no salt diet. Denies any chest pain, leg swelling, orthopnea, dizziness. Patient is here for follow up on diabetes. She has not been taking Glipizide 10 mg XL two times a day. She has been taking her Metformin 1,000 BID. She has not had any hypoglycemic episodes. She has not noticed any increase in urination, hunger, thirst.  Her last eye exam was at [facility]. She went about 1-1.5 months ago roughly. She states she has not been watching her diet here recently as she states that she has a family member that is been in hospice that she has been caring for. She is trying to get back to watching her diet more. Patient is here for hyperlipidemia follow up. Taking medication as prescribed - Simvastatin 40 mg daily. Denies any side effects to the medication. Trying to adhere to a low cholesterol diet. Denies any generalized myalgias. Her last A1C was 8.3 on 11/21/2022.     Reina Crystal admits to having lateral left hip pain and chronic back pain for the last several months. She admits that the pain is worse with weather changes and when she is walking linger periods. She admits she went to physical therapy for her left hip and back pain. She states she has taken Tylenol in the past for the pain and every once a while she will take an Aleve. This seems to help with the pain some. She had imaging with x-rays and also MRI of her spine. She states her pain did not improve much with physical therapy and she still feels like her legs are weak and are going to give out on her at times. She was given a referral to follow up with spine specialist.  She has an appointment to see the spine specialist tomorrow. Past Medical History:   Diagnosis Date    Diabetes mellitus ([facility] [address]     Fractures     Hyperlipidemia     Hypertension     Obesity     Seasonal allergies     Type 2 diabetes mellitus with hyperglycemia, without long-term current use of insulin ([address] 4/25/2017      Past Surgical History:   Procedure Laterality Date    TONSILLECTOMY      TUBAL LIGATION  1983       Family History   Problem Relation Age of Onset    High Blood Pressure Mother     Cancer Mother        Social History     Tobacco Use    Smoking status: Never    Smokeless tobacco: Never   Substance Use Topics    Alcohol use: No      Current Outpatient Medications   Medication Sig Dispense Refill    blood glucose test strips (FREESTYLE LITE) strip 1 each by In Vitro route daily As needed. 100 each 3    metFORMIN (GLUCOPHAGE) 500 MG tablet TAKE TWO TABLETS BY MOUTH TWICE A  tablet 1    glipiZIDE (GLUCOTROL XL) 10 MG extended release tablet Take 1 tablet by mouth in the morning and at bedtime 180 tablet 3    losartan (COZAAR) 25 MG tablet 1 po QD 90 tablet 1    simvastatin (ZOCOR) 40 MG tablet Take 1 tablet by mouth nightly 90 tablet 3    lidocaine (LIDODERM) 5 % Place 1 patch onto the skin daily 12 hours on, 12 hours off.  30 patch 2    FreeStyle Lancets MISC 1 each by Does not apply route daily 100 each 3    glucose monitoring kit (FREESTYLE) monitoring kit 1 kit by Does not apply route daily 1 kit 0    fluticasone (FLONASE) 50 MCG/ACT nasal spray 1 spray by Nasal route daily 1 Bottle 3    cetirizine (ZYRTEC ALLERGY) 10 MG tablet Take 1 tablet by mouth daily 90 tablet 3    loratadine (CLARITIN) 10 MG tablet Take 1 tablet by mouth daily 90 tablet 3    vitamin D (CHOLECALCIFEROL) 1000 UNIT TABS tablet Take 1 tablet by mouth daily 30 tablet 3    aspirin 81 MG tablet Take 81 mg by mouth daily       No current facility-administered medications for this visit. No Known Allergies    :      Review of Systems   Constitutional:  Positive for fatigue. Negative for activity change, appetite change, chills, diaphoresis, fever and unexpected weight change. HENT: Negative. Negative for ear pain, rhinorrhea, sinus pressure, sneezing, sore throat and trouble swallowing. Eyes:  Negative for photophobia, pain, discharge, redness, itching and visual disturbance. Respiratory: Negative. Negative for apnea, cough, choking, chest tightness, shortness of breath, wheezing and stridor. Cardiovascular: Negative. Negative for chest pain, palpitations and leg swelling. Gastrointestinal: Negative. Negative for abdominal pain, blood in stool, constipation, diarrhea, nausea and vomiting. Genitourinary: Negative. Negative for decreased urine volume, difficulty urinating, dysuria, enuresis, flank pain, frequency, genital sores, hematuria and urgency. Musculoskeletal:  Positive for arthralgias, back pain and gait problem. Negative for joint swelling, myalgias, neck pain and neck stiffness. Skin: Negative. Negative for color change, pallor, rash and wound. Allergic/Immunologic: Negative. Neurological:  Positive for weakness (Bilateral lower legs - intermittent). Negative for dizziness, tremors, seizures, syncope, facial asymmetry, speech difficulty, light-headedness, numbness and headaches. Psychiatric/Behavioral:  Negative for agitation, behavioral problems, confusion, decreased concentration, dysphoric mood, hallucinations, self-injury, sleep disturbance and suicidal ideas. The patient is not nervous/anxious and is not hyperactive. Objective:     Vitals:    01/11/23 1007   BP: 124/76   Site: Right Upper Arm   Position: Sitting   Cuff Size: Large Adult   Pulse: 91   SpO2: 97%   Weight: 275 lb (124.7 kg)   Height: 5' 2\" (1.575 m)     Wt Readings from Last 3 Encounters:   01/11/23 275 lb (124.7 kg)   12/16/22 275 lb (124.7 kg)   09/15/22 274 lb (124.3 kg)     Temp Readings from Last 3 Encounters:   05/17/19 98 °F (36.7 °C)   05/03/19 98.2 °F (36.8 °C)   04/22/19 98.4 °F (36.9 °C)     BP Readings from Last 3 Encounters:   01/11/23 124/76   09/15/22 130/78   03/23/22 132/76     Pulse Readings from Last 3 Encounters:   01/11/23 91   09/15/22 76   03/23/22 83     Physical Exam  Vitals and nursing note reviewed. Constitutional:       General: She is not in acute distress. Appearance: Normal appearance. She is well-developed. She is obese. She is not diaphoretic. HENT:      Head: Normocephalic and atraumatic. Right Ear: Tympanic membrane, ear canal and external ear normal. There is no impacted cerumen. Left Ear: Tympanic membrane, ear canal and external ear normal. There is no impacted cerumen. Nose: Nose normal. No congestion or rhinorrhea. Mouth/Throat:      Mouth: Mucous membranes are moist.      Pharynx: Oropharynx is clear. No oropharyngeal exudate or posterior oropharyngeal erythema. Eyes:      General: No scleral icterus. Right eye: No discharge. Left eye: No discharge. Conjunctiva/sclera: Conjunctivae normal.   Neck:      Vascular: No carotid bruit. Trachea: No tracheal deviation. Cardiovascular:      Rate and Rhythm: Normal rate and regular rhythm. Pulses: Normal pulses. Heart sounds: Normal heart sounds. No murmur heard. No friction rub. No gallop. Pulmonary:      Effort: Pulmonary effort is normal. No respiratory distress. Breath sounds: Normal breath sounds. No stridor. No wheezing, rhonchi or rales. Chest:      Chest wall: No tenderness. Abdominal:      General: Bowel sounds are normal. There is no distension. Palpations: Abdomen is soft. There is no mass. Tenderness: There is no abdominal tenderness. There is no guarding or rebound. Hernia: No hernia is present. Musculoskeletal:         General: No swelling, deformity or signs of injury. Cervical back: Normal range of motion and neck supple. No rigidity. No muscular tenderness. Lumbar back: Tenderness present. Decreased range of motion. Left hip: Tenderness present. Decreased range of motion. Decreased strength. Right lower leg: No edema. Left lower leg: No edema. Lymphadenopathy:      Cervical: No cervical adenopathy. Skin:     General: Skin is warm and dry. Capillary Refill: Capillary refill takes less than 2 seconds. Coloration: Skin is not jaundiced or pale. Findings: No bruising, erythema, lesion or rash. Neurological:      General: No focal deficit present. Mental Status: She is alert and oriented to person, place, and time. Mental status is at baseline. Cranial Nerves: No cranial nerve deficit. Sensory: No sensory deficit. Motor: No weakness or abnormal muscle tone. Coordination: Coordination normal.      Gait: Gait normal.      Deep Tendon Reflexes: Reflexes are normal and symmetric. Reflexes normal.   Psychiatric:         Mood and Affect: Mood normal.         Behavior: Behavior normal.         Thought Content:  Thought content normal.         Judgment: Judgment normal.       Nurse Only on 11/21/2022   Component Date Value Ref Range Status    Hemoglobin A1C 11/21/2022 8.3  See comment % Final    Comment: Comment:  Diagnosis of Diabetes: > or = 6.5%  Increased risk of diabetes (Prediabetes): 5.7-6.4%  Glycemic Control: Nonpregnant Adults: <7.0%                    Pregnant: <6.0%        eAG 11/21/2022 191.5  mg/dL Final    TSH 11/21/2022 1.91  0.27 - 4.20 uIU/mL Final    Vitamin B-12 11/21/2022 362  211 - 911 pg/mL Final    Folate 11/21/2022 7.02  4.78 - 24.20 ng/mL Final    Comment: Effective 11-15-16 10:00am EST  Please note reference ranges have  changed for Folate. Vit D, 25-Hydroxy 11/21/2022 99.7  >=30 ng/mL Final    Comment: <=20 ng/mL. ........... [facility] Deficient  21-29 ng/mL. ......... [facility] Insufficient  >=30 ng/mL. ........ [facility] Sufficient      Cholesterol, Total 11/21/2022 167  0 - 199 mg/dL Final    Triglycerides 11/21/2022 104  0 - 150 mg/dL Final    HDL 11/21/2022 59  40 - 60 mg/dL Final    Comment: An HDL cholesterol less than 40 mg/dL is low and  constitutes a coronary heart disease risk factor. An HDL cholesterol greater than 60 mg/dL is a  negative risk factor for coronary heart disease. LDL Calculated 11/21/2022 87  <100 mg/dL Final    VLDL Cholesterol Calculated 11/21/2022 21  Not Established mg/dL Final    Sodium 11/21/2022 144  136 - 145 mmol/L Final    Potassium 11/21/2022 4.8  3.5 - 5.1 mmol/L Final    Chloride 11/21/2022 103  99 - 110 mmol/L Final    CO2 11/21/2022 25  21 - 32 mmol/L Final    Anion Gap 11/21/2022 16  3 - 16 Final    Glucose 11/21/2022 149 (A)  70 - 99 mg/dL Final    BUN 11/21/2022 19  7 - 20 mg/dL Final    Creatinine 11/21/2022 0.8  0.6 - 1.2 mg/dL Final    Est, Glom Filt Rate 11/21/2022 >60  >60 Final    Comment: Pediatric calculator link  [email or web address] [email or web address]/kdoqi/gfr_[email or web address]  Effective Oct 3, 2022  These results are not intended for use in patients  <25years of age. eGFR results are calculated without  a race factor using the 2021 CKD-EPI equation. Careful  clinical correlation is recommended, particularly when  comparing to results calculated using previous equations.   The CKD-EPI equation is less accurate in patients with  extremes of muscle mass, extra-renal metabolism of  creatinine, excessive creatinine ingestion, or following  therapy that affects renal tubular secretion. Calcium 11/21/2022 9.9  8.3 - 10.6 mg/dL Final    Total Protein 11/21/2022 6.9  6.4 - 8.2 g/dL Final    Albumin 11/21/2022 4.3  3.4 - 5.0 g/dL Final    Albumin/Globulin Ratio 11/21/2022 1.7  1.1 - 2.2 Final    Total Bilirubin 11/21/2022 0.6  0.0 - 1.0 mg/dL Final    Alkaline Phosphatase 11/21/2022 109  40 - 129 U/L Final    ALT 11/21/2022 18  10 - 40 U/L Final    AST 11/21/2022 30  15 - 37 U/L Final    WBC 11/21/2022 10.3  4.0 - 11.0 K/uL Final    RBC 11/21/2022 4.89  4.00 - 5.20 M/uL Final    Hemoglobin 11/21/2022 13.8  12.0 - 16.0 g/dL Final    Hematocrit 11/21/2022 43.1  36.0 - 48.0 % Final    MCV 11/21/2022 88.2  80.0 - 100.0 fL Final    MCH 11/21/2022 28.2  26.0 - 34.0 pg Final    MCHC 11/21/2022 32.0  31.0 - 36.0 g/dL Final    RDW 11/21/2022 15.7 (A)  12.4 - 15.4 % Final    Platelets 68/60/8996 433  135 - 450 K/uL Final    MPV 11/21/2022 8.6  5.0 - 10.5 fL Final    Neutrophils % 11/21/2022 51.8  % Final    Lymphocytes % 11/21/2022 37.1  % Final    Monocytes % 11/21/2022 7.9  % Final    Eosinophils % 11/21/2022 2.9  % Final    Basophils % 11/21/2022 0.3  % Final    Neutrophils Absolute 11/21/2022 5.3  1.7 - 7.7 K/uL Final    Lymphocytes Absolute 11/21/2022 3.8  1.0 - 5.1 K/uL Final    Monocytes Absolute 11/21/2022 0.8  0.0 - 1.3 K/uL Final    Eosinophils Absolute 11/21/2022 0.3  0.0 - 0.6 K/uL Final    Basophils Absolute 11/21/2022 0.0  0.0 - 0.2 K/uL Final           Assessment & Plan: The following diagnoses and conditions are stable with no further orders unless indicated:  1. Type 2 diabetes mellitus with hyperglycemia, without long-term current use of insulin ([facility] [address]    2. Essential hypertension    3. Chronic bilateral low back pain without sciatica    4. Bilateral leg weakness    5. SI (sacroiliac) joint dysfunction    6.  Mixed hyperlipidemia    7. Class 3 severe obesity due to excess calories with serious comorbidity and body mass index (BMI) of 45.0 to 49.9 in [facility])        Elke Gaitan was seen today for diabetes and hyperlipidemia. Elke Gaitan is to continue with her follow up with spine specialist tomorrow. Recommend her work on diet and exercise. Also recommend weight loss. This most likely will help with her back pain along with her leg weakness. The pain that she is having on the lateral aspect of her left hip seems to be secondary to SI joint dysfunction. She was given some home physical therapy exercises to perform at home. Recommend her continue to work on physical therapy. Discussed with her her MRI results today. Did inform her that weight loss would probably help with her back pain. Recommend her continuing to try to increase her aerobic activity as tolerated. She may continue taking Tylenol and NSAIDs as necessary for her pain. Blood pressure stable. She is doing well with her current diabetic medications. Recommend her working on diet to help with her diabetes even more. Will call and try to get her diabetic eye exam updated in health maintenance today. She is doing well with her statin. Recommend her scheduling a follow-up in about 3 months. Elke Gaitan was seen today for diabetes, hyperlipidemia and hypertension. Diagnoses and all orders for this visit:    Type 2 diabetes mellitus with hyperglycemia, without long-term current use of insulin ([facility])  -     Diabetic Foot Exam  -     blood glucose test strips (FREESTYLE LITE) strip; 1 each by In Vitro route daily As needed.     Essential hypertension    Chronic bilateral low back pain without sciatica    Bilateral leg weakness    SI (sacroiliac) joint dysfunction    Mixed hyperlipidemia    Class 3 severe obesity due to excess calories with serious comorbidity and body mass index (BMI) of 45.0 to 49.9 in [facility])        Prior to Visit Medications    Medication Sig Taking? Authorizing Provider   blood glucose test strips (FREESTYLE LITE) strip 1 each by In Vitro route daily As needed. Yes JAYLEN Patterson CNP   metFORMIN (GLUCOPHAGE) 500 MG tablet TAKE TWO TABLETS BY MOUTH TWICE A DAY Yes JAYLEN Patterson CNP   glipiZIDE (GLUCOTROL XL) 10 MG extended release tablet Take 1 tablet by mouth in the morning and at bedtime Yes JAYLEN Patterson CNP   losartan (COZAAR) 25 MG tablet 1 po QD Yes JAYLEN Patterson CNP   simvastatin (ZOCOR) 40 MG tablet Take 1 tablet by mouth nightly Yes JAYLEN Patterson CNP   lidocaine (LIDODERM) 5 % Place 1 patch onto the skin daily 12 hours on, 12 hours off. Yes JAYLEN Patterson CNP   FreeStyle Lancets MISC 1 each by Does not apply route daily Yes JAYLEN Patterson CNP   glucose monitoring kit (FREESTYLE) monitoring kit 1 kit by Does not apply route daily Yes JAYLEN Patterson CNP   fluticasone (FLONASE) 50 MCG/ACT nasal spray 1 spray by Nasal route daily Yes JAYLEN Patterson CNP   cetirizine (ZYRTEC ALLERGY) 10 MG tablet Take 1 tablet by mouth daily Yes JAYLEN Patterson CNP   loratadine (CLARITIN) 10 MG tablet Take 1 tablet by mouth daily Yes JAYLEN Patterson CNP   vitamin D (CHOLECALCIFEROL) 1000 UNIT TABS tablet Take 1 tablet by mouth daily Yes JAYLEN Patterson CNP   aspirin 81 MG tablet Take 81 mg by mouth daily Yes Historical Provider, MD     Orders Placed This Encounter   Medications    blood glucose test strips (FREESTYLE LITE) strip     Si each by In Vitro route daily As needed. Dispense:  100 each     Refill:  3             Return in about 3 months (around 2023) for [facility] - 40 min . Patient should call the office immediately with new or ongoing signs or symptoms or worsening, or proceedto the emergency room.   No changes in past medical history, past surgical history, social history, or family history were noted during the patient encounter unless specifically listed above. All updates of past medicalhistory, past surgical history, social history, or family history were reviewed personally by me during the office visit. All problems listed in the assessment are stable unless noted otherwise. Medication profilereviewed personally by me during the office visit. Medication side effects and possible impairments from medications were discussed as applicable. Call if pattern of symptoms change or persists for an extended time. This document was prepared by a combination of typing and transcription through a voice recognition software. All medications have the potential for adverse effects. All medications effect each person differently. Please read and review provided information related to medication. If the medication that you have been prescribed has the potential to cause sedation, do not drive or operate car, truck, or heavy machinery until you know how the medication will effect you. If you experience any adverse effects from the medication, please call the office or report to the emergency department.

## 2023-01-12 ENCOUNTER — OFFICE VISIT (OUTPATIENT)
Dept: ORTHOPEDIC SURGERY | Age: 63
End: 2023-01-12
Payer: MEDICAID

## 2023-01-12 VITALS — WEIGHT: 275 LBS | HEIGHT: 62 IN | BODY MASS INDEX: 50.61 KG/M2

## 2023-01-12 DIAGNOSIS — M43.10 DEGENERATIVE SPONDYLOLISTHESIS: Primary | ICD-10-CM

## 2023-01-12 PROCEDURE — G8484 FLU IMMUNIZE NO ADMIN: HCPCS | Performed by: ORTHOPAEDIC SURGERY

## 2023-01-12 PROCEDURE — 3017F COLORECTAL CA SCREEN DOC REV: CPT | Performed by: ORTHOPAEDIC SURGERY

## 2023-01-12 PROCEDURE — G8417 CALC BMI ABV UP PARAM F/U: HCPCS | Performed by: ORTHOPAEDIC SURGERY

## 2023-01-12 PROCEDURE — 1036F TOBACCO NON-USER: CPT | Performed by: ORTHOPAEDIC SURGERY

## 2023-01-12 PROCEDURE — 99203 OFFICE O/P NEW LOW 30 MIN: CPT | Performed by: ORTHOPAEDIC SURGERY

## 2023-01-12 PROCEDURE — G8427 DOCREV CUR MEDS BY ELIG CLIN: HCPCS | Performed by: ORTHOPAEDIC SURGERY

## 2023-01-12 NOTE — PROGRESS NOTES
New Patient: LUMBAR SPINE    Referring Provider:  Magyg Gamez, *    CHIEF COMPLAINT:    Chief Complaint   Patient presents with    Lower Back Pain     Reports of lower back pain with bilateral leg weakness       HISTORY OF PRESENT ILLNESS:    Ms. Morenita Herrera  is a pleasant 62 y.o. female currently referred by JAYLEN Cochran for the evaluation of low back and bilateral leg pain her symptoms began 8 years ago and have increased since that time.  She rates her back pain 7/10 and leg pain 6/10.  She notes tingling in her arms and legs and weakness of her left leg.  She denies saddle anesthesia and bowel bladder abnormality other than urinary urgency.  She reports difficulty with her balance.    Current/Past Treatment:   Physical Therapy: Yes  Chiropractic: No  Injection: No  Medications: None    Past Medical History:   Past Medical History:   Diagnosis Date    Diabetes mellitus ([facility])     Fractures     Hyperlipidemia     Hypertension     Obesity     Seasonal allergies     Type 2 diabetes mellitus with hyperglycemia, without long-term current use of insulin ([facility]) 4/25/2017      Past Surgical History:     Past Surgical History:   Procedure Laterality Date    TONSILLECTOMY      TUBAL LIGATION  1983     Current Medications:     Current Outpatient Medications:     blood glucose test strips (FREESTYLE LITE) strip, 1 each by In Vitro route daily As needed., Disp: 100 each, Rfl: 3    metFORMIN (GLUCOPHAGE) 500 MG tablet, TAKE TWO TABLETS BY MOUTH TWICE A DAY, Disp: 120 tablet, Rfl: 1    glipiZIDE (GLUCOTROL XL) 10 MG extended release tablet, Take 1 tablet by mouth in the morning and at bedtime, Disp: 180 tablet, Rfl: 3    losartan (COZAAR) 25 MG tablet, 1 po QD, Disp: 90 tablet, Rfl: 1    simvastatin (ZOCOR) 40 MG tablet, Take 1 tablet by mouth nightly, Disp: 90 tablet, Rfl: 3    lidocaine (LIDODERM) 5 %, Place 1 patch onto the skin daily 12 hours on, 12 hours off., Disp: 30 patch, Rfl: 2    FreeStyle  Lancets MISC, 1 each by Does not apply route daily, Disp: 100 each, Rfl: 3    glucose monitoring kit (FREESTYLE) monitoring kit, 1 kit by Does not apply route daily, Disp: 1 kit, Rfl: 0    fluticasone (FLONASE) 50 MCG/ACT nasal spray, 1 spray by Nasal route daily, Disp: 1 Bottle, Rfl: 3    cetirizine (ZYRTEC ALLERGY) 10 MG tablet, Take 1 tablet by mouth daily, Disp: 90 tablet, Rfl: 3    loratadine (CLARITIN) 10 MG tablet, Take 1 tablet by mouth daily, Disp: 90 tablet, Rfl: 3    vitamin D (CHOLECALCIFEROL) 1000 UNIT TABS tablet, Take 1 tablet by mouth daily, Disp: 30 tablet, Rfl: 3    aspirin 81 MG tablet, Take 81 mg by mouth daily, Disp: , Rfl:   Allergies:  Patient has no known allergies. Social History:    reports that she has never smoked. She has never used smokeless tobacco. She reports that she does not drink alcohol and does not use drugs. Family History:   Family History   Problem Relation Age of Onset    High Blood Pressure Mother     Cancer Mother        REVIEW OF SYSTEMS: Full ROS noted & scanned   CONSTITUTIONAL: Denies unexplained weight loss, fevers, chills or fatigue  NEUROLOGICAL: Denies unsteady gait or progressive weakness  MUSCULOSKELETAL: Denies joint swelling or redness  PSYCHOLOGICAL: Denies anxiety, depression   SKIN: Denies skin changes, delayed healing, rash, itching   HEMATOLOGIC: Denies easy bleeding or bruising  ENDOCRINE: Denies excessive thirst, urination, heat/cold  RESPIRATORY: Denies current dyspnea, cough  GI: Denies nausea, vomiting, diarrhea   : Denies bowel or bladder issues      PHYSICAL EXAM:    Vitals: Height 5' 2\" (1.575 m), weight 275 lb (124.7 kg), not currently breastfeeding. GENERAL EXAM:  General Apparence: Patient is adequately groomed with no evidence of malnutrition. Orientation: The patient is oriented to time, place and person. Mood & Affect:The patient's mood and affect are appropriate.   Vascular: Examination reveals no swelling tenderness in upper or lower extremities. Good capillary refill. Lymphatic: The lymphatic examination bilaterally reveals all areas to be without enlargement or induration  Sensation: Sensation is intact without deficit  Coordination/Balance: Good coordination     LUMBAR/SACRAL EXAMINATION:  Inspection: Local inspection shows no step-off or bruising. Lumbar alignment is normal.  Sagittal and Coronal balance is neutral.      Palpation:   No evidence of tenderness at the midline. No tenderness bilaterally at the paraspinal or trochanters. There is no step-off or paraspinal spasm. Range of Motion: Lumbar flexion, extension and rotation are mildly limited due to pain. Strength:   Strength testing is 5/5 in all muscle groups tested. Special Tests:   Straight leg raise and crossed SLR negative. Leg length and pelvis level. Skin: There are no rashes, ulcerations or lesions. Reflexes: Reflexes are symmetrically 2+ at the patellar and ankle tendons. Clonus absent bilaterally at the feet. Gait & station: normal, patient ambulates without assistance    Additional Examinations:   RIGHT LOWER EXTREMITY: Inspection/examination of the right lower extremity does not show any tenderness, deformity or injury. Range of motion is unremarkable. There is no gross instability. There are no rashes, ulcerations or lesions. Strength and tone are normal.    LEFT LOWER EXTREMITY:  Inspection/examination of the left lower extremity does not show any tenderness, deformity or injury. Range of motion is unremarkable. There is no gross instability. There are no rashes, ulcerations or lesions. Strength and tone are normal.    Diagnostic Testing:    I reviewed MRI images of her lumbar spine from 12/16/2022 in the office today.   Those show a mild degenerative spondylosis thesis L4-L5 without significant nerve compression    Impression:   Degenerative spondylolisthesis    Plan:    Discussed treatment options including observation, physical therapy, epidural injection, and additional imaging. She consider chiropractic care or epidural injection. She will call if she wants a referral for injections.

## 2023-01-13 ENCOUNTER — TELEPHONE (OUTPATIENT)
Dept: ADMINISTRATIVE | Age: 63
End: 2023-01-13

## 2023-01-13 NOTE — TELEPHONE ENCOUNTER
PA submitted VIA [facility] for  FreeStyle Lite Test strips   (Key: Emmett Martínez) - [id number] PENDING    PA has been denied

## 2023-01-20 DIAGNOSIS — E11.65 TYPE 2 DIABETES MELLITUS WITH HYPERGLYCEMIA, WITHOUT LONG-TERM CURRENT USE OF INSULIN (HCC): Primary | ICD-10-CM

## 2023-01-20 RX ORDER — LANCETS 33 GAUGE
EACH MISCELLANEOUS
Qty: 100 EACH | Refills: 3 | Status: SHIPPED | OUTPATIENT
Start: 2023-01-20

## 2023-01-20 RX ORDER — BLOOD-GLUCOSE METER
1 EACH MISCELLANEOUS DAILY
Qty: 1 KIT | Refills: 0 | Status: SHIPPED | OUTPATIENT
Start: 2023-01-20

## 2023-01-20 RX ORDER — BLOOD SUGAR DIAGNOSTIC
1 STRIP MISCELLANEOUS DAILY
Qty: 100 EACH | Refills: 3 | Status: SHIPPED | OUTPATIENT
Start: 2023-01-20

## 2023-01-20 NOTE — TELEPHONE ENCOUNTER
Call to patient and Pau about diabetic testing supplies. Patient unsure what is covered. Pharmacy at [facility] checked , the One Touch Ultra 2 meter is currently covered. Order sent for meter, strips and lancets. Patient called and Jone Santiago. Will await to see that supplies are covered. Plan: Will call patient on Monday.

## 2023-01-25 ENCOUNTER — TELEPHONE (OUTPATIENT)
Dept: ADMINISTRATIVE | Age: 63
End: 2023-01-25

## 2023-01-25 NOTE — TELEPHONE ENCOUNTER
PA submitted VIA [facility] for  OneTouch Ultra 2 w/Device kit (Ascencio: Mayito Pressley) - [id number]  PENDING

## 2023-03-14 DIAGNOSIS — E11.9 TYPE 2 DIABETES MELLITUS WITHOUT COMPLICATION, WITHOUT LONG-TERM CURRENT USE OF INSULIN (HCC): ICD-10-CM

## 2023-03-14 DIAGNOSIS — I10 ESSENTIAL HYPERTENSION: ICD-10-CM

## 2023-03-14 RX ORDER — LOSARTAN POTASSIUM 25 MG/1
TABLET ORAL
Qty: 90 TABLET | Refills: 2 | Status: SHIPPED | OUTPATIENT
Start: 2023-03-14

## 2023-04-17 ENCOUNTER — TELEPHONE (OUTPATIENT)
Dept: FAMILY MEDICINE CLINIC | Age: 63
End: 2023-04-17

## 2023-04-17 NOTE — TELEPHONE ENCOUNTER
Lauro Khan RN /[facility]     SUMMARY:    Met with patient in office to introduce RN and inform patient of [facility] coordinated care. Spoke to patient to review labs and recommendations:  A1C has actually worsened. Recommend her starting on Ozempic as discussed. She is waiting on PA. PCP Recommend improving diet and exercise. She was able to get [facility] [place] [place] and reader. Patient brought in the Free Style 2 sensor and  reader. RN instructed her  on both and assisted in application to R under arm. BMP reveals kidney fxn stable. Electrolytes stable. PCP states Vitamin D is WNL. Only recommend taking vitamin D3 supplement of 1,000 units at this time. Patient verbalized good understanding of instructions. Current Outpatient Medications   Medication Sig Dispense Refill    Continuous Blood Gluc  (FREESTYLE AFSHIN 2 READER) MELYSSA 1 Units by Does not apply route continuous 1 each 0    Continuous Blood Gluc Sensor (FREESTYLE AFSHIN 2 SENSOR) MISC 1 Units by Does not apply route continuous Change every 14 days 2 each 5    blood glucose test strips (ONETOUCH ULTRA) strip 1 each by In Vitro route daily Testing once a day. 100 each 1    Semaglutide,0.25 or 0.5MG/DOS, (OZEMPIC, 0.25 OR 0.5 MG/DOSE,) 2 MG/1.5ML SOPN Inject 0.25 mg into the skin one time a week for 4 weeks.   Call office when you have one pen left for a higher dose if tolerating 4 Adjustable Dose Pre-filled Pen Syringe 0    metFORMIN (GLUCOPHAGE) 500 MG tablet Take 1 tablet by mouth 2 times daily (with meals) 360 tablet 1    losartan (COZAAR) 25 MG tablet TAKE ONE TABLET BY MOUTH DAILY 90 tablet 2    OneTouch Delica Lancets 86P MISC Use once daily 100 each 3    Blood Glucose Monitoring Suppl (ONE TOUCH ULTRA 2) w/Device KIT 1 kit by Does not apply route daily Testing once a day 1 kit 0    glipiZIDE (GLUCOTROL XL) 10 MG extended release tablet Take 1 tablet by mouth in the morning and at bedtime 180 tablet 3

## 2023-06-29 ENCOUNTER — TELEPHONE (OUTPATIENT)
Dept: FAMILY MEDICINE CLINIC | Age: 63
End: 2023-06-29

## 2023-07-16 ASSESSMENT — ENCOUNTER SYMPTOMS
EYE DISCHARGE: 0
STRIDOR: 0
BLOOD IN STOOL: 0
CHEST TIGHTNESS: 0
ABDOMINAL PAIN: 0
RHINORRHEA: 0
NAUSEA: 0
EYE PAIN: 0
COLOR CHANGE: 0
WHEEZING: 0
APNEA: 0
VOMITING: 0
ALLERGIC/IMMUNOLOGIC NEGATIVE: 1
CONSTIPATION: 0
SHORTNESS OF BREATH: 0
RESPIRATORY NEGATIVE: 1
GASTROINTESTINAL NEGATIVE: 1
SINUS PRESSURE: 0
EYE REDNESS: 0
BACK PAIN: 1
CHOKING: 0
SORE THROAT: 0
COUGH: 0
TROUBLE SWALLOWING: 0
EYE ITCHING: 0
PHOTOPHOBIA: 0
DIARRHEA: 0

## 2023-07-17 ENCOUNTER — OFFICE VISIT (OUTPATIENT)
Dept: FAMILY MEDICINE CLINIC | Age: 63
End: 2023-07-17
Payer: MEDICAID

## 2023-07-17 VITALS
DIASTOLIC BLOOD PRESSURE: 68 MMHG | HEIGHT: 62 IN | WEIGHT: 269 LBS | SYSTOLIC BLOOD PRESSURE: 126 MMHG | OXYGEN SATURATION: 98 % | BODY MASS INDEX: 49.5 KG/M2 | HEART RATE: 79 BPM

## 2023-07-17 DIAGNOSIS — E78.2 MIXED HYPERLIPIDEMIA: ICD-10-CM

## 2023-07-17 DIAGNOSIS — M54.50 CHRONIC BILATERAL LOW BACK PAIN WITHOUT SCIATICA: ICD-10-CM

## 2023-07-17 DIAGNOSIS — I10 ESSENTIAL HYPERTENSION: ICD-10-CM

## 2023-07-17 DIAGNOSIS — E11.65 TYPE 2 DIABETES MELLITUS WITH HYPERGLYCEMIA, WITHOUT LONG-TERM CURRENT USE OF INSULIN (HCC): Primary | ICD-10-CM

## 2023-07-17 DIAGNOSIS — G89.29 CHRONIC BILATERAL LOW BACK PAIN WITHOUT SCIATICA: ICD-10-CM

## 2023-07-17 DIAGNOSIS — E66.01 CLASS 3 SEVERE OBESITY DUE TO EXCESS CALORIES WITH SERIOUS COMORBIDITY AND BODY MASS INDEX (BMI) OF 45.0 TO 49.9 IN ADULT (HCC): ICD-10-CM

## 2023-07-17 LAB
ANION GAP SERPL CALCULATED.3IONS-SCNC: 14 MMOL/L (ref 3–16)
BUN SERPL-MCNC: 14 MG/DL (ref 7–20)
CALCIUM SERPL-MCNC: 10.4 MG/DL (ref 8.3–10.6)
CHLORIDE SERPL-SCNC: 105 MMOL/L (ref 99–110)
CO2 SERPL-SCNC: 25 MMOL/L (ref 21–32)
CREAT SERPL-MCNC: 0.9 MG/DL (ref 0.6–1.2)
GFR SERPLBLD CREATININE-BSD FMLA CKD-EPI: >60 ML/MIN/{1.73_M2}
GLUCOSE SERPL-MCNC: 125 MG/DL (ref 70–99)
POTASSIUM SERPL-SCNC: 4.8 MMOL/L (ref 3.5–5.1)
SODIUM SERPL-SCNC: 144 MMOL/L (ref 136–145)

## 2023-07-17 PROCEDURE — 3078F DIAST BP <80 MM HG: CPT | Performed by: NURSE PRACTITIONER

## 2023-07-17 PROCEDURE — 99214 OFFICE O/P EST MOD 30 MIN: CPT | Performed by: NURSE PRACTITIONER

## 2023-07-17 PROCEDURE — G8417 CALC BMI ABV UP PARAM F/U: HCPCS | Performed by: NURSE PRACTITIONER

## 2023-07-17 PROCEDURE — 2022F DILAT RTA XM EVC RTNOPTHY: CPT | Performed by: NURSE PRACTITIONER

## 2023-07-17 PROCEDURE — 36415 COLL VENOUS BLD VENIPUNCTURE: CPT | Performed by: NURSE PRACTITIONER

## 2023-07-17 PROCEDURE — 3017F COLORECTAL CA SCREEN DOC REV: CPT | Performed by: NURSE PRACTITIONER

## 2023-07-17 PROCEDURE — G8427 DOCREV CUR MEDS BY ELIG CLIN: HCPCS | Performed by: NURSE PRACTITIONER

## 2023-07-17 PROCEDURE — 3052F HG A1C>EQUAL 8.0%<EQUAL 9.0%: CPT | Performed by: NURSE PRACTITIONER

## 2023-07-17 PROCEDURE — 1036F TOBACCO NON-USER: CPT | Performed by: NURSE PRACTITIONER

## 2023-07-17 PROCEDURE — 3074F SYST BP LT 130 MM HG: CPT | Performed by: NURSE PRACTITIONER

## 2023-07-17 RX ORDER — DULAGLUTIDE 0.75 MG/.5ML
0.75 INJECTION, SOLUTION SUBCUTANEOUS WEEKLY
Qty: 4 ADJUSTABLE DOSE PRE-FILLED PEN SYRINGE | Refills: 0 | Status: SHIPPED | OUTPATIENT
Start: 2023-07-17

## 2023-07-17 RX ORDER — GLIPIZIDE 10 MG/1
10 TABLET, FILM COATED, EXTENDED RELEASE ORAL DAILY
Qty: 90 TABLET | Refills: 1 | Status: SHIPPED
Start: 2023-07-17

## 2023-07-18 LAB
EST. AVERAGE GLUCOSE BLD GHB EST-MCNC: 145.6 MG/DL
HBA1C MFR BLD: 6.7 %

## 2023-08-09 ENCOUNTER — TELEPHONE (OUTPATIENT)
Dept: FAMILY MEDICINE CLINIC | Age: 63
End: 2023-08-09

## 2023-08-09 DIAGNOSIS — E11.65 TYPE 2 DIABETES MELLITUS WITH HYPERGLYCEMIA, WITHOUT LONG-TERM CURRENT USE OF INSULIN (HCC): ICD-10-CM

## 2023-08-09 RX ORDER — GLIPIZIDE 5 MG/1
5 TABLET, FILM COATED, EXTENDED RELEASE ORAL DAILY
Qty: 30 TABLET | Refills: 3 | Status: SHIPPED | OUTPATIENT
Start: 2023-08-09 | End: 2023-09-08 | Stop reason: ALTCHOICE

## 2023-08-09 NOTE — TELEPHONE ENCOUNTER
Pt called to let you know that she is doing really good on the Trulicity. Blood sugars have been running around 110 last 2 weeks but drops at night to around 69. Said that she did what you told her to do, and if you want her to continue with the Trulicity she will need another script sent to [email or web address].

## 2023-09-06 DIAGNOSIS — E11.65 TYPE 2 DIABETES MELLITUS WITH HYPERGLYCEMIA, WITHOUT LONG-TERM CURRENT USE OF INSULIN (HCC): ICD-10-CM

## 2023-09-07 DIAGNOSIS — E11.65 TYPE 2 DIABETES MELLITUS WITH HYPERGLYCEMIA, WITHOUT LONG-TERM CURRENT USE OF INSULIN (HCC): ICD-10-CM

## 2023-09-07 NOTE — TELEPHONE ENCOUNTER
Refill Request     CONFIRM preferrred pharmacy with the patient. If Mail Order Rx - Pend for 90 day refill. Last Seen: Last Seen Department: 7/17/2023  Last Seen by PCP: 7/17/2023    Last Written: 4/14/23    If no future appointment scheduled, route STAFF MESSAGE with patient name to the [facility] for scheduling. Next Appointment:   Future Appointments   Date Time Provider [address]   10/17/2023  9:20 AM JAYLEN Apple - CNP [place]  [place] - [place]       Message sent to [address] [place] [address] to schedule appt with patient?   N/A      Requested Prescriptions     Pending Prescriptions Disp Refills    Continuous Blood Gluc Sensor (FREESTYLE AFSHIN 2 SENSOR) MISC [Pharmacy Med Name: FREESTYLE AFSHIN 2 SENSOR]       Sig: USE AS DIRECTED AND REPLACE EVERY 14 DAYS

## 2023-09-08 RX ORDER — DULAGLUTIDE 3 MG/.5ML
INJECTION, SOLUTION SUBCUTANEOUS
Qty: 4 ADJUSTABLE DOSE PRE-FILLED PEN SYRINGE | Refills: 1 | Status: SHIPPED | OUTPATIENT
Start: 2023-09-08

## 2023-09-08 NOTE — TELEPHONE ENCOUNTER
[phone] ([employer]) Called pt back and she says, yes, she is taking the medication. Pt states that the first week of taking the medication  on the higher dose, she had diarrhea, And on the next day she had a fever and felt sick for 25 hours, but went away and is now fine. Pt states now when taking medication she has upset stomach for approx 1 hour and then is fine. Pt states she believes she is ready to start higher dose. Pt is asking if you plan on taking her off the glipizide all together. Pt states her blood sugars are running in the 90s during the daytime.

## 2023-09-08 NOTE — TELEPHONE ENCOUNTER
Refill Request     CONFIRM preferrred pharmacy with the patient. If Mail Order Rx - Pend for 90 day refill. Last Seen: Last Seen Department: 7/17/2023  Last Seen by PCP: 7/17/2023    Last Written: 8-9-2023    If no future appointment scheduled, route STAFF MESSAGE with patient name to the [facility] for scheduling. Next Appointment:   Future Appointments   Date Time Provider [facility] [address]   10/17/2023  9:20 AM JAYLEN Adames CNP [place]  [place] - [place]       Message sent to [address] [place] [address] to schedule appt with patient?   N/A      Requested Prescriptions     Pending Prescriptions Disp Refills    TRULICITY 1.5 HQ/9.1CE SC injection [Pharmacy Med Name: TRULICITY 1.5 YK/8.0 ML PEN] 2 mL      Sig: INJECT 1.5 MG UNDER THE SKIN ONCE WEEKLY

## 2023-09-20 DIAGNOSIS — E11.65 TYPE 2 DIABETES MELLITUS WITH HYPERGLYCEMIA, WITHOUT LONG-TERM CURRENT USE OF INSULIN (HCC): ICD-10-CM

## 2023-09-20 NOTE — TELEPHONE ENCOUNTER
Refill Request     CONFIRM preferrred pharmacy with the patient. If Mail Order Rx - Pend for 90 day refill. Last Seen: Last Seen Department: 7/17/2023  Last Seen by PCP: 7/17/2023    Last Written: 4/13/23    If no future appointment scheduled, route STAFF MESSAGE with patient name to the [facility] for scheduling. Next Appointment:   Future Appointments   Date Time Provider [facility] [address]   10/17/2023  9:20 AM JAYLEN Gutierrez - CNP [place]  [place] - [place]       Message sent to [address] [place] [address] to schedule appt with patient?   N/A      Requested Prescriptions     Pending Prescriptions Disp Refills    metFORMIN (GLUCOPHAGE) 500 MG tablet [Pharmacy Med Name: metFORMIN  MG TABLET] 360 tablet 1     Sig: TAKE TWO TABLETS BY MOUTH TWICE A DAY

## 2023-10-16 ASSESSMENT — ENCOUNTER SYMPTOMS
CONSTIPATION: 0
SORE THROAT: 0
COLOR CHANGE: 0
GASTROINTESTINAL NEGATIVE: 1
CHEST TIGHTNESS: 0
EYE PAIN: 0
EYE ITCHING: 0
STRIDOR: 0
BLOOD IN STOOL: 0
WHEEZING: 0
ABDOMINAL PAIN: 0
CHOKING: 0
BACK PAIN: 1
SINUS PRESSURE: 0
NAUSEA: 0
RESPIRATORY NEGATIVE: 1
TROUBLE SWALLOWING: 0
ALLERGIC/IMMUNOLOGIC NEGATIVE: 1
RHINORRHEA: 0
DIARRHEA: 0
VOMITING: 0
APNEA: 0
PHOTOPHOBIA: 0
SHORTNESS OF BREATH: 0
EYE DISCHARGE: 0
EYE REDNESS: 0
COUGH: 0

## 2023-10-16 NOTE — PROGRESS NOTES
UNIT TABS tablet Take 1 tablet by mouth daily Yes Jessica Gamez Silence, APRN - CNP   aspirin 81 MG tablet Take 1 tablet by mouth daily Yes Provider, MD Fabricio     Orders Placed This Encounter   Medications    atorvastatin (LIPITOR) 40 MG tablet     Sig: Take 1 tablet by mouth daily     Dispense:  90 tablet     Refill:  2    glipiZIDE (GLUCOTROL XL) 5 MG extended release tablet     Sig: Take 1 tablet by mouth daily     Dispense:  90 tablet     Refill:  1       Return in about 6 months (around 4/17/2024) for [facility] - 40 min appt . Patient should call the office immediately with new or ongoing signs or symptoms or worsening, or proceedto the emergency room. No changes in past medical history, past surgical history, social history, or family history were noted during the patient encounter unless specifically listed above. All updates of past medicalhistory, past surgical history, social history, or family history were reviewed personally by me during the office visit. All problems listed in the assessment are stable unless noted otherwise. Medication profilereviewed personally by me during the office visit. Medication side effects and possible impairments from medications were discussed as applicable. Call if pattern of symptoms change or persists for an extended time. This document was prepared by a combination of typing and transcription through a voice recognition software. All medications have the potential for adverse effects. All medications effect each person differently. Please read and review provided information related to medication. If the medication that you have been prescribed has the potential to cause sedation, do not drive or operate car, truck, or heavy machinery until you know how the medication will effect you. If you experience any adverse effects from the medication, please call the office or report to the emergency department.

## 2023-10-17 ENCOUNTER — OFFICE VISIT (OUTPATIENT)
Dept: FAMILY MEDICINE CLINIC | Age: 63
End: 2023-10-17
Payer: MEDICAID

## 2023-10-17 VITALS
BODY MASS INDEX: 47.66 KG/M2 | DIASTOLIC BLOOD PRESSURE: 84 MMHG | SYSTOLIC BLOOD PRESSURE: 135 MMHG | HEART RATE: 79 BPM | WEIGHT: 259 LBS | OXYGEN SATURATION: 97 % | HEIGHT: 62 IN

## 2023-10-17 DIAGNOSIS — R82.90 ABNORMAL URINALYSIS: ICD-10-CM

## 2023-10-17 DIAGNOSIS — E11.65 TYPE 2 DIABETES MELLITUS WITH HYPERGLYCEMIA, WITHOUT LONG-TERM CURRENT USE OF INSULIN (HCC): Primary | ICD-10-CM

## 2023-10-17 DIAGNOSIS — G89.29 CHRONIC BILATERAL LOW BACK PAIN WITHOUT SCIATICA: ICD-10-CM

## 2023-10-17 DIAGNOSIS — Z78.9 HEPATITIS B VACCINATION STATUS UNKNOWN: ICD-10-CM

## 2023-10-17 DIAGNOSIS — E78.2 MIXED HYPERLIPIDEMIA: ICD-10-CM

## 2023-10-17 DIAGNOSIS — E66.01 CLASS 3 SEVERE OBESITY DUE TO EXCESS CALORIES WITH SERIOUS COMORBIDITY AND BODY MASS INDEX (BMI) OF 45.0 TO 49.9 IN ADULT (HCC): ICD-10-CM

## 2023-10-17 DIAGNOSIS — I10 ESSENTIAL HYPERTENSION: ICD-10-CM

## 2023-10-17 DIAGNOSIS — M54.50 CHRONIC BILATERAL LOW BACK PAIN WITHOUT SCIATICA: ICD-10-CM

## 2023-10-17 DIAGNOSIS — E55.9 VITAMIN D DEFICIENCY: ICD-10-CM

## 2023-10-17 LAB
BILIRUBIN, POC: ABNORMAL
BLOOD URINE, POC: ABNORMAL
CLARITY, POC: ABNORMAL
COLOR, POC: ABNORMAL
GLUCOSE URINE, POC: ABNORMAL
KETONES, POC: ABNORMAL
LEUKOCYTE EST, POC: ABNORMAL
NITRITE, POC: POSITIVE
PH, POC: 6
PROTEIN, POC: ABNORMAL
SPECIFIC GRAVITY, POC: 1.02
UROBILINOGEN, POC: 4

## 2023-10-17 PROCEDURE — 99214 OFFICE O/P EST MOD 30 MIN: CPT | Performed by: NURSE PRACTITIONER

## 2023-10-17 PROCEDURE — G8484 FLU IMMUNIZE NO ADMIN: HCPCS | Performed by: NURSE PRACTITIONER

## 2023-10-17 PROCEDURE — 36415 COLL VENOUS BLD VENIPUNCTURE: CPT | Performed by: NURSE PRACTITIONER

## 2023-10-17 PROCEDURE — 1036F TOBACCO NON-USER: CPT | Performed by: NURSE PRACTITIONER

## 2023-10-17 PROCEDURE — 2022F DILAT RTA XM EVC RTNOPTHY: CPT | Performed by: NURSE PRACTITIONER

## 2023-10-17 PROCEDURE — 81002 URINALYSIS NONAUTO W/O SCOPE: CPT | Performed by: NURSE PRACTITIONER

## 2023-10-17 PROCEDURE — G8417 CALC BMI ABV UP PARAM F/U: HCPCS | Performed by: NURSE PRACTITIONER

## 2023-10-17 PROCEDURE — 3079F DIAST BP 80-89 MM HG: CPT | Performed by: NURSE PRACTITIONER

## 2023-10-17 PROCEDURE — G8427 DOCREV CUR MEDS BY ELIG CLIN: HCPCS | Performed by: NURSE PRACTITIONER

## 2023-10-17 PROCEDURE — 3075F SYST BP GE 130 - 139MM HG: CPT | Performed by: NURSE PRACTITIONER

## 2023-10-17 PROCEDURE — 3044F HG A1C LEVEL LT 7.0%: CPT | Performed by: NURSE PRACTITIONER

## 2023-10-17 PROCEDURE — 3017F COLORECTAL CA SCREEN DOC REV: CPT | Performed by: NURSE PRACTITIONER

## 2023-10-17 RX ORDER — ATORVASTATIN CALCIUM 40 MG/1
40 TABLET, FILM COATED ORAL DAILY
Qty: 90 TABLET | Refills: 2 | Status: SHIPPED | OUTPATIENT
Start: 2023-10-17

## 2023-10-17 RX ORDER — GLIPIZIDE 5 MG/1
5 TABLET, FILM COATED, EXTENDED RELEASE ORAL DAILY
Qty: 90 TABLET | Refills: 1 | Status: SHIPPED | OUTPATIENT
Start: 2023-10-17 | End: 2023-10-18 | Stop reason: ALTCHOICE

## 2023-10-18 DIAGNOSIS — E53.8 B12 DEFICIENCY: Primary | ICD-10-CM

## 2023-10-18 DIAGNOSIS — E53.8 FOLIC ACID DEFICIENCY: ICD-10-CM

## 2023-10-18 DIAGNOSIS — E78.2 MIXED HYPERLIPIDEMIA: ICD-10-CM

## 2023-10-18 DIAGNOSIS — R80.1 PERSISTENT PROTEINURIA: ICD-10-CM

## 2023-10-18 DIAGNOSIS — E11.65 TYPE 2 DIABETES MELLITUS WITH HYPERGLYCEMIA, WITHOUT LONG-TERM CURRENT USE OF INSULIN (HCC): ICD-10-CM

## 2023-10-18 DIAGNOSIS — I10 ESSENTIAL HYPERTENSION: ICD-10-CM

## 2023-10-18 LAB
25(OH)D3 SERPL-MCNC: 55.8 NG/ML
ALBUMIN SERPL-MCNC: 4.6 G/DL (ref 3.4–5)
ALBUMIN/GLOB SERPL: 1.9 {RATIO} (ref 1.1–2.2)
ALP SERPL-CCNC: 98 U/L (ref 40–129)
ALT SERPL-CCNC: 15 U/L (ref 10–40)
ANION GAP SERPL CALCULATED.3IONS-SCNC: 14 MMOL/L (ref 3–16)
AST SERPL-CCNC: 20 U/L (ref 15–37)
BASOPHILS # BLD: 0.2 K/UL (ref 0–0.2)
BASOPHILS NFR BLD: 2 %
BILIRUB SERPL-MCNC: 0.7 MG/DL (ref 0–1)
BUN SERPL-MCNC: 14 MG/DL (ref 7–20)
CALCIUM SERPL-MCNC: 10 MG/DL (ref 8.3–10.6)
CHLORIDE SERPL-SCNC: 102 MMOL/L (ref 99–110)
CHOLEST SERPL-MCNC: 147 MG/DL (ref 0–199)
CO2 SERPL-SCNC: 26 MMOL/L (ref 21–32)
CREAT SERPL-MCNC: 0.9 MG/DL (ref 0.6–1.2)
CREAT UR-MCNC: 170.8 MG/DL (ref 28–259)
DEPRECATED RDW RBC AUTO: 16.3 % (ref 12.4–15.4)
EOSINOPHIL # BLD: 0.5 K/UL (ref 0–0.6)
EOSINOPHIL NFR BLD: 4 %
EST. AVERAGE GLUCOSE BLD GHB EST-MCNC: 134.1 MG/DL
FOLATE SERPL-MCNC: 4.18 NG/ML (ref 4.78–24.2)
GFR SERPLBLD CREATININE-BSD FMLA CKD-EPI: >60 ML/MIN/{1.73_M2}
GLUCOSE SERPL-MCNC: 75 MG/DL (ref 70–99)
HBA1C MFR BLD: 6.3 %
HBV SURFACE AB SERPL IA-ACNC: <3.5 MIU/ML
HCT VFR BLD AUTO: 44.3 % (ref 36–48)
HDLC SERPL-MCNC: 63 MG/DL (ref 40–60)
HGB BLD-MCNC: 14.2 G/DL (ref 12–16)
LDLC SERPL CALC-MCNC: 63 MG/DL
LYMPHOCYTES # BLD: 4.6 K/UL (ref 1–5.1)
LYMPHOCYTES NFR BLD: 40 %
MCH RBC QN AUTO: 27.2 PG (ref 26–34)
MCHC RBC AUTO-ENTMCNC: 32.1 G/DL (ref 31–36)
MCV RBC AUTO: 84.6 FL (ref 80–100)
MICROALBUMIN UR DL<=1MG/L-MCNC: 2.3 MG/DL
MICROALBUMIN/CREAT UR: 13.5 MG/G (ref 0–30)
MONOCYTES # BLD: 0.8 K/UL (ref 0–1.3)
MONOCYTES NFR BLD: 7 %
NEUTROPHILS # BLD: 5.4 K/UL (ref 1.7–7.7)
NEUTROPHILS NFR BLD: 47 %
PLATELET # BLD AUTO: 458 K/UL (ref 135–450)
PMV BLD AUTO: 8.4 FL (ref 5–10.5)
POTASSIUM SERPL-SCNC: 5.2 MMOL/L (ref 3.5–5.1)
PROT SERPL-MCNC: 7 G/DL (ref 6.4–8.2)
RBC # BLD AUTO: 5.23 M/UL (ref 4–5.2)
SLIDE REVIEW: ABNORMAL
SMUDGE CELLS BLD QL SMEAR: PRESENT
SODIUM SERPL-SCNC: 142 MMOL/L (ref 136–145)
TRIGL SERPL-MCNC: 107 MG/DL (ref 0–150)
TSH SERPL DL<=0.005 MIU/L-ACNC: 1.84 UIU/ML (ref 0.27–4.2)
VIT B12 SERPL-MCNC: 275 PG/ML (ref 211–911)
VLDLC SERPL CALC-MCNC: 21 MG/DL
WBC # BLD AUTO: 11.4 K/UL (ref 4–11)

## 2023-10-18 RX ORDER — PYRIDOXINE HCL (VITAMIN B6) 50 MG
100 TABLET ORAL DAILY
Qty: 90 TABLET | Refills: 1 | Status: SHIPPED | OUTPATIENT
Start: 2023-10-18

## 2023-10-18 RX ORDER — FOLIC ACID 1 MG/1
1 TABLET ORAL DAILY
Qty: 90 TABLET | Refills: 1 | Status: SHIPPED | OUTPATIENT
Start: 2023-10-18

## 2023-10-19 LAB
BACTERIA UR CULT: ABNORMAL
ORGANISM: ABNORMAL

## 2023-10-26 ENCOUNTER — TELEPHONE (OUTPATIENT)
Dept: FAMILY MEDICINE CLINIC | Age: 63
End: 2023-10-26

## 2023-10-26 ENCOUNTER — NURSE ONLY (OUTPATIENT)
Dept: FAMILY MEDICINE CLINIC | Age: 63
End: 2023-10-26
Payer: MEDICAID

## 2023-10-26 DIAGNOSIS — R82.90 ABNORMAL URINALYSIS: Primary | ICD-10-CM

## 2023-10-26 LAB
BILIRUBIN, POC: ABNORMAL
BLOOD URINE, POC: ABNORMAL
CLARITY, POC: CLEAR
COLOR, POC: YELLOW
GLUCOSE URINE, POC: >=1000
KETONES, POC: ABNORMAL
LEUKOCYTE EST, POC: ABNORMAL
NITRITE, POC: ABNORMAL
PH, POC: 5.5
PROTEIN, POC: ABNORMAL
SPECIFIC GRAVITY, POC: 1.02
UROBILINOGEN, POC: 1

## 2023-10-26 PROCEDURE — 81002 URINALYSIS NONAUTO W/O SCOPE: CPT | Performed by: NURSE PRACTITIONER

## 2023-10-26 NOTE — TELEPHONE ENCOUNTER
Pt dropped of BP readings for the past week. BP has been running in the 130s/80s. See attached for exact readings.

## 2023-10-26 NOTE — TELEPHONE ENCOUNTER
Blood pressure stable. No changes in Bp medication at this time.   Recommend checking BP about once a week with goal BP less than 130/80 if possible with having DM

## 2023-11-04 DIAGNOSIS — E11.65 TYPE 2 DIABETES MELLITUS WITH HYPERGLYCEMIA, WITHOUT LONG-TERM CURRENT USE OF INSULIN (HCC): ICD-10-CM

## 2023-11-06 RX ORDER — DULAGLUTIDE 3 MG/.5ML
INJECTION, SOLUTION SUBCUTANEOUS
Qty: 2 ML | Refills: 2 | Status: SHIPPED | OUTPATIENT
Start: 2023-11-06 | End: 2023-12-21 | Stop reason: ALTCHOICE

## 2023-11-06 NOTE — TELEPHONE ENCOUNTER
Refill Request     CONFIRM preferrred pharmacy with the patient.    If Mail Order Rx - Pend for 90 day refill.      Last Seen: Last Seen Department: 10/17/2023  Last Seen by PCP: 10/17/2023    Last Written: 9/8/2023    If no future appointment scheduled, route STAFF MESSAGE with patient name to the  Pool for scheduling.      Next Appointment:   Future Appointments   Date Time Provider Department Center   12/19/2023  9:20 AM SCHEDULE, [facility]  [place] - [place]   12/21/2023  9:20 AM Maggy Gamez [facility]N [facility] [place] [facility]   4/16/2024  9:00 AM Maggy Gamez [facility] [place] [facility]       Message sent to  to schedule appt with patient?  NO      Requested Prescriptions     Pending Prescriptions Disp Refills    Dulaglutide (TRULICITY) 3 MG/0.5ML SOPN [Pharmacy Med Name: TRULICITY 3 MG/0.5 ML PEN] 2 mL      Sig: INJECT 3 MG UNDER THE SKIN ONCE WEEKLY

## 2023-12-20 DIAGNOSIS — E53.8 FOLIC ACID DEFICIENCY: ICD-10-CM

## 2023-12-21 ENCOUNTER — TELEMEDICINE (OUTPATIENT)
Dept: FAMILY MEDICINE CLINIC | Age: 63
End: 2023-12-21
Payer: MEDICAID

## 2023-12-21 DIAGNOSIS — I10 ESSENTIAL HYPERTENSION: ICD-10-CM

## 2023-12-21 DIAGNOSIS — E11.65 TYPE 2 DIABETES MELLITUS WITH HYPERGLYCEMIA, WITHOUT LONG-TERM CURRENT USE OF INSULIN (HCC): Primary | ICD-10-CM

## 2023-12-21 DIAGNOSIS — E53.8 B12 DEFICIENCY: ICD-10-CM

## 2023-12-21 DIAGNOSIS — E53.8 FOLIC ACID DEFICIENCY: ICD-10-CM

## 2023-12-21 DIAGNOSIS — E55.9 VITAMIN D DEFICIENCY: ICD-10-CM

## 2023-12-21 DIAGNOSIS — E78.2 MIXED HYPERLIPIDEMIA: ICD-10-CM

## 2023-12-21 DIAGNOSIS — E66.01 CLASS 3 SEVERE OBESITY DUE TO EXCESS CALORIES WITH SERIOUS COMORBIDITY AND BODY MASS INDEX (BMI) OF 45.0 TO 49.9 IN ADULT (HCC): ICD-10-CM

## 2023-12-21 PROCEDURE — G8427 DOCREV CUR MEDS BY ELIG CLIN: HCPCS | Performed by: NURSE PRACTITIONER

## 2023-12-21 PROCEDURE — 2022F DILAT RTA XM EVC RTNOPTHY: CPT | Performed by: NURSE PRACTITIONER

## 2023-12-21 PROCEDURE — 99214 OFFICE O/P EST MOD 30 MIN: CPT | Performed by: NURSE PRACTITIONER

## 2023-12-21 PROCEDURE — 3044F HG A1C LEVEL LT 7.0%: CPT | Performed by: NURSE PRACTITIONER

## 2023-12-21 PROCEDURE — 3017F COLORECTAL CA SCREEN DOC REV: CPT | Performed by: NURSE PRACTITIONER

## 2023-12-21 NOTE — PROGRESS NOTES
2023    TELEHEALTH EVALUATION -- Audio/Visual (During [facility] public health emergency)    HPI:    Teodoro White (:  1960) has requested an audio/video evaluation for the following concern(s):    HPI    Patient is here for follow up on diabetes. Taking medication as prescribed - Jardiance 10 mg, Trulicity 3 mg once a week, Metformin 2,000 mg. Denies any hypoglycemia episodes. Denies any increased urination, hunger, or thirst.  Trying to eat a healthy, diabetic diet. She does feel like she is on a lot of medications and would be okay with adjusting medications so she is not taking so many pills. She is okay with increasing her Trulicity. She denies any side effects from her Trulicity and states that she is continuing to have weight loss. She admits that she is not having any nausea, vomiting, diarrhea. She is not having abdominal pain. She is taking losartan 25 mg daily for her blood pressure but also for kidney protection and having diabetes. She denies any dizziness. Patient is here for hyperlipidemia follow up. Taking medication as prescribed- Atorvastatin 40 mg. Denies any side effects to the medication. Trying to adhere to a low cholesterol diet. Denies any generalized myalgias. Patient is here to follow up on vitamin D, W59 and folic acid deficiency. Patient is taking vitamin D, L93 and folic supplement without any adverse effects as prescribed. Review of Systems   Constitutional: Negative. Negative for activity change, appetite change, chills, diaphoresis, fatigue, fever and unexpected weight change. HENT: Negative. Negative for ear pain, rhinorrhea, sinus pressure, sneezing, sore throat and trouble swallowing. Eyes:  Negative for photophobia, pain, discharge, redness, itching and visual disturbance. Respiratory: Negative. Negative for apnea, cough, choking, chest tightness, shortness of breath, wheezing and stridor.     Cardiovascular:  Negative for

## 2024-01-21 DIAGNOSIS — E11.65 TYPE 2 DIABETES MELLITUS WITH HYPERGLYCEMIA, WITHOUT LONG-TERM CURRENT USE OF INSULIN (HCC): ICD-10-CM

## 2024-01-22 NOTE — TELEPHONE ENCOUNTER
Refill Request     CONFIRM preferrred pharmacy with the patient.    If Mail Order Rx - Pend for 90 day refill.      Last Seen: Last Seen Department: 12/21/2023  Last Seen by PCP: 12/21/2023    Last Written: 9/7/23    If no future appointment scheduled, route STAFF MESSAGE with patient name to the  Pool for scheduling.      Next Appointment:   Future Appointments   Date Time Provider Department Center   4/16/2024  9:00 AM Maggy Gamez APRN - CNP [place]  [place] - [place]       Message sent to  to schedule appt with patient?  NO      Requested Prescriptions     Pending Prescriptions Disp Refills    Continuous Blood Gluc Sensor (FREESTYLE AFSHIN 2 SENSOR) MISC [Pharmacy Med Name: FREESTYLE AFSHIN 2 SENSOR]       Sig: USE AS DIRECTED AND REPLACE EVERY 14 DAYS

## 2024-02-10 DIAGNOSIS — I10 ESSENTIAL HYPERTENSION: ICD-10-CM

## 2024-02-12 RX ORDER — LOSARTAN POTASSIUM 25 MG/1
TABLET ORAL
Qty: 90 TABLET | Refills: 2 | Status: SHIPPED | OUTPATIENT
Start: 2024-02-12

## 2024-02-12 NOTE — TELEPHONE ENCOUNTER
Refill Request     CONFIRM preferrred pharmacy with the patient.    If Mail Order Rx - Pend for 90 day refill.      Last Seen: Last Seen Department: 12/21/2023  Last Seen by PCP: 12/21/2023    Last Written: 3/14/2023    If no future appointment scheduled, route STAFF MESSAGE with patient name to the  Pool for scheduling.      Next Appointment:   Future Appointments   Date Time Provider Department Center   4/16/2024  9:00 AM Maggy Gamez APRN - CNP [place]  [place] - [place]       Message sent to  to schedule appt with patient?  NO      Requested Prescriptions     Pending Prescriptions Disp Refills    losartan (COZAAR) 25 MG tablet [Pharmacy Med Name: LOSARTAN POTASSIUM 25 MG TAB] 90 tablet 2     Sig: TAKE ONE TABLET BY MOUTH DAILY

## 2024-02-13 DIAGNOSIS — E11.65 TYPE 2 DIABETES MELLITUS WITH HYPERGLYCEMIA, WITHOUT LONG-TERM CURRENT USE OF INSULIN (HCC): ICD-10-CM

## 2024-02-13 DIAGNOSIS — R80.1 PERSISTENT PROTEINURIA: ICD-10-CM

## 2024-02-13 NOTE — TELEPHONE ENCOUNTER
Refill Request     CONFIRM preferrred pharmacy with the patient.    If Mail Order Rx - Pend for 90 day refill.      Last Seen: Last Seen Department: 12/21/2023  Last Seen by PCP: 12/21/2023    Last Written: 10.18.23    If no future appointment scheduled, route STAFF MESSAGE with patient name to the  Pool for scheduling.      Next Appointment:   Future Appointments   Date Time Provider Department Center   4/16/2024  9:00 AM Maggy Gamez, JAYLEN - CNP [place]  [place] - [place]       Message sent to  to schedule appt with patient?  NO    Patient is requesting 90 day supply of both scripts.    empagliflozin (JARDIANCE) 10 MG tablet [[id number]]     Dulaglutide 4.5 MG/0.5ML SOPN [[id number]]

## 2024-04-16 ENCOUNTER — OFFICE VISIT (OUTPATIENT)
Dept: FAMILY MEDICINE CLINIC | Age: 64
End: 2024-04-16
Payer: MEDICAID

## 2024-04-16 VITALS
OXYGEN SATURATION: 97 % | BODY MASS INDEX: 44.72 KG/M2 | DIASTOLIC BLOOD PRESSURE: 72 MMHG | HEIGHT: 62 IN | SYSTOLIC BLOOD PRESSURE: 124 MMHG | WEIGHT: 243 LBS | HEART RATE: 89 BPM

## 2024-04-16 DIAGNOSIS — E53.8 B12 DEFICIENCY: ICD-10-CM

## 2024-04-16 DIAGNOSIS — M54.50 CHRONIC BILATERAL LOW BACK PAIN WITHOUT SCIATICA: ICD-10-CM

## 2024-04-16 DIAGNOSIS — G89.29 CHRONIC BILATERAL LOW BACK PAIN WITHOUT SCIATICA: ICD-10-CM

## 2024-04-16 DIAGNOSIS — E53.8 FOLIC ACID DEFICIENCY: ICD-10-CM

## 2024-04-16 DIAGNOSIS — E11.65 TYPE 2 DIABETES MELLITUS WITH HYPERGLYCEMIA, WITHOUT LONG-TERM CURRENT USE OF INSULIN (HCC): Primary | ICD-10-CM

## 2024-04-16 DIAGNOSIS — E78.2 MIXED HYPERLIPIDEMIA: ICD-10-CM

## 2024-04-16 DIAGNOSIS — I10 ESSENTIAL HYPERTENSION: ICD-10-CM

## 2024-04-16 DIAGNOSIS — E66.01 CLASS 3 SEVERE OBESITY DUE TO EXCESS CALORIES WITH SERIOUS COMORBIDITY AND BODY MASS INDEX (BMI) OF 40.0 TO 44.9 IN ADULT (HCC): ICD-10-CM

## 2024-04-16 LAB
ALBUMIN SERPL-MCNC: 4.3 G/DL (ref 3.4–5)
ALBUMIN/GLOB SERPL: 1.6 {RATIO} (ref 1.1–2.2)
ALP SERPL-CCNC: 132 U/L (ref 40–129)
ALT SERPL-CCNC: 23 U/L (ref 10–40)
ANION GAP SERPL CALCULATED.3IONS-SCNC: 17 MMOL/L (ref 3–16)
AST SERPL-CCNC: 25 U/L (ref 15–37)
BILIRUB SERPL-MCNC: 0.6 MG/DL (ref 0–1)
BUN SERPL-MCNC: 18 MG/DL (ref 7–20)
CALCIUM SERPL-MCNC: 10.5 MG/DL (ref 8.3–10.6)
CHLORIDE SERPL-SCNC: 102 MMOL/L (ref 99–110)
CO2 SERPL-SCNC: 22 MMOL/L (ref 21–32)
CREAT SERPL-MCNC: 0.9 MG/DL (ref 0.6–1.2)
FOLATE SERPL-MCNC: 11.62 NG/ML (ref 4.78–24.2)
GFR SERPLBLD CREATININE-BSD FMLA CKD-EPI: 71 ML/MIN/{1.73_M2}
GLUCOSE SERPL-MCNC: 114 MG/DL (ref 70–99)
POTASSIUM SERPL-SCNC: 4.8 MMOL/L (ref 3.5–5.1)
PROT SERPL-MCNC: 7 G/DL (ref 6.4–8.2)
SODIUM SERPL-SCNC: 141 MMOL/L (ref 136–145)
VIT B12 SERPL-MCNC: 369 PG/ML (ref 211–911)

## 2024-04-16 PROCEDURE — 3074F SYST BP LT 130 MM HG: CPT | Performed by: NURSE PRACTITIONER

## 2024-04-16 PROCEDURE — 36415 COLL VENOUS BLD VENIPUNCTURE: CPT | Performed by: NURSE PRACTITIONER

## 2024-04-16 PROCEDURE — 3046F HEMOGLOBIN A1C LEVEL >9.0%: CPT | Performed by: NURSE PRACTITIONER

## 2024-04-16 PROCEDURE — 1036F TOBACCO NON-USER: CPT | Performed by: NURSE PRACTITIONER

## 2024-04-16 PROCEDURE — G8427 DOCREV CUR MEDS BY ELIG CLIN: HCPCS | Performed by: NURSE PRACTITIONER

## 2024-04-16 PROCEDURE — 3078F DIAST BP <80 MM HG: CPT | Performed by: NURSE PRACTITIONER

## 2024-04-16 PROCEDURE — 99214 OFFICE O/P EST MOD 30 MIN: CPT | Performed by: NURSE PRACTITIONER

## 2024-04-16 PROCEDURE — 2022F DILAT RTA XM EVC RTNOPTHY: CPT | Performed by: NURSE PRACTITIONER

## 2024-04-16 PROCEDURE — 3017F COLORECTAL CA SCREEN DOC REV: CPT | Performed by: NURSE PRACTITIONER

## 2024-04-16 PROCEDURE — G8417 CALC BMI ABV UP PARAM F/U: HCPCS | Performed by: NURSE PRACTITIONER

## 2024-04-16 SDOH — ECONOMIC STABILITY: FOOD INSECURITY: WITHIN THE PAST 12 MONTHS, THE FOOD YOU BOUGHT JUST DIDN'T LAST AND YOU DIDN'T HAVE MONEY TO GET MORE.: NEVER TRUE

## 2024-04-16 SDOH — ECONOMIC STABILITY: FOOD INSECURITY: WITHIN THE PAST 12 MONTHS, YOU WORRIED THAT YOUR FOOD WOULD RUN OUT BEFORE YOU GOT MONEY TO BUY MORE.: NEVER TRUE

## 2024-04-16 SDOH — ECONOMIC STABILITY: HOUSING INSECURITY
IN THE LAST 12 MONTHS, WAS THERE A TIME WHEN YOU DID NOT HAVE A STEADY PLACE TO SLEEP OR SLEPT IN A SHELTER (INCLUDING NOW)?: NO

## 2024-04-16 SDOH — ECONOMIC STABILITY: INCOME INSECURITY: HOW HARD IS IT FOR YOU TO PAY FOR THE VERY BASICS LIKE FOOD, HOUSING, MEDICAL CARE, AND HEATING?: NOT HARD AT ALL

## 2024-04-16 ASSESSMENT — ENCOUNTER SYMPTOMS
SORE THROAT: 0
PHOTOPHOBIA: 0
BACK PAIN: 1
NAUSEA: 0
WHEEZING: 0
RHINORRHEA: 0
CHEST TIGHTNESS: 0
ABDOMINAL PAIN: 0
TROUBLE SWALLOWING: 0
COLOR CHANGE: 0
EYE DISCHARGE: 0
EYE ITCHING: 0
EYE PAIN: 0
SHORTNESS OF BREATH: 0
CHOKING: 0
ALLERGIC/IMMUNOLOGIC NEGATIVE: 1
SINUS PRESSURE: 0
CONSTIPATION: 0
VOMITING: 0
COUGH: 0
EYE REDNESS: 0
APNEA: 0
STRIDOR: 0
BLOOD IN STOOL: 0
RESPIRATORY NEGATIVE: 1
DIARRHEA: 0
GASTROINTESTINAL NEGATIVE: 1

## 2024-04-16 ASSESSMENT — PATIENT HEALTH QUESTIONNAIRE - PHQ9
SUM OF ALL RESPONSES TO PHQ QUESTIONS 1-9: 0
1. LITTLE INTEREST OR PLEASURE IN DOING THINGS: NOT AT ALL
SUM OF ALL RESPONSES TO PHQ QUESTIONS 1-9: 0
SUM OF ALL RESPONSES TO PHQ9 QUESTIONS 1 & 2: 0
SUM OF ALL RESPONSES TO PHQ QUESTIONS 1-9: 0
SUM OF ALL RESPONSES TO PHQ QUESTIONS 1-9: 0
2. FEELING DOWN, DEPRESSED OR HOPELESS: NOT AT ALL

## 2024-04-16 NOTE — PROGRESS NOTES
[facility]  [facility]  [address] [place] [id number]  Dept: [phone]  Dept Fax: [phone]  Loc: [phone]    Morenita Herrera is a 63 y.o. female who presents today for her medical conditions/complaints as noted below.  Morenita Herrera is c/o of Hyperlipidemia (Pt is fasting. Pt takes medication every day. ), Hypertension (Pt occasionally checks bp every few weeks and takes medication every day.), and Diabetes (Pt does check sugar at home and runs around 110-115)        HPI:     Chief Complaint   Patient presents with    Hyperlipidemia     Pt is fasting. Pt takes medication every day.     Hypertension     Pt occasionally checks bp every few weeks and takes medication every day.    Diabetes     Pt does check sugar at home and runs around 110-115       HPI    Patient is here today to follow up on hypertension.  Taking medications as prescribed - Losartan 25 mg daily. Is trying to adhere to a no salt diet.  Denies any chest pain, leg swelling, orthopnea, dizziness.       Patient is here for follow up on diabetes.  She has been taking Jardiance 10 mg and Trulicity 4.5 mg once a week.  She has not noticed any increase in urination or thirst. She admits to having more hunger and cravings.  She states she felt better on Glipizide than on the Jardiance. Her last eye exam was at [facility] - She is up-to-date on her eye exam and her last eye was 6 months ago.   Her last A1C was 6.5 in 12/2023. She denies any sores on her feet.  She denies any numbness or tingling in her feet.      Patient is here for hyperlipidemia follow up.  Taking medication as prescribed - Atorvastatin 40 mg daily.  Denies any side effects to the medication.  Trying to adhere to a low cholesterol diet.  Denies any generalized myalgias.     Morenita admits to having chronic back pain.  She followed up with Dr. Dietz after having her lumbar MRI and patient wanted to consider injections and also follow up with

## 2024-04-16 NOTE — PATIENT INSTRUCTIONS
Ketoconazole shampoo (dandruff) - hair growth     Not feeling your best?  Where to go for the right care at the right time.    Dear Morenita Herrera   I wanted to provide you with some information that might help you seek care for your condition when your primary care provider or specialist is unavailable. If you have a need outside of normal business hours, you should first contact your primary care office or specialist caring for your condition. They may have on-call providers that could assist with your care. During office hours, you may request a virtual or same day appointment.   But what if your primary care provider is not in the office that day and you can't wait until the  next day for care? In that situation, your next option is to visit an urgent care facility.          [facility] now has urgent care sites open to support our community.   [facility] is a great alternative when you need immediate medical care that is not a serious threat to your health or your doctor's office is closed or unable to get you in for an appointment. The urgent care centers offer fast access to [facility] doctors for minor illnesses and injuries for patients of all ages. There are other medical services available including lab testing, X-rays, EKGs, and IV fluids.  Locations are open daily from 8 a.m. - 8 p.m.     [facility]  [address], [place], [place] [address]  [phone]    [address], [place], [place] [address]  [phone]    Local Urgent Care     [facility] 8:30 am - 7:70 pm   [address] [place] [address]  [phone]    [facility]     8 am - 8 pm   [address], [place] [address]  [phone]    [facility] [place] 7 am - 7:30 pm  [address] [place] [address]  [phone]     [facility] [place] 7 am - 7:30 pm  [address]

## 2024-04-17 DIAGNOSIS — R74.8 ELEVATED ALKALINE PHOSPHATASE LEVEL: Primary | ICD-10-CM

## 2024-04-17 DIAGNOSIS — R74.8 ELEVATED ALKALINE PHOSPHATASE LEVEL: ICD-10-CM

## 2024-04-17 DIAGNOSIS — R74.8 ELEVATED SERUM GGT LEVEL: ICD-10-CM

## 2024-04-17 LAB
EST. AVERAGE GLUCOSE BLD GHB EST-MCNC: 137 MG/DL
GGT SERPL-CCNC: 138 U/L (ref 5–36)
HBA1C MFR BLD: 6.4 %

## 2024-05-06 DIAGNOSIS — E11.65 TYPE 2 DIABETES MELLITUS WITH HYPERGLYCEMIA, WITHOUT LONG-TERM CURRENT USE OF INSULIN (HCC): Primary | ICD-10-CM

## 2024-05-06 RX ORDER — SEMAGLUTIDE 2.68 MG/ML
2 INJECTION, SOLUTION SUBCUTANEOUS
Qty: 3 ML | Refills: 1 | Status: SHIPPED | OUTPATIENT
Start: 2024-05-06

## 2024-05-06 NOTE — TELEPHONE ENCOUNTER
Patient called and  spoke to RN, requested Rx for Trulicity.  RN call [facility] and spoke to Ellie.  Rx is there but [facility] is unable to fill Trulicity 4.5 mg  RN called patient to see if she would call local pharmacy of her choice to see if available.  She called and it is not available, She is requesting Ozempic to be sent to [facility].  RN confirmed [facility] has Ozempic 2 mg in stock.

## 2024-05-07 ENCOUNTER — TELEPHONE (OUTPATIENT)
Dept: ADMINISTRATIVE | Age: 64
End: 2024-05-07

## 2024-05-07 NOTE — TELEPHONE ENCOUNTER
Submitted PA for Ozempic (2 MG/DOSE) 8MG/3ML pen-injectors  Via CMM (Key: [id number]) STATUS: PENDING.    Follow up done daily; if no decision with in three days we will refax.  If another three days goes by with no decision will call the insurance for status.

## 2024-05-08 NOTE — TELEPHONE ENCOUNTER
The medication is APPROVED THRU 05/06/2025    If this requires a response please respond to the pool ( P [facility]X [facility] MEDICATION PRE-AUTH).      Thank you please advise patient.

## 2024-06-21 DIAGNOSIS — E11.65 TYPE 2 DIABETES MELLITUS WITH HYPERGLYCEMIA, WITHOUT LONG-TERM CURRENT USE OF INSULIN (HCC): ICD-10-CM

## 2024-06-21 RX ORDER — SEMAGLUTIDE 2.68 MG/ML
INJECTION, SOLUTION SUBCUTANEOUS
Qty: 3 ML | Refills: 1 | Status: SHIPPED | OUTPATIENT
Start: 2024-06-21

## 2024-06-21 NOTE — TELEPHONE ENCOUNTER
Refill Request     CONFIRM preferrred pharmacy with the patient.    If Mail Order Rx - Pend for 90 day refill.      Last Seen: Last Seen Department: 4/16/2024  Last Seen by PCP: 4/16/2024    Last Written: 5/6/24    If no future appointment scheduled, route STAFF MESSAGE with patient name to the  Pool for scheduling.      Next Appointment:   No future appointments. Pt had appt in April 2024    Message sent to  to schedule appt with patient?  NO      Requested Prescriptions     Pending Prescriptions Disp Refills    OZEMPIC, 2 MG/DOSE, 8 MG/3ML SOPN sc injection [Pharmacy Med Name: OZEMPIC 2 MG/DOSE (8 MG/3 ML)] 3 mL 1     Sig: DIAL AND INJECT UNDER THE SKIN 2 MG WEEKLY

## 2024-06-22 DIAGNOSIS — E11.65 TYPE 2 DIABETES MELLITUS WITH HYPERGLYCEMIA, WITHOUT LONG-TERM CURRENT USE OF INSULIN (HCC): ICD-10-CM

## 2024-06-24 NOTE — TELEPHONE ENCOUNTER
Refill Request     CONFIRM preferrred pharmacy with the patient.    If Mail Order Rx - Pend for 90 day refill.      Last Seen: Last Seen Department: 4/16/2024  Last Seen by PCP: 4/16/2024    Last Written: 9/20/23    If no future appointment scheduled, route STAFF MESSAGE with patient name to the  Pool for scheduling.      Next Appointment:   No future appointments.    Message sent to  to schedule appt with patient?  N/A      Requested Prescriptions     Pending Prescriptions Disp Refills    metFORMIN (GLUCOPHAGE) 500 MG tablet [Pharmacy Med Name: METFORMIN  MG TABLET] 360 tablet 1     Sig: TAKE 2 TABLETS BY MOUTH TWICE A DAY

## 2024-06-25 NOTE — TELEPHONE ENCOUNTER
Spoke with patient she said she is no longer taking that and requested that refill by mistake. She will call her pharmacy and have that cancelled.

## 2024-07-02 ENCOUNTER — OFFICE VISIT (OUTPATIENT)
Dept: FAMILY MEDICINE CLINIC | Age: 64
End: 2024-07-02
Payer: MEDICAID

## 2024-07-02 VITALS
DIASTOLIC BLOOD PRESSURE: 68 MMHG | SYSTOLIC BLOOD PRESSURE: 110 MMHG | HEART RATE: 96 BPM | HEIGHT: 62 IN | WEIGHT: 238 LBS | BODY MASS INDEX: 43.79 KG/M2 | OXYGEN SATURATION: 98 %

## 2024-07-02 DIAGNOSIS — I10 ESSENTIAL HYPERTENSION: ICD-10-CM

## 2024-07-02 DIAGNOSIS — M54.50 CHRONIC BILATERAL LOW BACK PAIN WITHOUT SCIATICA: ICD-10-CM

## 2024-07-02 DIAGNOSIS — E78.2 MIXED HYPERLIPIDEMIA: ICD-10-CM

## 2024-07-02 DIAGNOSIS — E55.9 VITAMIN D DEFICIENCY: ICD-10-CM

## 2024-07-02 DIAGNOSIS — E66.01 CLASS 3 SEVERE OBESITY DUE TO EXCESS CALORIES WITH SERIOUS COMORBIDITY AND BODY MASS INDEX (BMI) OF 40.0 TO 44.9 IN ADULT (HCC): ICD-10-CM

## 2024-07-02 DIAGNOSIS — E11.65 TYPE 2 DIABETES MELLITUS WITH HYPERGLYCEMIA, WITHOUT LONG-TERM CURRENT USE OF INSULIN (HCC): Primary | ICD-10-CM

## 2024-07-02 DIAGNOSIS — R80.1 PERSISTENT PROTEINURIA: ICD-10-CM

## 2024-07-02 DIAGNOSIS — G89.29 CHRONIC BILATERAL LOW BACK PAIN WITHOUT SCIATICA: ICD-10-CM

## 2024-07-02 PROCEDURE — 1036F TOBACCO NON-USER: CPT | Performed by: NURSE PRACTITIONER

## 2024-07-02 PROCEDURE — 3074F SYST BP LT 130 MM HG: CPT | Performed by: NURSE PRACTITIONER

## 2024-07-02 PROCEDURE — 3078F DIAST BP <80 MM HG: CPT | Performed by: NURSE PRACTITIONER

## 2024-07-02 PROCEDURE — G8417 CALC BMI ABV UP PARAM F/U: HCPCS | Performed by: NURSE PRACTITIONER

## 2024-07-02 PROCEDURE — 3044F HG A1C LEVEL LT 7.0%: CPT | Performed by: NURSE PRACTITIONER

## 2024-07-02 PROCEDURE — G8427 DOCREV CUR MEDS BY ELIG CLIN: HCPCS | Performed by: NURSE PRACTITIONER

## 2024-07-02 PROCEDURE — 99214 OFFICE O/P EST MOD 30 MIN: CPT | Performed by: NURSE PRACTITIONER

## 2024-07-02 PROCEDURE — 2022F DILAT RTA XM EVC RTNOPTHY: CPT | Performed by: NURSE PRACTITIONER

## 2024-07-02 PROCEDURE — 3017F COLORECTAL CA SCREEN DOC REV: CPT | Performed by: NURSE PRACTITIONER

## 2024-07-02 ASSESSMENT — ENCOUNTER SYMPTOMS
PHOTOPHOBIA: 0
BACK PAIN: 1
EYE REDNESS: 0
VOMITING: 0
EYE ITCHING: 0
ABDOMINAL PAIN: 0
NAUSEA: 0
EYE DISCHARGE: 0
CONSTIPATION: 0
DIARRHEA: 0
SINUS PRESSURE: 0
BLOOD IN STOOL: 0
GASTROINTESTINAL NEGATIVE: 1
TROUBLE SWALLOWING: 0
RESPIRATORY NEGATIVE: 1
SHORTNESS OF BREATH: 0
STRIDOR: 0
WHEEZING: 0
CHOKING: 0
RHINORRHEA: 0
ALLERGIC/IMMUNOLOGIC NEGATIVE: 1
CHEST TIGHTNESS: 0
COUGH: 0
SORE THROAT: 0
EYE PAIN: 0
COLOR CHANGE: 0
APNEA: 0

## 2024-07-02 NOTE — PROGRESS NOTES
monitoring kit (FREESTYLE) monitoring kit 1 kit by Does not apply route daily 1 kit 0    fluticasone (FLONASE) 50 MCG/ACT nasal spray 1 spray by Nasal route daily 1 Bottle 3    cetirizine (ZYRTEC ALLERGY) 10 MG tablet Take 1 tablet by mouth daily 90 tablet 3    loratadine (CLARITIN) 10 MG tablet Take 1 tablet by mouth daily 90 tablet 3    aspirin 81 MG tablet Take 1 tablet by mouth daily       No current facility-administered medications for this visit.     No Known Allergies    :      Review of Systems   Constitutional:  Positive for fatigue. Negative for activity change, appetite change, chills, diaphoresis, fever and unexpected weight change.   HENT: Negative.  Negative for ear pain, rhinorrhea, sinus pressure, sneezing, sore throat and trouble swallowing.    Eyes:  Negative for photophobia, pain, discharge, redness, itching and visual disturbance.   Respiratory: Negative.  Negative for apnea, cough, choking, chest tightness, shortness of breath, wheezing and stridor.    Cardiovascular: Negative.  Negative for chest pain, palpitations and leg swelling.   Gastrointestinal: Negative.  Negative for abdominal pain, blood in stool, constipation, diarrhea, nausea and vomiting.   Genitourinary: Negative.  Negative for decreased urine volume, difficulty urinating, dysuria, enuresis, flank pain, frequency, genital sores, hematuria and urgency.   Musculoskeletal:  Positive for back pain. Negative for arthralgias, gait problem, joint swelling, myalgias, neck pain and neck stiffness.   Skin: Negative.  Negative for color change, pallor, rash and wound.   Allergic/Immunologic: Negative.    Neurological:  Negative for dizziness, tremors, seizures, syncope, facial asymmetry, speech difficulty, weakness, light-headedness, numbness and headaches.   Psychiatric/Behavioral:  Negative for agitation, behavioral problems, confusion, decreased concentration, dysphoric mood, hallucinations, self-injury, sleep disturbance and suicidal

## 2024-07-03 ENCOUNTER — TELEPHONE (OUTPATIENT)
Dept: FAMILY MEDICINE CLINIC | Age: 64
End: 2024-07-03

## 2024-07-03 ENCOUNTER — HOSPITAL ENCOUNTER (OUTPATIENT)
Dept: ULTRASOUND IMAGING | Age: 64
Discharge: HOME | End: 2024-07-03
Payer: MEDICAID

## 2024-07-03 ENCOUNTER — HOSPITAL ENCOUNTER (OUTPATIENT)
Age: 64
Discharge: HOME | End: 2024-07-03
Payer: MEDICAID

## 2024-07-03 DIAGNOSIS — Z78.0 POST-MENOPAUSAL: ICD-10-CM

## 2024-07-03 DIAGNOSIS — Z91.09 ENVIRONMENTAL ALLERGIES: ICD-10-CM

## 2024-07-03 DIAGNOSIS — I10 ESSENTIAL HYPERTENSION: ICD-10-CM

## 2024-07-03 DIAGNOSIS — E55.9 VITAMIN D DEFICIENCY: ICD-10-CM

## 2024-07-03 DIAGNOSIS — R74.8 ELEVATED ALKALINE PHOSPHATASE LEVEL: ICD-10-CM

## 2024-07-03 DIAGNOSIS — R74.8 ELEVATED SERUM GGT LEVEL: ICD-10-CM

## 2024-07-03 DIAGNOSIS — Z13.820 SCREENING FOR OSTEOPOROSIS: Primary | ICD-10-CM

## 2024-07-03 DIAGNOSIS — E78.2 MIXED HYPERLIPIDEMIA: ICD-10-CM

## 2024-07-03 DIAGNOSIS — E11.65 TYPE 2 DIABETES MELLITUS WITH HYPERGLYCEMIA, WITHOUT LONG-TERM CURRENT USE OF INSULIN (HCC): ICD-10-CM

## 2024-07-03 LAB
25(OH)D3 SERPL-MCNC: 50.5 NG/ML
ALBUMIN SERPL-MCNC: 4.3 G/DL (ref 3.4–5)
ALBUMIN/GLOB SERPL: 1.3 {RATIO} (ref 1.1–2.2)
ALP SERPL-CCNC: 135 U/L (ref 40–129)
ALT SERPL-CCNC: 15 U/L (ref 10–40)
ANION GAP SERPL CALCULATED.3IONS-SCNC: 15 MMOL/L (ref 3–16)
AST SERPL-CCNC: 20 U/L (ref 15–37)
BASOPHILS # BLD: 0.1 K/UL (ref 0–0.2)
BASOPHILS NFR BLD: 0.9 %
BILIRUB SERPL-MCNC: 1 MG/DL (ref 0–1)
BUN SERPL-MCNC: 15 MG/DL (ref 7–20)
CALCIUM SERPL-MCNC: 10 MG/DL (ref 8.3–10.6)
CHLORIDE SERPL-SCNC: 107 MMOL/L (ref 99–110)
CHOLEST SERPL-MCNC: 211 MG/DL (ref 0–199)
CO2 SERPL-SCNC: 22 MMOL/L (ref 21–32)
CREAT SERPL-MCNC: 0.9 MG/DL (ref 0.6–1.2)
CREAT UR-MCNC: 108 MG/DL (ref 28–259)
DEPRECATED RDW RBC AUTO: 15.8 % (ref 12.4–15.4)
EOSINOPHIL # BLD: 0.2 K/UL (ref 0–0.6)
EOSINOPHIL NFR BLD: 2.2 %
FOLATE SERPL-MCNC: 9.22 NG/ML (ref 4.78–24.2)
GFR SERPLBLD CREATININE-BSD FMLA CKD-EPI: 71 ML/MIN/{1.73_M2}
GLUCOSE SERPL-MCNC: 118 MG/DL (ref 70–99)
HCT VFR BLD AUTO: 43.4 % (ref 36–48)
HDLC SERPL-MCNC: 56 MG/DL (ref 40–60)
HGB BLD-MCNC: 14.5 G/DL (ref 12–16)
LDLC SERPL CALC-MCNC: 127 MG/DL
LYMPHOCYTES # BLD: 2.8 K/UL (ref 1–5.1)
LYMPHOCYTES NFR BLD: 31.1 %
MAGNESIUM SERPL-MCNC: 2.3 MG/DL (ref 1.8–2.4)
MCH RBC QN AUTO: 27.8 PG (ref 26–34)
MCHC RBC AUTO-ENTMCNC: 33.3 G/DL (ref 31–36)
MCV RBC AUTO: 83.5 FL (ref 80–100)
MICROALBUMIN UR DL<=1MG/L-MCNC: <1.2 MG/DL
MICROALBUMIN/CREAT UR: NORMAL MG/G (ref 0–30)
MONOCYTES # BLD: 0.8 K/UL (ref 0–1.3)
MONOCYTES NFR BLD: 9 %
NEUTROPHILS # BLD: 5.2 K/UL (ref 1.7–7.7)
NEUTROPHILS NFR BLD: 56.8 %
PLATELET # BLD AUTO: 392 K/UL (ref 135–450)
PMV BLD AUTO: 8.2 FL (ref 5–10.5)
POTASSIUM SERPL-SCNC: 4.2 MMOL/L (ref 3.5–5.1)
PROT SERPL-MCNC: 7.6 G/DL (ref 6.4–8.2)
RBC # BLD AUTO: 5.2 M/UL (ref 4–5.2)
SODIUM SERPL-SCNC: 144 MMOL/L (ref 136–145)
TRIGL SERPL-MCNC: 142 MG/DL (ref 0–150)
TSH SERPL DL<=0.005 MIU/L-ACNC: 1.24 UIU/ML (ref 0.27–4.2)
VIT B12 SERPL-MCNC: 326 PG/ML (ref 211–911)
VLDLC SERPL CALC-MCNC: 28 MG/DL
WBC # BLD AUTO: 9.1 K/UL (ref 4–11)

## 2024-07-03 PROCEDURE — 76705 ECHO EXAM OF ABDOMEN: CPT

## 2024-07-03 PROCEDURE — 36415 COLL VENOUS BLD VENIPUNCTURE: CPT

## 2024-07-03 PROCEDURE — 82746 ASSAY OF FOLIC ACID SERUM: CPT

## 2024-07-03 PROCEDURE — 83036 HEMOGLOBIN GLYCOSYLATED A1C: CPT

## 2024-07-03 PROCEDURE — 82570 ASSAY OF URINE CREATININE: CPT

## 2024-07-03 PROCEDURE — 83735 ASSAY OF MAGNESIUM: CPT

## 2024-07-03 PROCEDURE — 85025 COMPLETE CBC W/AUTO DIFF WBC: CPT

## 2024-07-03 PROCEDURE — 82043 UR ALBUMIN QUANTITATIVE: CPT

## 2024-07-03 PROCEDURE — 82306 VITAMIN D 25 HYDROXY: CPT

## 2024-07-03 PROCEDURE — 80061 LIPID PANEL: CPT

## 2024-07-03 PROCEDURE — 80053 COMPREHEN METABOLIC PANEL: CPT

## 2024-07-03 PROCEDURE — 84443 ASSAY THYROID STIM HORMONE: CPT

## 2024-07-03 PROCEDURE — 82607 VITAMIN B-12: CPT

## 2024-07-03 RX ORDER — LORATADINE 10 MG/1
10 TABLET ORAL DAILY
Qty: 90 TABLET | Refills: 3 | Status: SHIPPED | OUTPATIENT
Start: 2024-07-03

## 2024-07-03 RX ORDER — FLUTICASONE PROPIONATE 50 MCG
1 SPRAY, SUSPENSION (ML) NASAL DAILY
Qty: 1 EACH | Refills: 3 | Status: SHIPPED | OUTPATIENT
Start: 2024-07-03

## 2024-07-03 RX ORDER — CETIRIZINE HYDROCHLORIDE 10 MG/1
10 TABLET ORAL DAILY
Qty: 90 TABLET | Refills: 3 | Status: SHIPPED | OUTPATIENT
Start: 2024-07-03

## 2024-07-04 LAB
EST. AVERAGE GLUCOSE BLD GHB EST-MCNC: 137 MG/DL
HBA1C MFR BLD: 6.4 %

## 2024-07-05 ENCOUNTER — TELEPHONE (OUTPATIENT)
Dept: FAMILY MEDICINE CLINIC | Age: 64
End: 2024-07-05

## 2024-07-05 DIAGNOSIS — E11.65 TYPE 2 DIABETES MELLITUS WITH HYPERGLYCEMIA, WITHOUT LONG-TERM CURRENT USE OF INSULIN (HCC): ICD-10-CM

## 2024-07-05 NOTE — TELEPHONE ENCOUNTER
RN called and spoke to patient, Morenita.  Please verify with patient - is she taking her Pravastatin?  Patient states she has been off for a month or so but [facility]  , she said they have a refill ready for.  LDL has increased higher than it has been in 3 years.   Alkaline phosphatase slightly elevated still - recommend going ahead and getting DEXA scan as mentioned in US result.    Dexa Scan ordered and scheduled. July 15th per patient.  Protein has improved dramatically in the urine with her being on jardiance. Continue on Jardiance.   Vitamin D WNL. Magnesium WNL. B12 and folate stable.  A1C maintaining in pre-diabetes range. Doing great with current treatment and with her lifestyle changes.   Patient verbalized good understanding of instructions.     Future Appointments   Date Time Provider Department Center   7/15/2024  8:00 AM [facility] [place] [facility]

## 2024-07-05 NOTE — TELEPHONE ENCOUNTER
Kathe @ [facility] pharmacy called waiting to know which allergy pill pt is on.  She had a RX sent in for Claritin and zyrtec?  Please advise.

## 2024-07-08 NOTE — TELEPHONE ENCOUNTER
SW made contact with pt and introduced self.  SW explained reason for call and asked pt to write down name and number to contact with future use so can receive assistance with needed medication.  Pt took down SW contact information.  PLAN: Pt will contact NAYLA after 8/1/24 to receive assistance with medication.

## 2024-07-09 ENCOUNTER — COMMUNITY OUTREACH (OUTPATIENT)
Dept: FAMILY MEDICINE CLINIC | Age: 64
End: 2024-07-09

## 2024-07-09 NOTE — PROGRESS NOTES
Patient's HM shows they are overdue for Mammogram Screening.  Care Everywhere and  files searched.  No results to attach to order nor HM updated.     [

## 2024-07-12 ENCOUNTER — TELEPHONE (OUTPATIENT)
Dept: OTHER | Age: 64
End: 2024-07-12

## 2024-07-12 NOTE — TELEPHONE ENCOUNTER
ELOISA-SW spoke with patient on this date as referred by PCP. Patient was /at the fair so unable to talk. Patient requested that SW return call on 7/15 at 10AM.

## 2024-07-15 ENCOUNTER — HOSPITAL ENCOUNTER (OUTPATIENT)
Dept: GENERAL RADIOLOGY | Age: 64
Discharge: HOME | End: 2024-07-15
Payer: MEDICAID

## 2024-07-15 ENCOUNTER — COMMUNITY OUTREACH (OUTPATIENT)
Dept: OTHER | Age: 64
End: 2024-07-15

## 2024-07-15 DIAGNOSIS — R74.8 ELEVATED ALKALINE PHOSPHATASE LEVEL: ICD-10-CM

## 2024-07-15 DIAGNOSIS — Z13.820 SCREENING FOR OSTEOPOROSIS: ICD-10-CM

## 2024-07-15 DIAGNOSIS — Z78.0 POST-MENOPAUSAL: ICD-10-CM

## 2024-07-15 PROCEDURE — 77080 DXA BONE DENSITY AXIAL: CPT

## 2024-08-07 DIAGNOSIS — E11.65 TYPE 2 DIABETES MELLITUS WITH HYPERGLYCEMIA, WITHOUT LONG-TERM CURRENT USE OF INSULIN (HCC): ICD-10-CM

## 2024-08-07 NOTE — TELEPHONE ENCOUNTER
Patient called stating she got new insurance and her freestyle sensor is not covered at [facility] any more. She would like it sent to [facility]. Rx and pharm pended

## 2024-08-08 NOTE — TELEPHONE ENCOUNTER
Submitted PA for FreeStyle [place] 2 Sensor   Via CMM Key: [id number]  STATUS: Approval    PA Case: [id number], Status: Approved, Coverage Starts on: 8/1/2024 12:00:00 AM, Coverage Ends on: 8/8/2025 12:00:00 AM.     If this requires a response please respond to the pool. (P [facility] MEDICINE Pre-Auth).    Please advise patient thank you.

## 2024-08-09 NOTE — TELEPHONE ENCOUNTER
The medication is APPROVED. LETTER AVAILABLE IN MEDIA  APPROVED 08/01/2024- 08/08/2025    If this requires a response please respond to the pool ( P MHCX [facility] MEDICATION PRE-AUTH).      Thank you please advise patient.

## 2024-08-13 NOTE — PROGRESS NOTES
ELOISA-NAYLA spoke with patient on this date along with daughter. SW and patient discussed eligibility for Medicare coming up and what initial enrollment period is. SW reviewed how Medicare works and what the different options are. SW referred [facility] to [facility] to review available plans to pick the best for her needs. Patient voiced understanding.

## 2024-08-26 ENCOUNTER — TELEPHONE (OUTPATIENT)
Dept: FAMILY MEDICINE CLINIC | Age: 64
End: 2024-08-26

## 2024-08-26 DIAGNOSIS — E11.65 TYPE 2 DIABETES MELLITUS WITH HYPERGLYCEMIA, WITHOUT LONG-TERM CURRENT USE OF INSULIN (HCC): ICD-10-CM

## 2024-08-26 DIAGNOSIS — R80.1 PERSISTENT PROTEINURIA: ICD-10-CM

## 2024-08-26 RX ORDER — SEMAGLUTIDE 2.68 MG/ML
2 INJECTION, SOLUTION SUBCUTANEOUS
Qty: 12 ML | Refills: 2 | Status: SHIPPED | OUTPATIENT
Start: 2024-08-26

## 2024-08-26 NOTE — TELEPHONE ENCOUNTER
RN called and spoke to Morenita Herrera.  Confirmed current dosage of Ozempic / Semaglutide.  Patient states when she changed from Trulicity to Ozempic in May she was started on 2 mg weekly and continues to take 2 mg weekly.  Patient states she will be meeting MSW on Wednesday 8/28/24 at 10 am to have her assist with paperwork to assist with medications.

## 2024-09-10 ENCOUNTER — TELEPHONE (OUTPATIENT)
Dept: FAMILY MEDICINE CLINIC | Age: 64
End: 2024-09-10

## 2024-09-10 DIAGNOSIS — E11.65 TYPE 2 DIABETES MELLITUS WITH HYPERGLYCEMIA, WITHOUT LONG-TERM CURRENT USE OF INSULIN (HCC): Primary | ICD-10-CM

## 2024-09-10 RX ORDER — DAPAGLIFLOZIN 10 MG/1
10 TABLET, FILM COATED ORAL EVERY MORNING
Qty: 90 TABLET | Refills: 0 | Status: SHIPPED | OUTPATIENT
Start: 2024-09-10

## 2024-09-11 ENCOUNTER — TELEPHONE (OUTPATIENT)
Dept: FAMILY MEDICINE CLINIC | Age: 64
End: 2024-09-11

## 2024-09-11 DIAGNOSIS — E11.65 TYPE 2 DIABETES MELLITUS WITH HYPERGLYCEMIA, WITHOUT LONG-TERM CURRENT USE OF INSULIN (HCC): ICD-10-CM

## 2024-09-11 DIAGNOSIS — R80.1 PERSISTENT PROTEINURIA: ICD-10-CM

## 2024-09-11 RX ORDER — SEMAGLUTIDE 2.68 MG/ML
2 INJECTION, SOLUTION SUBCUTANEOUS
Qty: 12 ML | Refills: 2 | Status: SHIPPED | OUTPATIENT
Start: 2024-09-11

## 2024-10-04 ENCOUNTER — TELEPHONE (OUTPATIENT)
Dept: FAMILY MEDICINE CLINIC | Age: 64
End: 2024-10-04

## 2024-10-04 NOTE — TELEPHONE ENCOUNTER
RN called and spoke to patient.  Patient has been waiting for prior auth and clarification on income with [facility] .    She has not picked up the Ozempic because it has not been covered, She is not taking the Farxiga.     She continues on the Jardiance    empagliflozin (JARDIANCE) 10 MG tablet [[id number]]    Order Details  8/26/2024  Dose: 10 mg Route: Oral    Dispense Quantity: 90 tablet Refills: 3

## 2024-10-11 RX ORDER — PHENOL 1.4 %
1 AEROSOL, SPRAY (ML) MUCOUS MEMBRANE DAILY
COMMUNITY

## 2024-11-07 ENCOUNTER — TELEPHONE (OUTPATIENT)
Dept: FAMILY MEDICINE CLINIC | Age: 64
End: 2024-11-07

## 2024-11-07 DIAGNOSIS — R80.1 PERSISTENT PROTEINURIA: ICD-10-CM

## 2024-11-07 DIAGNOSIS — E11.65 TYPE 2 DIABETES MELLITUS WITH HYPERGLYCEMIA, WITHOUT LONG-TERM CURRENT USE OF INSULIN (HCC): ICD-10-CM

## 2024-11-07 RX ORDER — SEMAGLUTIDE 2.68 MG/ML
2 INJECTION, SOLUTION SUBCUTANEOUS
Qty: 12 ML | Refills: 2 | Status: SHIPPED | OUTPATIENT
Start: 2024-11-07

## 2024-11-07 RX ORDER — SEMAGLUTIDE 2.68 MG/ML
2 INJECTION, SOLUTION SUBCUTANEOUS
Qty: 12 ML | Refills: 2 | Status: SHIPPED | OUTPATIENT
Start: 2024-11-07 | End: 2024-11-07

## 2024-11-07 NOTE — TELEPHONE ENCOUNTER
SW spoke with pt about needing assistance with obtaining Ozempic through [employer] and how can meet with pt this afternoon to complete [employer] PAP application for 2025.  PLAN: Sw will meet with pt on 11/7/24 at 2pm to complete [employer] PAP application for Ozempic.

## 2024-11-07 NOTE — TELEPHONE ENCOUNTER
Reason for Referral   Medication Assistance / Medication Packs / Med Sets      Plan:    Pt will work with [employer] and be on the lookout for any communication about her eligibility to receive Ozempic after application was faxed over on PAP program by NAYLA.    SW will contact pt and [employer] PAP's to follow-up on PAP application as needed.        Assessment/Summary:    SW assessed pt's needs and met with pt to complete [employer] PAP application to obtain Ozempic.  Pt stated she is paying for her Jardiance since she was denied eligibility for PAP.  Pt was able to provide information and documents needed for PAP application. Pt signed [employer] application to enable SW to fax PAP application after obtained PCP signature on application.  Pt aware application will be faxed over and may take 5-7 days after receiving application before PAP make a decision on her eligibility for [employer] PAP.   Pt aware to contact SW to inform if she hears anything about her eligibility status.       Interventions:    SW assisted pt in completing [employer] PAP application and gathered needed information from the pt and PCP for the application.    SW worked with PCP to obtained and prescription information for Ozempic to process the [employer] PAP application.    SW informed pt how faxed over completed PAP form to both [employer] PAP programs.      SW informed pt how [employer] will contact her about eligibility to receive medications and to contact SW about her eligibility status so can inform her PCP.     NAYLA had successful transmission forms uploaded into pt's media file in chart.

## 2024-11-13 NOTE — TELEPHONE ENCOUNTER
NAYLA spoke with representative from [employer]  regarding pt's approval for PAP. SW was informed pt's ID with [employer] is [id number].  SW was informed pt was approved for PAP and will receive medication the first part of January/2025.  NAYLA thanked representative for her assistance.  PLAN: NAYLA will inform pt and PCP about pt's approval to obtain Ozempic.

## 2024-11-13 NOTE — TELEPHONE ENCOUNTER
SW spoke with pt and informed she was approved to receive Ozempic from [employer] and will receive it in January/2025.  Sw informed pt to call SW if needs any other additional assistance.  PLAN: NAYLA will inform PCP pt was approved for [employer] PAP to receive Ozempic in January/2025.

## 2024-11-14 DIAGNOSIS — E11.65 TYPE 2 DIABETES MELLITUS WITH HYPERGLYCEMIA, WITHOUT LONG-TERM CURRENT USE OF INSULIN (HCC): ICD-10-CM

## 2024-11-14 DIAGNOSIS — I10 ESSENTIAL HYPERTENSION: ICD-10-CM

## 2024-11-14 DIAGNOSIS — R80.1 PERSISTENT PROTEINURIA: ICD-10-CM

## 2024-11-14 RX ORDER — LOSARTAN POTASSIUM 25 MG/1
TABLET ORAL
Qty: 90 TABLET | Refills: 2 | Status: SHIPPED | OUTPATIENT
Start: 2024-11-14

## 2024-11-14 RX ORDER — EMPAGLIFLOZIN 10 MG/1
10 TABLET, FILM COATED ORAL EVERY MORNING
Qty: 30 TABLET | Refills: 2 | Status: SHIPPED | OUTPATIENT
Start: 2024-11-14

## 2024-12-18 ENCOUNTER — TELEPHONE (OUTPATIENT)
Dept: PHARMACY | Facility: CLINIC | Age: 64
End: 2024-12-18

## 2024-12-18 NOTE — TELEPHONE ENCOUNTER
[facility] CLINICAL [facility]ARMAC[facility]: ADHERENCE REVIEW  Identified care gap per [place]: fills at [facility]: Diabetes adherence    [facility] [id number] - [place], [place] - [address] - P [phone] - F [phone]  [address] [facility] [place] [address]  Phone: [phone] Fax: [phone]        Patient also appears to be prescribed: ACE/ARB, Diabetes, and Statin     Current Outpatient Medications   Medication Instructions    aspirin 81 mg, Oral, DAILY    atorvastatin (LIPITOR) 40 mg, Oral, DAILY    Blood Glucose Monitoring Suppl (ONE TOUCH ULTRA 2) w/Device KIT 1 kit, Does not apply, DAILY, Testing once a day    blood glucose test strips (ONETOUCH ULTRA) strip 1 each, In Vitro, DAILY, Testing once a day.    calcium carbonate 600 MG TABS tablet 1 tablet, Oral, DAILY    Continuous Glucose Sensor (FREESTYLE AFSHIN 2 SENSOR) MISC USE AS DIRECTED AND REPLACE EVERY 14 DAYS    FreeStyle Lancets MISC 1 each, Does not apply, DAILY    Jardiance 10 mg, Oral, EVERY MORNING    losartan (COZAAR) 25 MG tablet TAKE 1 TABLET BY MOUTH DAILY    Ozempic (2 MG/DOSE) 2 mg, SubCUTAneous, EVERY 7 DAYS    vitamin D 25 MCG (1000 UT) CAPS 1 capsule, Oral, DAILY         ASSESSMENT    DIABETES ADHERENCE  Insurance Records claims through 12/9/24  YTD PDC = 87%; Potential Fail Date: 12/26/24:   Jardiance 10 mg last filled for 30 days supply.  Next refill due: 12/14/24  2 refill(s) remaining    Per Reconciled Dispense Report:  JARDIANCE 10MG TAB 10/15/2024 30 30 tablet Maggy Gamez APRN  CNP [facility] [id number]...   JARDIANCE 10MG TAB 09/04/2024 30 30 tablet Maggy Gamez APRN  CNP [facility] [id number]...     Per [place] Portal:  Same as above; 2 refill(s) remaining at [facility].    Per [facility]:   will get  30 day supply ready to  since past due.   Rx# [id number] - will be ready for  12/19/24 afternoon.    Lab Results   Component Value Date    LABA1C 6.4 07/03/2024    LABA1C 6.4 04/16/2024

## 2025-01-09 ENCOUNTER — TELEPHONE (OUTPATIENT)
Dept: FAMILY MEDICINE CLINIC | Age: 65
End: 2025-01-09

## 2025-01-09 NOTE — TELEPHONE ENCOUNTER
RN called and spoke to patient.  She has been in contact with her insurance company any has requested special supplemental benefit.  She states that her insurance company will be sending the PCP office office information for confirmation of eligibility.  She will contact her insurance company to determine how they will be contacting the PCP office.    She has an appointment scheduled with PCP on Tuesday and with fasting labs.  Instructed patient to have nothing to eat or drink after midnight, except for water or black coffee.  RN reviewed all current meds and the med list is correct.  Med list is scanned into Media .    Future Appointments   Date Time Provider Department Center   1/14/2025  8:20 AM Maggy Gamez APRN - CNP [place] [facility] DEP

## 2025-01-09 NOTE — TELEPHONE ENCOUNTER
----- Message from Karen ANGELES sent at 1/8/2025 11:55 AM EST -----  Regarding: ECC Message to Provider  ECC Message to Provider    Relationship to Patient: Covered Entity Medicare Insurance     Additional Information: Internally sending a request tomorrow for prior authorization. She is selected [employer]. It requires prior authorization, patient has diabetes to qualify.  --------------------------------------------------------------------------------------------------------------------------    Call Back Information: OK to leave message on voicemail  Preferred Call Back Number: Phone [phone]

## 2025-01-13 DIAGNOSIS — E78.2 MIXED HYPERLIPIDEMIA: ICD-10-CM

## 2025-01-13 RX ORDER — ATORVASTATIN CALCIUM 40 MG/1
40 TABLET, FILM COATED ORAL DAILY
Qty: 90 TABLET | Refills: 2 | Status: SHIPPED | OUTPATIENT
Start: 2025-01-13

## 2025-01-13 NOTE — TELEPHONE ENCOUNTER
Refill Request     CONFIRM preferrred pharmacy with the patient.    If Mail Order Rx - Pend for 90 day refill.      Last Seen: Last Seen Department: 7/2/2024  Last Seen by PCP: 7/2/2024    Last Written: 10.17.23    If no future appointment scheduled, route STAFF MESSAGE with patient name to the  Pool for scheduling.      Next Appointment:   Future Appointments   Date Time Provider Department Center   1/14/2025  8:20 AM [facility]       Message sent to  to schedule appt with patient?  N/A      Requested Prescriptions     Pending Prescriptions Disp Refills    atorvastatin (LIPITOR) 40 MG tablet [Pharmacy Med Name: ATORVASTATIN 40 MG TABLET] 90 tablet 2     Sig: TAKE 1 TABLET BY MOUTH DAILY

## 2025-01-14 ENCOUNTER — OFFICE VISIT (OUTPATIENT)
Dept: FAMILY MEDICINE CLINIC | Age: 65
End: 2025-01-14
Payer: MEDICAID

## 2025-01-14 VITALS
DIASTOLIC BLOOD PRESSURE: 72 MMHG | WEIGHT: 229 LBS | SYSTOLIC BLOOD PRESSURE: 100 MMHG | HEIGHT: 62 IN | OXYGEN SATURATION: 97 % | HEART RATE: 71 BPM | BODY MASS INDEX: 42.14 KG/M2

## 2025-01-14 DIAGNOSIS — E66.813 CLASS 3 SEVERE OBESITY DUE TO EXCESS CALORIES WITH SERIOUS COMORBIDITY AND BODY MASS INDEX (BMI) OF 45.0 TO 49.9 IN ADULT: ICD-10-CM

## 2025-01-14 DIAGNOSIS — M54.50 CHRONIC BILATERAL LOW BACK PAIN WITHOUT SCIATICA: ICD-10-CM

## 2025-01-14 DIAGNOSIS — E55.9 VITAMIN D DEFICIENCY: ICD-10-CM

## 2025-01-14 DIAGNOSIS — E78.2 MIXED HYPERLIPIDEMIA: ICD-10-CM

## 2025-01-14 DIAGNOSIS — E11.65 TYPE 2 DIABETES MELLITUS WITH HYPERGLYCEMIA, WITHOUT LONG-TERM CURRENT USE OF INSULIN (HCC): Primary | ICD-10-CM

## 2025-01-14 DIAGNOSIS — E66.01 CLASS 3 SEVERE OBESITY DUE TO EXCESS CALORIES WITH SERIOUS COMORBIDITY AND BODY MASS INDEX (BMI) OF 45.0 TO 49.9 IN ADULT: ICD-10-CM

## 2025-01-14 DIAGNOSIS — G89.29 CHRONIC BILATERAL LOW BACK PAIN WITHOUT SCIATICA: ICD-10-CM

## 2025-01-14 DIAGNOSIS — I10 ESSENTIAL HYPERTENSION: ICD-10-CM

## 2025-01-14 LAB
25(OH)D3 SERPL-MCNC: 41.6 NG/ML
ALBUMIN SERPL-MCNC: 4.5 G/DL (ref 3.4–5)
ALBUMIN/GLOB SERPL: 1.7 {RATIO} (ref 1.1–2.2)
ALP SERPL-CCNC: 172 U/L (ref 40–129)
ALT SERPL-CCNC: 52 U/L (ref 10–40)
ANION GAP SERPL CALCULATED.3IONS-SCNC: 12 MMOL/L (ref 3–16)
AST SERPL-CCNC: 66 U/L (ref 15–37)
BASOPHILS # BLD: 0.1 K/UL (ref 0–0.2)
BASOPHILS NFR BLD: 0.9 %
BILIRUB SERPL-MCNC: 1 MG/DL (ref 0–1)
BUN SERPL-MCNC: 20 MG/DL (ref 7–20)
CALCIUM SERPL-MCNC: 9.9 MG/DL (ref 8.3–10.6)
CHLORIDE SERPL-SCNC: 105 MMOL/L (ref 99–110)
CHOLEST SERPL-MCNC: 168 MG/DL (ref 0–199)
CO2 SERPL-SCNC: 26 MMOL/L (ref 21–32)
CREAT SERPL-MCNC: 0.9 MG/DL (ref 0.6–1.2)
DEPRECATED RDW RBC AUTO: 15.5 % (ref 12.4–15.4)
EOSINOPHIL # BLD: 0.2 K/UL (ref 0–0.6)
EOSINOPHIL NFR BLD: 2.2 %
EST. AVERAGE GLUCOSE BLD GHB EST-MCNC: 134.1 MG/DL
FOLATE SERPL-MCNC: 9.66 NG/ML (ref 4.78–24.2)
GFR SERPLBLD CREATININE-BSD FMLA CKD-EPI: 71 ML/MIN/{1.73_M2}
GLUCOSE SERPL-MCNC: 106 MG/DL (ref 70–99)
HBA1C MFR BLD: 6.3 %
HCT VFR BLD AUTO: 47 % (ref 36–48)
HDLC SERPL-MCNC: 66 MG/DL (ref 40–60)
HGB BLD-MCNC: 15.3 G/DL (ref 12–16)
LDLC SERPL CALC-MCNC: 77 MG/DL
LYMPHOCYTES # BLD: 2.9 K/UL (ref 1–5.1)
LYMPHOCYTES NFR BLD: 31.4 %
MAGNESIUM SERPL-MCNC: 2.07 MG/DL (ref 1.8–2.4)
MCH RBC QN AUTO: 28.6 PG (ref 26–34)
MCHC RBC AUTO-ENTMCNC: 32.6 G/DL (ref 31–36)
MCV RBC AUTO: 87.6 FL (ref 80–100)
MONOCYTES # BLD: 0.8 K/UL (ref 0–1.3)
MONOCYTES NFR BLD: 8.6 %
NEUTROPHILS # BLD: 5.3 K/UL (ref 1.7–7.7)
NEUTROPHILS NFR BLD: 56.9 %
PLATELET # BLD AUTO: 391 K/UL (ref 135–450)
PMV BLD AUTO: 8.6 FL (ref 5–10.5)
POTASSIUM SERPL-SCNC: 4.5 MMOL/L (ref 3.5–5.1)
PROT SERPL-MCNC: 7.2 G/DL (ref 6.4–8.2)
RBC # BLD AUTO: 5.36 M/UL (ref 4–5.2)
SODIUM SERPL-SCNC: 143 MMOL/L (ref 136–145)
TRIGL SERPL-MCNC: 126 MG/DL (ref 0–150)
TSH SERPL DL<=0.005 MIU/L-ACNC: 1.04 UIU/ML (ref 0.27–4.2)
VIT B12 SERPL-MCNC: 567 PG/ML (ref 211–911)
VLDLC SERPL CALC-MCNC: 25 MG/DL
WBC # BLD AUTO: 9.3 K/UL (ref 4–11)

## 2025-01-14 PROCEDURE — 3017F COLORECTAL CA SCREEN DOC REV: CPT | Performed by: NURSE PRACTITIONER

## 2025-01-14 PROCEDURE — 3046F HEMOGLOBIN A1C LEVEL >9.0%: CPT | Performed by: NURSE PRACTITIONER

## 2025-01-14 PROCEDURE — 2022F DILAT RTA XM EVC RTNOPTHY: CPT | Performed by: NURSE PRACTITIONER

## 2025-01-14 PROCEDURE — G8399 PT W/DXA RESULTS DOCUMENT: HCPCS | Performed by: NURSE PRACTITIONER

## 2025-01-14 PROCEDURE — 3078F DIAST BP <80 MM HG: CPT | Performed by: NURSE PRACTITIONER

## 2025-01-14 PROCEDURE — 36415 COLL VENOUS BLD VENIPUNCTURE: CPT | Performed by: NURSE PRACTITIONER

## 2025-01-14 PROCEDURE — 3074F SYST BP LT 130 MM HG: CPT | Performed by: NURSE PRACTITIONER

## 2025-01-14 PROCEDURE — G8417 CALC BMI ABV UP PARAM F/U: HCPCS | Performed by: NURSE PRACTITIONER

## 2025-01-14 PROCEDURE — 1123F ACP DISCUSS/DSCN MKR DOCD: CPT | Performed by: NURSE PRACTITIONER

## 2025-01-14 PROCEDURE — 99214 OFFICE O/P EST MOD 30 MIN: CPT | Performed by: NURSE PRACTITIONER

## 2025-01-14 PROCEDURE — 1036F TOBACCO NON-USER: CPT | Performed by: NURSE PRACTITIONER

## 2025-01-14 PROCEDURE — 1090F PRES/ABSN URINE INCON ASSESS: CPT | Performed by: NURSE PRACTITIONER

## 2025-01-14 PROCEDURE — G8427 DOCREV CUR MEDS BY ELIG CLIN: HCPCS | Performed by: NURSE PRACTITIONER

## 2025-01-14 SDOH — ECONOMIC STABILITY: FOOD INSECURITY: WITHIN THE PAST 12 MONTHS, YOU WORRIED THAT YOUR FOOD WOULD RUN OUT BEFORE YOU GOT MONEY TO BUY MORE.: NEVER TRUE

## 2025-01-14 SDOH — ECONOMIC STABILITY: FOOD INSECURITY: WITHIN THE PAST 12 MONTHS, THE FOOD YOU BOUGHT JUST DIDN'T LAST AND YOU DIDN'T HAVE MONEY TO GET MORE.: NEVER TRUE

## 2025-01-14 ASSESSMENT — ENCOUNTER SYMPTOMS
EYE DISCHARGE: 0
FACIAL SWELLING: 0
ALLERGIC/IMMUNOLOGIC NEGATIVE: 1
EYE ITCHING: 0
ABDOMINAL PAIN: 0
RESPIRATORY NEGATIVE: 1
APNEA: 0
RHINORRHEA: 0
EYE REDNESS: 0
RECTAL PAIN: 0
NAUSEA: 0
VOMITING: 0
SINUS PAIN: 0
SHORTNESS OF BREATH: 0
PHOTOPHOBIA: 0
EYE PAIN: 0
SINUS PRESSURE: 0
TROUBLE SWALLOWING: 0
CONSTIPATION: 1
BACK PAIN: 0
VOICE CHANGE: 0
DIARRHEA: 0
WHEEZING: 0
STRIDOR: 0
ANAL BLEEDING: 0
CHOKING: 0
SORE THROAT: 0
CHEST TIGHTNESS: 0
COLOR CHANGE: 0
COUGH: 0
BLOOD IN STOOL: 0
ABDOMINAL DISTENTION: 0

## 2025-01-14 ASSESSMENT — PATIENT HEALTH QUESTIONNAIRE - PHQ9
SUM OF ALL RESPONSES TO PHQ9 QUESTIONS 1 & 2: 0
SUM OF ALL RESPONSES TO PHQ QUESTIONS 1-9: 0
SUM OF ALL RESPONSES TO PHQ QUESTIONS 1-9: 0
1. LITTLE INTEREST OR PLEASURE IN DOING THINGS: NOT AT ALL
2. FEELING DOWN, DEPRESSED OR HOPELESS: NOT AT ALL
SUM OF ALL RESPONSES TO PHQ QUESTIONS 1-9: 0
SUM OF ALL RESPONSES TO PHQ QUESTIONS 1-9: 0

## 2025-01-14 NOTE — PATIENT INSTRUCTIONS
Not feeling your best?  Where to go for the right care at the right time.    Dear Morenita Herrera   I wanted to provide you with some information that might help you seek care for your condition when your primary care provider or specialist is unavailable. If you have a need outside of normal business hours, you should first contact your primary care office or specialist caring for your condition. They may have on-call providers that could assist with your care. During office hours, you may request a virtual or same day appointment.   But what if your primary care provider is not in the office that day and you can't wait until the  next day for care? In that situation, your next option is to visit an urgent care facility.          [facility] now has urgent care sites open to support our community.   [facility] is a great alternative when you need immediate medical care that is not a serious threat to your health or your doctor's office is closed or unable to get you in for an appointment. The urgent care centers offer fast access to [facility] doctors for minor illnesses and injuries for patients of all ages. There are other medical services available including lab testing, X-rays, EKGs, and IV fluids.  Locations are open daily from 8 a.m. - 8 p.m.     [facility]  [address], [place], [place] [address]  [phone]    [address], [place], [place] [address]  [phone]    Local Urgent Care     [facility] 8:30 am - 7:70 pm   [address] [place] [address]  [phone]    [facility]     8 am - 8 pm   [address] [place] [address]  [phone]    [facility] [place] 7 am - 7:30 pm  [address] [place] [address]  [phone]     [facility] [place] 7 am - 7:30 pm  [address] [place] [address]  [phone]    [place]

## 2025-01-14 NOTE — PROGRESS NOTES
TABS tablet Take 1 tablet by mouth daily Yes ProviderFabricio MD   Continuous Glucose Sensor (FREESTYLE AFSHIN 2 SENSOR) MISC USE AS DIRECTED AND REPLACE EVERY 14 DAYS Yes Maggy Gamez APRN - CNP   blood glucose test strips (ONETOUCH ULTRA) strip 1 each by In Vitro route daily Testing once a day. Yes Maggy Gamez APRN - CNP   Blood Glucose Monitoring Suppl (ONE TOUCH ULTRA 2) w/Device KIT 1 kit by Does not apply route daily Testing once a day Yes Maggy Gamez APRN - CNP   FreeStyle Lancets MISC 1 each by Does not apply route daily Yes Maggy Gamez APRN - CNP   aspirin 81 MG tablet Take 1 tablet by mouth daily Yes ProviderFabricio MD     No orders of the defined types were placed in this encounter.      No follow-ups on file.    Patient should call the office immediately with new or ongoing signs or symptoms or worsening, or proceedto the emergency room.  No changes in past medical history, past surgical history, social history, or family history were noted during the patient encounter unless specifically listed above.  All updates of past medicalhistory, past surgical history, social history, or family history were reviewed personally by me during the office visit.  All problems listed in the assessment are stable unless noted otherwise.  Medication profilereviewed personally by me during the office visit.  Medication side effects and possible impairments from medications were discussed as applicable.     Call if pattern of symptoms change or persists for an extended time.    This document was prepared by a combination of typing and transcription through a voice recognition software.    All medications have the potential for adverse effects. All medications effect each person differently. Please read and review provided information related to medication. If the medication that you have been prescribed has the potential to cause sedation, do not drive or operate car,

## 2025-01-15 ENCOUNTER — TELEPHONE (OUTPATIENT)
Dept: FAMILY MEDICINE CLINIC | Age: 65
End: 2025-01-15

## 2025-01-15 NOTE — TELEPHONE ENCOUNTER
W spoke with pt to follow-up on PCF referral regarding pt's medication and PAP.  Pt stated she received a call and did receive the Ozempic through [employer] yesterday.  Pt stated she no longer needs SW assistance on this referral and issue since she did receive the medication.  PLAN: Sw will inform PCP and close out referral since pt did receive medication through PAP.

## 2025-04-23 ENCOUNTER — TELEPHONE (OUTPATIENT)
Dept: FAMILY MEDICINE CLINIC | Age: 65
End: 2025-04-23

## 2025-05-13 ENCOUNTER — OFFICE VISIT (OUTPATIENT)
Dept: FAMILY MEDICINE CLINIC | Age: 65
End: 2025-05-13
Payer: MEDICARE

## 2025-05-13 VITALS
RESPIRATION RATE: 20 BRPM | HEART RATE: 89 BPM | HEIGHT: 62 IN | WEIGHT: 239 LBS | SYSTOLIC BLOOD PRESSURE: 110 MMHG | OXYGEN SATURATION: 98 % | BODY MASS INDEX: 43.98 KG/M2 | DIASTOLIC BLOOD PRESSURE: 72 MMHG

## 2025-05-13 DIAGNOSIS — R74.8 ABNORMAL LIVER ENZYMES: ICD-10-CM

## 2025-05-13 DIAGNOSIS — E78.2 MIXED HYPERLIPIDEMIA: ICD-10-CM

## 2025-05-13 DIAGNOSIS — E11.65 TYPE 2 DIABETES MELLITUS WITH HYPERGLYCEMIA, WITHOUT LONG-TERM CURRENT USE OF INSULIN (HCC): Primary | ICD-10-CM

## 2025-05-13 DIAGNOSIS — E55.9 VITAMIN D DEFICIENCY: ICD-10-CM

## 2025-05-13 DIAGNOSIS — I10 ESSENTIAL HYPERTENSION: ICD-10-CM

## 2025-05-13 LAB
25(OH)D3 SERPL-MCNC: 51.4 NG/ML
ALBUMIN SERPL-MCNC: 4 G/DL (ref 3.4–5)
ALBUMIN/GLOB SERPL: 1.8 {RATIO} (ref 1.1–2.2)
ALP SERPL-CCNC: 207 U/L (ref 40–129)
ALT SERPL-CCNC: 46 U/L (ref 10–40)
ANION GAP SERPL CALCULATED.3IONS-SCNC: 11 MMOL/L (ref 3–16)
AST SERPL-CCNC: 77 U/L (ref 15–37)
BILIRUB SERPL-MCNC: 0.6 MG/DL (ref 0–1)
BUN SERPL-MCNC: 27 MG/DL (ref 7–20)
CALCIUM SERPL-MCNC: 9.8 MG/DL (ref 8.3–10.6)
CHLORIDE SERPL-SCNC: 107 MMOL/L (ref 99–110)
CHOLEST SERPL-MCNC: 146 MG/DL (ref 0–199)
CO2 SERPL-SCNC: 25 MMOL/L (ref 21–32)
CREAT SERPL-MCNC: 0.8 MG/DL (ref 0.6–1.2)
CREAT UR-MCNC: 64.5 MG/DL (ref 28–259)
DEPRECATED RDW RBC AUTO: 15.1 % (ref 12.4–15.4)
EST. AVERAGE GLUCOSE BLD GHB EST-MCNC: 134.1 MG/DL
GFR SERPLBLD CREATININE-BSD FMLA CKD-EPI: 81 ML/MIN/{1.73_M2}
GLUCOSE SERPL-MCNC: 205 MG/DL (ref 70–99)
HAV IGM SERPL QL IA: NORMAL
HBA1C MFR BLD: 6.3 %
HBV CORE IGM SERPL QL IA: NORMAL
HBV SURFACE AG SERPL QL IA: NORMAL
HCT VFR BLD AUTO: 43.7 % (ref 36–48)
HCV AB SERPL QL IA: NORMAL
HDLC SERPL-MCNC: 59 MG/DL (ref 40–60)
HGB BLD-MCNC: 14.4 G/DL (ref 12–16)
LDLC SERPL CALC-MCNC: 40 MG/DL
MCH RBC QN AUTO: 28.4 PG (ref 26–34)
MCHC RBC AUTO-ENTMCNC: 33 G/DL (ref 31–36)
MCV RBC AUTO: 85.9 FL (ref 80–100)
MICROALBUMIN UR DL<=1MG/L-MCNC: <1.2 MG/DL
MICROALBUMIN/CREAT UR: NORMAL MG/G (ref 0–30)
PLATELET # BLD AUTO: 384 K/UL (ref 135–450)
PMV BLD AUTO: 8.8 FL (ref 5–10.5)
POTASSIUM SERPL-SCNC: 4.9 MMOL/L (ref 3.5–5.1)
PROT SERPL-MCNC: 6.2 G/DL (ref 6.4–8.2)
RBC # BLD AUTO: 5.08 M/UL (ref 4–5.2)
SODIUM SERPL-SCNC: 143 MMOL/L (ref 136–145)
TRIGL SERPL-MCNC: 233 MG/DL (ref 0–150)
TSH SERPL DL<=0.005 MIU/L-ACNC: 1.24 UIU/ML (ref 0.27–4.2)
VLDLC SERPL CALC-MCNC: 47 MG/DL
WBC # BLD AUTO: 8.8 K/UL (ref 4–11)

## 2025-05-13 PROCEDURE — 3074F SYST BP LT 130 MM HG: CPT | Performed by: FAMILY MEDICINE

## 2025-05-13 PROCEDURE — 36415 COLL VENOUS BLD VENIPUNCTURE: CPT | Performed by: FAMILY MEDICINE

## 2025-05-13 PROCEDURE — 99214 OFFICE O/P EST MOD 30 MIN: CPT | Performed by: FAMILY MEDICINE

## 2025-05-13 PROCEDURE — 1123F ACP DISCUSS/DSCN MKR DOCD: CPT | Performed by: FAMILY MEDICINE

## 2025-05-13 PROCEDURE — 3044F HG A1C LEVEL LT 7.0%: CPT | Performed by: FAMILY MEDICINE

## 2025-05-13 PROCEDURE — 3078F DIAST BP <80 MM HG: CPT | Performed by: FAMILY MEDICINE

## 2025-05-13 ASSESSMENT — ENCOUNTER SYMPTOMS
CONSTIPATION: 0
SHORTNESS OF BREATH: 0
VOMITING: 0
NAUSEA: 0
DIARRHEA: 0

## 2025-05-13 NOTE — PROGRESS NOTES
Morenita Herrera (:  1960) is a 64 y.o. female,Established patient, here for evaluation of the following chief complaint(s):  Establish Care           Assessment & Plan  Type 2 diabetes mellitus with hyperglycemia, without long-term current use of insulin (HCC)    Due for lab work ordered today.  Continue current medication.  Patient currently on max dose of Ozempic.  Consider addition of metformin going forward.    Orders:    Hemoglobin A1C    Lipid Panel    Comprehensive Metabolic Panel    Albumin/Creatinine Ratio, Urine    CBC    TSH reflex to T4F    Essential hypertension    Controlled continue losartan         Mixed hyperlipidemia    Continue atorvastatin.  Labs today         Abnormal liver enzymes    Follow-up lab work ordered rule out hepatitis repeat LFTs.    Orders:    Hepatitis Panel, Acute    Vitamin D deficiency    Continue vitamin D labs ordered    Orders:    Vitamin D 25 Hydroxy      Return in about 3 months (around 2025) for dm2.       Subjective   HPI  64-year-old female changing office locations.  Requests to establish.  Reviewed medical surgical social family history.  Reviewed most recent labs in January elevation of liver testing needing follow-up.  Discussed diabetic eye exam.    Review of Systems   Respiratory:  Negative for shortness of breath.    Cardiovascular:  Negative for chest pain.   Gastrointestinal:  Negative for constipation, diarrhea, nausea and vomiting.   Genitourinary: Negative.    Skin:  Negative for rash.   Neurological:  Negative for headaches.          Objective   Vitals:    25 0736   BP: 110/72   Pulse: 89   Resp: 20   SpO2: 98%     Physical Exam  HENT:      Head: Normocephalic and atraumatic.   Eyes:      Extraocular Movements: Extraocular movements intact.   Cardiovascular:      Rate and Rhythm: Normal rate and regular rhythm.   Pulmonary:      Effort: Pulmonary effort is normal.      Breath sounds: No wheezing, rhonchi or rales.   Musculoskeletal:

## 2025-05-13 NOTE — ASSESSMENT & PLAN NOTE
Follow-up lab work ordered rule out hepatitis repeat LFTs.    Orders:    Hepatitis Panel, Acute

## 2025-05-13 NOTE — ASSESSMENT & PLAN NOTE
Due for lab work ordered today.  Continue current medication.  Patient currently on max dose of Ozempic.  Consider addition of metformin going forward.    Orders:    Hemoglobin A1C    Lipid Panel    Comprehensive Metabolic Panel    Albumin/Creatinine Ratio, Urine    CBC    TSH reflex to T4F

## 2025-05-14 ENCOUNTER — RESULTS FOLLOW-UP (OUTPATIENT)
Dept: FAMILY MEDICINE CLINIC | Age: 65
End: 2025-05-14

## 2025-05-14 DIAGNOSIS — K76.0 FATTY LIVER DISEASE, NONALCOHOLIC: ICD-10-CM

## 2025-05-14 DIAGNOSIS — K76.0 FATTY LIVER DISEASE, NONALCOHOLIC: Primary | ICD-10-CM

## 2025-05-14 LAB — GGT SERPL-CCNC: 132 U/L (ref 5–36)

## 2025-06-03 DIAGNOSIS — R80.1 PERSISTENT PROTEINURIA: ICD-10-CM

## 2025-06-03 DIAGNOSIS — E11.65 TYPE 2 DIABETES MELLITUS WITH HYPERGLYCEMIA, WITHOUT LONG-TERM CURRENT USE OF INSULIN (HCC): ICD-10-CM

## 2025-06-09 DIAGNOSIS — R80.1 PERSISTENT PROTEINURIA: ICD-10-CM

## 2025-06-09 DIAGNOSIS — E11.65 TYPE 2 DIABETES MELLITUS WITH HYPERGLYCEMIA, WITHOUT LONG-TERM CURRENT USE OF INSULIN (HCC): ICD-10-CM

## 2025-06-09 NOTE — TELEPHONE ENCOUNTER
Medication is not covered at [facility] so pt is requesting the refill be sent back to [facility], I called [facility] to see if they could go ahead and fill but they said they need a new script since the old one was canceled.

## 2025-08-04 ENCOUNTER — TELEPHONE (OUTPATIENT)
Dept: FAMILY MEDICINE CLINIC | Age: 65
End: 2025-08-04

## 2025-08-13 ENCOUNTER — OFFICE VISIT (OUTPATIENT)
Dept: FAMILY MEDICINE CLINIC | Age: 65
End: 2025-08-13
Payer: MEDICARE

## 2025-08-13 VITALS
OXYGEN SATURATION: 98 % | HEART RATE: 82 BPM | BODY MASS INDEX: 44.16 KG/M2 | DIASTOLIC BLOOD PRESSURE: 70 MMHG | SYSTOLIC BLOOD PRESSURE: 112 MMHG | WEIGHT: 240 LBS | RESPIRATION RATE: 20 BRPM | HEIGHT: 62 IN

## 2025-08-13 DIAGNOSIS — R74.8 ABNORMAL LIVER ENZYMES: ICD-10-CM

## 2025-08-13 DIAGNOSIS — E66.813 CLASS 3 SEVERE OBESITY DUE TO EXCESS CALORIES WITH SERIOUS COMORBIDITY AND BODY MASS INDEX (BMI) OF 45.0 TO 49.9 IN ADULT (HCC): ICD-10-CM

## 2025-08-13 DIAGNOSIS — Z00.00 WELCOME TO MEDICARE PREVENTIVE VISIT: Primary | ICD-10-CM

## 2025-08-13 DIAGNOSIS — E11.65 TYPE 2 DIABETES MELLITUS WITH HYPERGLYCEMIA, WITHOUT LONG-TERM CURRENT USE OF INSULIN (HCC): ICD-10-CM

## 2025-08-13 DIAGNOSIS — E78.2 MIXED HYPERLIPIDEMIA: ICD-10-CM

## 2025-08-13 DIAGNOSIS — I10 ESSENTIAL HYPERTENSION: ICD-10-CM

## 2025-08-13 PROCEDURE — 1123F ACP DISCUSS/DSCN MKR DOCD: CPT | Performed by: FAMILY MEDICINE

## 2025-08-13 PROCEDURE — 99214 OFFICE O/P EST MOD 30 MIN: CPT | Performed by: FAMILY MEDICINE

## 2025-08-13 PROCEDURE — 3074F SYST BP LT 130 MM HG: CPT | Performed by: FAMILY MEDICINE

## 2025-08-13 PROCEDURE — G0438 PPPS, INITIAL VISIT: HCPCS | Performed by: FAMILY MEDICINE

## 2025-08-13 PROCEDURE — 3078F DIAST BP <80 MM HG: CPT | Performed by: FAMILY MEDICINE

## 2025-08-13 PROCEDURE — 3044F HG A1C LEVEL LT 7.0%: CPT | Performed by: FAMILY MEDICINE

## 2025-08-13 RX ORDER — KETOROLAC TROMETHAMINE 30 MG/ML
1 INJECTION, SOLUTION INTRAMUSCULAR; INTRAVENOUS CONTINUOUS
Qty: 1 EACH | Refills: 0 | Status: SHIPPED | OUTPATIENT
Start: 2025-08-13

## 2025-08-13 RX ORDER — HYDROCHLOROTHIAZIDE 12.5 MG/1
CAPSULE ORAL
Qty: 6 EACH | Refills: 3 | Status: SHIPPED | OUTPATIENT
Start: 2025-08-13 | End: 2026-08-08

## 2025-08-13 RX ORDER — ACYCLOVIR 800 MG/1
1 TABLET ORAL
Qty: 6 EACH | Refills: 3 | Status: SHIPPED | OUTPATIENT
Start: 2025-08-13 | End: 2025-08-13

## 2025-08-13 ASSESSMENT — PATIENT HEALTH QUESTIONNAIRE - PHQ9
2. FEELING DOWN, DEPRESSED OR HOPELESS: NOT AT ALL
SUM OF ALL RESPONSES TO PHQ QUESTIONS 1-9: 0
1. LITTLE INTEREST OR PLEASURE IN DOING THINGS: NOT AT ALL
SUM OF ALL RESPONSES TO PHQ QUESTIONS 1-9: 0

## 2025-08-13 ASSESSMENT — LIFESTYLE VARIABLES
HOW OFTEN DO YOU HAVE A DRINK CONTAINING ALCOHOL: MONTHLY OR LESS
HOW MANY STANDARD DRINKS CONTAINING ALCOHOL DO YOU HAVE ON A TYPICAL DAY: 3 OR 4

## 2025-08-13 ASSESSMENT — ENCOUNTER SYMPTOMS
DIARRHEA: 0
NAUSEA: 0
VOMITING: 0
CONSTIPATION: 0
SHORTNESS OF BREATH: 0

## 2025-08-14 ENCOUNTER — TELEPHONE (OUTPATIENT)
Dept: ADMINISTRATIVE | Age: 65
End: 2025-08-14